# Patient Record
Sex: FEMALE | Race: WHITE | Employment: OTHER | URBAN - METROPOLITAN AREA
[De-identification: names, ages, dates, MRNs, and addresses within clinical notes are randomized per-mention and may not be internally consistent; named-entity substitution may affect disease eponyms.]

---

## 2017-01-03 ENCOUNTER — OFFICE VISIT (OUTPATIENT)
Dept: SURGERY | Age: 62
End: 2017-01-03

## 2017-01-03 DIAGNOSIS — E53.8 VITAMIN B12 DEFICIENCY: Primary | ICD-10-CM

## 2017-01-03 PROCEDURE — 96372 THER/PROPH/DIAG INJ SC/IM: CPT | Performed by: INTERNAL MEDICINE

## 2017-01-03 RX ORDER — CYANOCOBALAMIN 1000 UG/ML
1000 INJECTION INTRAMUSCULAR; SUBCUTANEOUS ONCE
Status: COMPLETED | OUTPATIENT
Start: 2017-01-03 | End: 2017-01-03

## 2017-01-03 RX ADMIN — CYANOCOBALAMIN 1000 MCG: 1000 INJECTION INTRAMUSCULAR; SUBCUTANEOUS at 16:02

## 2017-01-06 RX ORDER — LANCETS 30 GAUGE
EACH MISCELLANEOUS
Qty: 100 EACH | Refills: 3 | Status: SHIPPED | OUTPATIENT
Start: 2017-01-06 | End: 2018-12-06 | Stop reason: SDUPTHER

## 2017-01-06 RX ORDER — GLUCOSAMINE HCL/CHONDROITIN SU 500-400 MG
CAPSULE ORAL
Qty: 50 STRIP | Refills: 6 | Status: SHIPPED | OUTPATIENT
Start: 2017-01-06 | End: 2018-01-04 | Stop reason: SDUPTHER

## 2017-02-01 ENCOUNTER — OFFICE VISIT (OUTPATIENT)
Dept: SURGERY | Age: 62
End: 2017-02-01

## 2017-02-01 VITALS
WEIGHT: 205 LBS | DIASTOLIC BLOOD PRESSURE: 70 MMHG | BODY MASS INDEX: 36.32 KG/M2 | HEIGHT: 63 IN | SYSTOLIC BLOOD PRESSURE: 130 MMHG | HEART RATE: 80 BPM

## 2017-02-01 DIAGNOSIS — E78.00 HYPERCHOLESTEREMIA: ICD-10-CM

## 2017-02-01 DIAGNOSIS — E53.8 VITAMIN B12 DEFICIENCY: ICD-10-CM

## 2017-02-01 DIAGNOSIS — E03.9 ACQUIRED HYPOTHYROIDISM: ICD-10-CM

## 2017-02-01 LAB — GLUCOSE BLD-MCNC: 113 MG/DL

## 2017-02-01 PROCEDURE — 3017F COLORECTAL CA SCREEN DOC REV: CPT | Performed by: INTERNAL MEDICINE

## 2017-02-01 PROCEDURE — G8427 DOCREV CUR MEDS BY ELIG CLIN: HCPCS | Performed by: INTERNAL MEDICINE

## 2017-02-01 PROCEDURE — 3044F HG A1C LEVEL LT 7.0%: CPT | Performed by: INTERNAL MEDICINE

## 2017-02-01 PROCEDURE — G8419 CALC BMI OUT NRM PARAM NOF/U: HCPCS | Performed by: INTERNAL MEDICINE

## 2017-02-01 PROCEDURE — 99213 OFFICE O/P EST LOW 20 MIN: CPT | Performed by: INTERNAL MEDICINE

## 2017-02-01 PROCEDURE — 96372 THER/PROPH/DIAG INJ SC/IM: CPT | Performed by: INTERNAL MEDICINE

## 2017-02-01 PROCEDURE — 82962 GLUCOSE BLOOD TEST: CPT | Performed by: INTERNAL MEDICINE

## 2017-02-01 PROCEDURE — 4004F PT TOBACCO SCREEN RCVD TLK: CPT | Performed by: INTERNAL MEDICINE

## 2017-02-01 PROCEDURE — 3014F SCREEN MAMMO DOC REV: CPT | Performed by: INTERNAL MEDICINE

## 2017-02-01 PROCEDURE — G8484 FLU IMMUNIZE NO ADMIN: HCPCS | Performed by: INTERNAL MEDICINE

## 2017-02-01 RX ORDER — CYANOCOBALAMIN 1000 UG/ML
1000 INJECTION INTRAMUSCULAR; SUBCUTANEOUS ONCE
Status: COMPLETED | OUTPATIENT
Start: 2017-02-01 | End: 2017-02-01

## 2017-02-01 RX ORDER — AMLODIPINE BESYLATE 10 MG/1
TABLET ORAL
Qty: 90 TABLET | Refills: 3 | Status: SHIPPED | OUTPATIENT
Start: 2017-02-01 | End: 2017-11-03 | Stop reason: SDUPTHER

## 2017-02-01 RX ORDER — LEVOTHYROXINE SODIUM 0.1 MG/1
100 TABLET ORAL DAILY
Qty: 90 TABLET | Refills: 3 | Status: SHIPPED | OUTPATIENT
Start: 2017-02-01 | End: 2017-11-03 | Stop reason: SDUPTHER

## 2017-02-01 RX ORDER — CYANOCOBALAMIN 1000 UG/ML
1000 INJECTION INTRAMUSCULAR; SUBCUTANEOUS ONCE
Qty: 10 ML | Refills: 5 | Status: SHIPPED | OUTPATIENT
Start: 2017-02-01 | End: 2017-11-03 | Stop reason: SDUPTHER

## 2017-02-01 RX ORDER — ATORVASTATIN CALCIUM 10 MG/1
10 TABLET, FILM COATED ORAL DAILY
Qty: 90 TABLET | Refills: 3 | Status: SHIPPED | OUTPATIENT
Start: 2017-02-01 | End: 2017-11-03 | Stop reason: SDUPTHER

## 2017-02-01 RX ORDER — SYRINGE W-NEEDLE,DISPOSAB,3 ML 25GX5/8"
SYRINGE, EMPTY DISPOSABLE MISCELLANEOUS
Qty: 20 EACH | Refills: 0 | Status: SHIPPED | OUTPATIENT
Start: 2017-02-01 | End: 2017-03-30 | Stop reason: SDUPTHER

## 2017-02-01 RX ADMIN — CYANOCOBALAMIN 1000 MCG: 1000 INJECTION INTRAMUSCULAR; SUBCUTANEOUS at 12:07

## 2017-02-05 ASSESSMENT — ENCOUNTER SYMPTOMS: EYES NEGATIVE: 1

## 2017-02-07 PROBLEM — M50.223 HERNIATED NUCLEUS PULPOSUS, C6-7 LEFT: Status: ACTIVE | Noted: 2017-02-07

## 2017-02-07 PROBLEM — M75.02 ADHESIVE CAPSULITIS OF BOTH SHOULDERS: Status: ACTIVE | Noted: 2017-02-07

## 2017-02-07 PROBLEM — F17.200 SMOKER: Status: ACTIVE | Noted: 2017-02-07

## 2017-02-07 PROBLEM — M50.222 HERNIATED NUCLEUS PULPOSUS, C5-6: Status: ACTIVE | Noted: 2017-02-07

## 2017-02-07 PROBLEM — M75.01 ADHESIVE CAPSULITIS OF BOTH SHOULDERS: Status: ACTIVE | Noted: 2017-02-07

## 2017-03-30 RX ORDER — SYRINGE W-NEEDLE,DISPOSAB,3 ML 25GX5/8"
SYRINGE, EMPTY DISPOSABLE MISCELLANEOUS
Qty: 3 EACH | Refills: 3 | Status: SHIPPED | OUTPATIENT
Start: 2017-03-30 | End: 2017-03-30 | Stop reason: SDUPTHER

## 2017-03-30 RX ORDER — SYRINGE W-NEEDLE,DISPOSAB,3 ML 25GX5/8"
SYRINGE, EMPTY DISPOSABLE MISCELLANEOUS
Qty: 10 EACH | Refills: 3 | Status: SHIPPED | OUTPATIENT
Start: 2017-03-30

## 2017-04-25 LAB
CHOLESTEROL, TOTAL: 159 MG/DL
CHOLESTEROL/HDL RATIO: ABNORMAL
HDLC SERPL-MCNC: 32 MG/DL (ref 35–70)
LDL CHOLESTEROL CALCULATED: 84 MG/DL (ref 0–160)
TRIGL SERPL-MCNC: 214 MG/DL
VLDLC SERPL CALC-MCNC: 43 MG/DL

## 2017-05-03 ENCOUNTER — OFFICE VISIT (OUTPATIENT)
Dept: SURGERY | Age: 62
End: 2017-05-03

## 2017-05-03 VITALS
HEIGHT: 62 IN | SYSTOLIC BLOOD PRESSURE: 132 MMHG | HEART RATE: 80 BPM | WEIGHT: 205 LBS | BODY MASS INDEX: 37.73 KG/M2 | DIASTOLIC BLOOD PRESSURE: 74 MMHG

## 2017-05-03 DIAGNOSIS — J01.90 ACUTE NON-RECURRENT SINUSITIS, UNSPECIFIED LOCATION: ICD-10-CM

## 2017-05-03 DIAGNOSIS — E03.9 HYPOTHYROIDISM, UNSPECIFIED TYPE: ICD-10-CM

## 2017-05-03 LAB
GLUCOSE BLD-MCNC: 113 MG/DL
HBA1C MFR BLD: 6 %

## 2017-05-03 PROCEDURE — 83036 HEMOGLOBIN GLYCOSYLATED A1C: CPT | Performed by: INTERNAL MEDICINE

## 2017-05-03 PROCEDURE — 3014F SCREEN MAMMO DOC REV: CPT | Performed by: INTERNAL MEDICINE

## 2017-05-03 PROCEDURE — G8417 CALC BMI ABV UP PARAM F/U: HCPCS | Performed by: INTERNAL MEDICINE

## 2017-05-03 PROCEDURE — 99213 OFFICE O/P EST LOW 20 MIN: CPT | Performed by: INTERNAL MEDICINE

## 2017-05-03 PROCEDURE — 3044F HG A1C LEVEL LT 7.0%: CPT | Performed by: INTERNAL MEDICINE

## 2017-05-03 PROCEDURE — 4004F PT TOBACCO SCREEN RCVD TLK: CPT | Performed by: INTERNAL MEDICINE

## 2017-05-03 PROCEDURE — G8427 DOCREV CUR MEDS BY ELIG CLIN: HCPCS | Performed by: INTERNAL MEDICINE

## 2017-05-03 PROCEDURE — 3017F COLORECTAL CA SCREEN DOC REV: CPT | Performed by: INTERNAL MEDICINE

## 2017-05-03 PROCEDURE — 82962 GLUCOSE BLOOD TEST: CPT | Performed by: INTERNAL MEDICINE

## 2017-05-03 RX ORDER — AZITHROMYCIN 250 MG/1
TABLET, FILM COATED ORAL
Qty: 1 PACKET | Refills: 0 | Status: SHIPPED | OUTPATIENT
Start: 2017-05-03 | End: 2017-05-13

## 2017-05-03 ASSESSMENT — ENCOUNTER SYMPTOMS: EYES NEGATIVE: 1

## 2017-05-08 ASSESSMENT — ENCOUNTER SYMPTOMS: SINUS PRESSURE: 1

## 2017-05-26 RX ORDER — ATORVASTATIN CALCIUM 10 MG/1
10 TABLET, FILM COATED ORAL DAILY
Qty: 90 TABLET | Refills: 3 | Status: CANCELLED | OUTPATIENT
Start: 2017-05-26

## 2017-05-26 RX ORDER — ATORVASTATIN CALCIUM 10 MG/1
TABLET, FILM COATED ORAL
Qty: 30 TABLET | Refills: 3 | Status: CANCELLED | OUTPATIENT
Start: 2017-05-26

## 2017-05-26 RX ORDER — ATORVASTATIN CALCIUM 10 MG/1
TABLET, FILM COATED ORAL
Qty: 90 TABLET | Refills: 1 | Status: SHIPPED | OUTPATIENT
Start: 2017-05-26 | End: 2017-10-30 | Stop reason: SDUPTHER

## 2017-06-07 ENCOUNTER — HOSPITAL ENCOUNTER (OUTPATIENT)
Dept: WOMENS IMAGING | Age: 62
Discharge: HOME | End: 2017-06-07
Payer: COMMERCIAL

## 2017-06-07 DIAGNOSIS — Z12.31 ENCOUNTER FOR SCREENING MAMMOGRAM FOR BREAST CANCER: ICD-10-CM

## 2017-06-07 PROCEDURE — G0202 SCR MAMMO BI INCL CAD: HCPCS

## 2017-11-01 RX ORDER — ATORVASTATIN CALCIUM 10 MG/1
TABLET, FILM COATED ORAL
Qty: 90 TABLET | Refills: 1 | Status: SHIPPED | OUTPATIENT
Start: 2017-11-01 | End: 2017-11-03 | Stop reason: SDUPTHER

## 2017-11-03 ENCOUNTER — OFFICE VISIT (OUTPATIENT)
Dept: SURGERY | Age: 62
End: 2017-11-03

## 2017-11-03 VITALS
DIASTOLIC BLOOD PRESSURE: 87 MMHG | WEIGHT: 206 LBS | HEART RATE: 81 BPM | BODY MASS INDEX: 37.91 KG/M2 | HEIGHT: 62 IN | SYSTOLIC BLOOD PRESSURE: 139 MMHG

## 2017-11-03 DIAGNOSIS — E78.00 HYPERCHOLESTEROLEMIA: ICD-10-CM

## 2017-11-03 DIAGNOSIS — E03.9 ACQUIRED HYPOTHYROIDISM: ICD-10-CM

## 2017-11-03 LAB — GLUCOSE BLD-MCNC: 119 MG/DL

## 2017-11-03 PROCEDURE — G8417 CALC BMI ABV UP PARAM F/U: HCPCS | Performed by: INTERNAL MEDICINE

## 2017-11-03 PROCEDURE — 4004F PT TOBACCO SCREEN RCVD TLK: CPT | Performed by: INTERNAL MEDICINE

## 2017-11-03 PROCEDURE — G8484 FLU IMMUNIZE NO ADMIN: HCPCS | Performed by: INTERNAL MEDICINE

## 2017-11-03 PROCEDURE — 3044F HG A1C LEVEL LT 7.0%: CPT | Performed by: INTERNAL MEDICINE

## 2017-11-03 PROCEDURE — 3017F COLORECTAL CA SCREEN DOC REV: CPT | Performed by: INTERNAL MEDICINE

## 2017-11-03 PROCEDURE — 3014F SCREEN MAMMO DOC REV: CPT | Performed by: INTERNAL MEDICINE

## 2017-11-03 PROCEDURE — 99213 OFFICE O/P EST LOW 20 MIN: CPT | Performed by: INTERNAL MEDICINE

## 2017-11-03 PROCEDURE — 82962 GLUCOSE BLOOD TEST: CPT | Performed by: INTERNAL MEDICINE

## 2017-11-03 PROCEDURE — G8427 DOCREV CUR MEDS BY ELIG CLIN: HCPCS | Performed by: INTERNAL MEDICINE

## 2017-11-03 RX ORDER — AMLODIPINE BESYLATE 10 MG/1
TABLET ORAL
Qty: 90 TABLET | Refills: 3 | Status: SHIPPED | OUTPATIENT
Start: 2017-11-03 | End: 2018-11-20 | Stop reason: SDUPTHER

## 2017-11-03 RX ORDER — ATORVASTATIN CALCIUM 10 MG/1
TABLET, FILM COATED ORAL
Qty: 90 TABLET | Refills: 1 | Status: SHIPPED | OUTPATIENT
Start: 2017-11-03 | End: 2018-07-27 | Stop reason: SDUPTHER

## 2017-11-03 RX ORDER — CYANOCOBALAMIN 1000 UG/ML
1000 INJECTION INTRAMUSCULAR; SUBCUTANEOUS ONCE
Qty: 10 ML | Refills: 5 | Status: SHIPPED | OUTPATIENT
Start: 2017-11-03 | End: 2021-02-12

## 2017-11-03 RX ORDER — LEVOTHYROXINE SODIUM 0.1 MG/1
100 TABLET ORAL DAILY
Qty: 90 TABLET | Refills: 3 | Status: SHIPPED | OUTPATIENT
Start: 2017-11-03 | End: 2018-11-20 | Stop reason: SDUPTHER

## 2017-11-03 NOTE — PROGRESS NOTES
Subjective:      Patient ID: Lainey Bahena is a 60 y.o. female. Diabetes   She presents for her follow-up diabetic visit. She has type 2 diabetes mellitus. Her disease course has been stable. Hypoglycemia symptoms include nervousness/anxiousness. Symptoms are stable. Risk factors for coronary artery disease include diabetes mellitus, obesity, sedentary lifestyle and post-menopausal. Current diabetic treatment includes oral agent (monotherapy) (metformin). She is compliant with treatment most of the time. She is following a generally healthy diet. She participates in exercise intermittently. Her overall blood glucose range is 110-130 mg/dl. (Lab Results       Component                Value               Date                       LABA1C                   6.0                  10/27/17            ) Eye exam current: 11/29/16. Other   This is a chronic (hypothyroidism) problem. The current episode started more than 1 year ago. The problem occurs intermittently. The problem has been waxing and waning. Treatments tried: synthyroid. The treatment provided moderate relief. Hyperlipidemia   This is a chronic problem. Exacerbating diseases include diabetes, hypothyroidism and obesity. Current antihyperlipidemic treatment includes statins. Risk factors for coronary artery disease include diabetes mellitus, dyslipidemia, obesity, post-menopausal and a sedentary lifestyle.        Pt requesting hepatitis C SCREENING TEST  test       Patient Active Problem List   Diagnosis    Acquired hypothyroidism    Obesity    Vitamin B12 deficiency    Uncontrolled diabetes mellitus type 2 without complications ([facility] [address]    Uncontrolled type 2 diabetes mellitus without complication, without long-term current use of insulin (HCC)    Adhesive capsulitis of both shoulders    Herniated nucleus pulposus, C5-6    Herniated nucleus pulposus, C6-7 left    Smoker       No Known Allergies               Current Outpatient Prescriptions:    heart sounds. Pulmonary/Chest: Effort normal and breath sounds normal.   Abdominal:   Obese    Musculoskeletal: Normal range of motion. She exhibits edema. Neurological: She is alert and oriented to person, place, and time. Skin: Skin is warm and dry. Psychiatric: She has a normal mood and affect. Labs reviewed from 10/27/17  Chem was normal   hbaic was 6.2  Free t4 was 1.36  tsh was 2.520   Assessment:      1. Uncontrolled type 2 diabetes mellitus without complication, without long-term current use of insulin (HCC)  POCT Glucose    Lipid Panel    Hepatic Function Panel    Basic Metabolic Panel    Hemoglobin A1C    Hepatitis B Core Antibody, Total    CANCELED: POCT glycosylated hemoglobin (Hb A1C)   2. Acquired hypothyroidism  T4, Free    TSH without Reflex   3.  Hypercholesterolemia             Plan:      Orders Placed This Encounter   Procedures    Lipid Panel     Standing Status:   Future     Standing Expiration Date:   11/3/2018     Order Specific Question:   Is Patient Fasting?/# of Hours     Answer:   y    Hepatic Function Panel     Standing Status:   Future     Standing Expiration Date:   11/3/2018    Basic Metabolic Panel     Standing Status:   Future     Standing Expiration Date:   11/3/2018    Hemoglobin A1C     Standing Status:   Future     Standing Expiration Date:   11/3/2018    T4, Free     Standing Status:   Future     Standing Expiration Date:   11/3/2018    TSH without Reflex     Standing Status:   Future     Standing Expiration Date:   11/3/2018    Hepatitis B Core Antibody, Total     Standing Status:   Future     Standing Expiration Date:   11/3/2018    POCT Glucose     Orders Placed This Encounter   Medications    metFORMIN (GLUCOPHAGE) 500 MG tablet     Sig: TAKE 1 TABLET TWICE A DAY WITH MEALS     Dispense:  180 tablet     Refill:  3    cyanocobalamin 1000 MCG/ML injection     Sig: Inject 1 mL into the muscle once for 1 dose     Dispense:  10 mL     Refill:  5    levothyroxine (SYNTHROID) 100 MCG tablet     Sig: Take 1 tablet by mouth Daily     Dispense:  90 tablet     Refill:  03    atorvastatin (LIPITOR) 10 MG tablet     Sig: TAKE 1 TABLET DAILY     Dispense:  90 tablet     Refill:  1    amLODIPine (NORVASC) 10 MG tablet     Sig: TAKE 1 TABLET BY MOUTH DAILY     Dispense:  90 tablet     Refill:  3     F/U IN 6 MONTHS

## 2018-01-04 RX ORDER — LANCETS 28 GAUGE
1 EACH MISCELLANEOUS SEE ADMIN INSTRUCTIONS
Qty: 50 EACH | Refills: 6 | Status: SHIPPED | OUTPATIENT
Start: 2018-01-04 | End: 2018-05-03 | Stop reason: ALTCHOICE

## 2018-01-04 RX ORDER — GLUCOSAMINE HCL/CHONDROITIN SU 500-400 MG
CAPSULE ORAL
Qty: 50 STRIP | Refills: 6 | Status: SHIPPED | OUTPATIENT
Start: 2018-01-04 | End: 2018-12-06 | Stop reason: SDUPTHER

## 2018-01-04 RX ORDER — GLUCOSAMINE HCL/CHONDROITIN SU 500-400 MG
CAPSULE ORAL
Qty: 50 STRIP | Refills: 6 | Status: SHIPPED | OUTPATIENT
Start: 2018-01-04 | End: 2018-01-04 | Stop reason: SDUPTHER

## 2018-01-04 RX ORDER — LANCETS 28 GAUGE
1 EACH MISCELLANEOUS SEE ADMIN INSTRUCTIONS
Qty: 50 EACH | Refills: 6 | Status: SHIPPED | OUTPATIENT
Start: 2018-01-04 | End: 2018-01-04 | Stop reason: SDUPTHER

## 2018-04-26 LAB
A/G RATIO: NORMAL
ALBUMIN SERPL-MCNC: 4.6 G/DL
ALP BLD-CCNC: 52 U/L
ALT SERPL-CCNC: 15 U/L
AST SERPL-CCNC: 12 U/L
AVERAGE GLUCOSE: NORMAL
BILIRUB SERPL-MCNC: 0.4 MG/DL (ref 0.1–1.4)
BILIRUBIN DIRECT: 0.12 MG/DL
BILIRUBIN, INDIRECT: NORMAL
BUN BLDV-MCNC: 15 MG/DL
CALCIUM SERPL-MCNC: 9.5 MG/DL
CHLORIDE BLD-SCNC: 103 MMOL/L
CHOLESTEROL, TOTAL: 141 MG/DL
CHOLESTEROL/HDL RATIO: ABNORMAL
CO2: NORMAL MMOL/L
CREAT SERPL-MCNC: 0.84 MG/DL
GFR CALCULATED: NORMAL
GLOBULIN: NORMAL
GLUCOSE BLD-MCNC: 119 MG/DL
HBA1C MFR BLD: 6.2 %
HDLC SERPL-MCNC: 34 MG/DL (ref 35–70)
LDL CHOLESTEROL CALCULATED: 68 MG/DL (ref 0–160)
POTASSIUM SERPL-SCNC: 3.7 MMOL/L
PROTEIN TOTAL: 7.1 G/DL
SODIUM BLD-SCNC: 145 MMOL/L
TRIGL SERPL-MCNC: 197 MG/DL
VLDLC SERPL CALC-MCNC: 39 MG/DL

## 2018-05-03 ENCOUNTER — OFFICE VISIT (OUTPATIENT)
Dept: ENDOCRINOLOGY | Age: 63
End: 2018-05-03
Payer: COMMERCIAL

## 2018-05-03 VITALS
HEART RATE: 74 BPM | DIASTOLIC BLOOD PRESSURE: 74 MMHG | SYSTOLIC BLOOD PRESSURE: 128 MMHG | BODY MASS INDEX: 37.91 KG/M2 | HEIGHT: 62 IN | WEIGHT: 206 LBS

## 2018-05-03 DIAGNOSIS — E03.9 ACQUIRED HYPOTHYROIDISM: ICD-10-CM

## 2018-05-03 DIAGNOSIS — E78.00 HYPERCHOLESTEREMIA: ICD-10-CM

## 2018-05-03 DIAGNOSIS — E66.9 OBESITY (BMI 30-39.9): ICD-10-CM

## 2018-05-03 LAB — GLUCOSE BLD-MCNC: 155 MG/DL

## 2018-05-03 PROCEDURE — 99214 OFFICE O/P EST MOD 30 MIN: CPT | Performed by: INTERNAL MEDICINE

## 2018-05-03 PROCEDURE — 3046F HEMOGLOBIN A1C LEVEL >9.0%: CPT | Performed by: INTERNAL MEDICINE

## 2018-05-03 PROCEDURE — 2022F DILAT RTA XM EVC RTNOPTHY: CPT | Performed by: INTERNAL MEDICINE

## 2018-05-03 PROCEDURE — 3017F COLORECTAL CA SCREEN DOC REV: CPT | Performed by: INTERNAL MEDICINE

## 2018-05-03 PROCEDURE — G8417 CALC BMI ABV UP PARAM F/U: HCPCS | Performed by: INTERNAL MEDICINE

## 2018-05-03 PROCEDURE — 4004F PT TOBACCO SCREEN RCVD TLK: CPT | Performed by: INTERNAL MEDICINE

## 2018-05-03 PROCEDURE — 82962 GLUCOSE BLOOD TEST: CPT | Performed by: INTERNAL MEDICINE

## 2018-05-03 PROCEDURE — G8427 DOCREV CUR MEDS BY ELIG CLIN: HCPCS | Performed by: INTERNAL MEDICINE

## 2018-06-13 ENCOUNTER — HOSPITAL ENCOUNTER (OUTPATIENT)
Dept: WOMENS IMAGING | Age: 63
Discharge: HOME | End: 2018-06-15
Payer: COMMERCIAL

## 2018-06-13 DIAGNOSIS — Z12.31 ENCOUNTER FOR SCREENING MAMMOGRAM FOR BREAST CANCER: ICD-10-CM

## 2018-06-13 PROCEDURE — 77067 SCR MAMMO BI INCL CAD: CPT

## 2018-07-27 RX ORDER — ATORVASTATIN CALCIUM 10 MG/1
TABLET, FILM COATED ORAL
Qty: 90 TABLET | Refills: 1 | Status: SHIPPED | OUTPATIENT
Start: 2018-07-27 | End: 2018-11-20 | Stop reason: SDUPTHER

## 2018-11-15 LAB
ANION GAP SERPL CALCULATED.3IONS-SCNC: 11 MEQ/L (ref 7–13)
BUN BLDV-MCNC: 19 MG/DL (ref 8–23)
CALCIUM SERPL-MCNC: 9.1 MG/DL (ref 8.6–10.2)
CHLORIDE BLD-SCNC: 100 MEQ/L (ref 98–107)
CHOLESTEROL, TOTAL: 129 MG/DL (ref 0–199)
CO2: 25 MEQ/L (ref 22–29)
CREAT SERPL-MCNC: 0.83 MG/DL (ref 0.5–0.9)
CREATININE URINE: 240 MG/DL
GFR AFRICAN AMERICAN: >60
GFR NON-AFRICAN AMERICAN: >60
GLUCOSE BLD-MCNC: 116 MG/DL (ref 74–109)
HBA1C MFR BLD: 6 % (ref 4.8–5.9)
HDLC SERPL-MCNC: 33 MG/DL (ref 40–59)
LDL CHOLESTEROL CALCULATED: 58 MG/DL (ref 0–129)
MICROALBUMIN UR-MCNC: 3.4 MG/DL
MICROALBUMIN/CREAT UR-RTO: 14.2 MG/G (ref 0–30)
POTASSIUM SERPL-SCNC: 4 MEQ/L (ref 3.5–5.1)
SODIUM BLD-SCNC: 136 MEQ/L (ref 132–144)
TRIGL SERPL-MCNC: 188 MG/DL (ref 0–200)

## 2018-11-20 ENCOUNTER — OFFICE VISIT (OUTPATIENT)
Dept: ENDOCRINOLOGY | Age: 63
End: 2018-11-20
Payer: COMMERCIAL

## 2018-11-20 VITALS
DIASTOLIC BLOOD PRESSURE: 75 MMHG | HEART RATE: 78 BPM | OXYGEN SATURATION: 94 % | SYSTOLIC BLOOD PRESSURE: 130 MMHG | WEIGHT: 204 LBS | HEIGHT: 63 IN | BODY MASS INDEX: 36.14 KG/M2

## 2018-11-20 DIAGNOSIS — E03.9 ACQUIRED HYPOTHYROIDISM: ICD-10-CM

## 2018-11-20 LAB — GLUCOSE BLD-MCNC: 185 MG/DL

## 2018-11-20 PROCEDURE — 4004F PT TOBACCO SCREEN RCVD TLK: CPT | Performed by: INTERNAL MEDICINE

## 2018-11-20 PROCEDURE — G8427 DOCREV CUR MEDS BY ELIG CLIN: HCPCS | Performed by: INTERNAL MEDICINE

## 2018-11-20 PROCEDURE — 2022F DILAT RTA XM EVC RTNOPTHY: CPT | Performed by: INTERNAL MEDICINE

## 2018-11-20 PROCEDURE — 3044F HG A1C LEVEL LT 7.0%: CPT | Performed by: INTERNAL MEDICINE

## 2018-11-20 PROCEDURE — G8417 CALC BMI ABV UP PARAM F/U: HCPCS | Performed by: INTERNAL MEDICINE

## 2018-11-20 PROCEDURE — 82962 GLUCOSE BLOOD TEST: CPT | Performed by: INTERNAL MEDICINE

## 2018-11-20 PROCEDURE — 3017F COLORECTAL CA SCREEN DOC REV: CPT | Performed by: INTERNAL MEDICINE

## 2018-11-20 PROCEDURE — G8484 FLU IMMUNIZE NO ADMIN: HCPCS | Performed by: INTERNAL MEDICINE

## 2018-11-20 PROCEDURE — 99213 OFFICE O/P EST LOW 20 MIN: CPT | Performed by: INTERNAL MEDICINE

## 2018-11-20 RX ORDER — ATORVASTATIN CALCIUM 10 MG/1
TABLET, FILM COATED ORAL
Qty: 90 TABLET | Refills: 3 | Status: SHIPPED | OUTPATIENT
Start: 2018-11-20 | End: 2019-11-20 | Stop reason: SDUPTHER

## 2018-11-20 RX ORDER — LEVOTHYROXINE SODIUM 0.1 MG/1
100 TABLET ORAL DAILY
Qty: 90 TABLET | Refills: 3 | Status: SHIPPED | OUTPATIENT
Start: 2018-11-20 | End: 2019-05-20 | Stop reason: SDUPTHER

## 2018-11-20 RX ORDER — MELOXICAM 15 MG/1
15 TABLET ORAL DAILY
COMMUNITY
End: 2021-02-12

## 2018-11-20 RX ORDER — AMLODIPINE BESYLATE 10 MG/1
TABLET ORAL
Qty: 90 TABLET | Refills: 3 | Status: SHIPPED | OUTPATIENT
Start: 2018-11-20 | End: 2019-11-20 | Stop reason: SDUPTHER

## 2018-12-06 RX ORDER — GLUCOSAMINE HCL/CHONDROITIN SU 500-400 MG
CAPSULE ORAL
Qty: 50 STRIP | Refills: 6 | Status: SHIPPED | OUTPATIENT
Start: 2018-12-06

## 2018-12-06 RX ORDER — LANCETS 30 GAUGE
EACH MISCELLANEOUS
Qty: 100 EACH | Refills: 3 | Status: SHIPPED | OUTPATIENT
Start: 2018-12-06

## 2019-05-15 DIAGNOSIS — E03.9 ACQUIRED HYPOTHYROIDISM: ICD-10-CM

## 2019-05-15 LAB
ALBUMIN SERPL-MCNC: 4.2 G/DL (ref 3.5–4.6)
ALP BLD-CCNC: 54 U/L (ref 40–130)
ALT SERPL-CCNC: 13 U/L (ref 0–33)
ANION GAP SERPL CALCULATED.3IONS-SCNC: 14 MEQ/L (ref 9–15)
AST SERPL-CCNC: 10 U/L (ref 0–35)
BILIRUB SERPL-MCNC: 0.4 MG/DL (ref 0.2–0.7)
BILIRUBIN DIRECT: <0.2 MG/DL (ref 0–0.4)
BILIRUBIN, INDIRECT: NORMAL MG/DL (ref 0–0.6)
BUN BLDV-MCNC: 14 MG/DL (ref 8–23)
CALCIUM SERPL-MCNC: 8.9 MG/DL (ref 8.5–9.9)
CHLORIDE BLD-SCNC: 101 MEQ/L (ref 95–107)
CHOLESTEROL, TOTAL: 136 MG/DL (ref 0–199)
CO2: 25 MEQ/L (ref 20–31)
CREAT SERPL-MCNC: 0.68 MG/DL (ref 0.5–0.9)
GFR AFRICAN AMERICAN: >60
GFR NON-AFRICAN AMERICAN: >60
GLUCOSE BLD-MCNC: 118 MG/DL (ref 70–99)
HBA1C MFR BLD: 6.3 % (ref 4.8–5.9)
HDLC SERPL-MCNC: 32 MG/DL (ref 40–59)
LDL CHOLESTEROL CALCULATED: 53 MG/DL (ref 0–129)
POTASSIUM SERPL-SCNC: 3.9 MEQ/L (ref 3.4–4.9)
SODIUM BLD-SCNC: 140 MEQ/L (ref 135–144)
T4 FREE: 1.53 NG/DL (ref 0.84–1.68)
TOTAL PROTEIN: 7.1 G/DL (ref 6.3–8)
TRIGL SERPL-MCNC: 254 MG/DL (ref 0–150)
TSH SERPL DL<=0.05 MIU/L-ACNC: 1.65 UIU/ML (ref 0.44–3.86)

## 2019-05-20 ENCOUNTER — OFFICE VISIT (OUTPATIENT)
Dept: ENDOCRINOLOGY | Age: 64
End: 2019-05-20
Payer: MEDICARE

## 2019-05-20 VITALS
SYSTOLIC BLOOD PRESSURE: 142 MMHG | HEIGHT: 63 IN | WEIGHT: 208 LBS | DIASTOLIC BLOOD PRESSURE: 84 MMHG | HEART RATE: 79 BPM | BODY MASS INDEX: 36.86 KG/M2

## 2019-05-20 DIAGNOSIS — E03.9 ACQUIRED HYPOTHYROIDISM: ICD-10-CM

## 2019-05-20 LAB
CHP ED QC CHECK: NORMAL
GLUCOSE BLD-MCNC: 112 MG/DL

## 2019-05-20 PROCEDURE — 4040F PNEUMOC VAC/ADMIN/RCVD: CPT | Performed by: INTERNAL MEDICINE

## 2019-05-20 PROCEDURE — 82962 GLUCOSE BLOOD TEST: CPT | Performed by: INTERNAL MEDICINE

## 2019-05-20 PROCEDURE — 4004F PT TOBACCO SCREEN RCVD TLK: CPT | Performed by: INTERNAL MEDICINE

## 2019-05-20 PROCEDURE — G8427 DOCREV CUR MEDS BY ELIG CLIN: HCPCS | Performed by: INTERNAL MEDICINE

## 2019-05-20 PROCEDURE — 3017F COLORECTAL CA SCREEN DOC REV: CPT | Performed by: INTERNAL MEDICINE

## 2019-05-20 PROCEDURE — 1090F PRES/ABSN URINE INCON ASSESS: CPT | Performed by: INTERNAL MEDICINE

## 2019-05-20 PROCEDURE — 1123F ACP DISCUSS/DSCN MKR DOCD: CPT | Performed by: INTERNAL MEDICINE

## 2019-05-20 PROCEDURE — 3044F HG A1C LEVEL LT 7.0%: CPT | Performed by: INTERNAL MEDICINE

## 2019-05-20 PROCEDURE — G8417 CALC BMI ABV UP PARAM F/U: HCPCS | Performed by: INTERNAL MEDICINE

## 2019-05-20 PROCEDURE — 2022F DILAT RTA XM EVC RTNOPTHY: CPT | Performed by: INTERNAL MEDICINE

## 2019-05-20 PROCEDURE — G8400 PT W/DXA NO RESULTS DOC: HCPCS | Performed by: INTERNAL MEDICINE

## 2019-05-20 PROCEDURE — 99213 OFFICE O/P EST LOW 20 MIN: CPT | Performed by: INTERNAL MEDICINE

## 2019-05-20 RX ORDER — LEVOTHYROXINE SODIUM 0.1 MG/1
100 TABLET ORAL DAILY
Qty: 90 TABLET | Refills: 3 | Status: SHIPPED | OUTPATIENT
Start: 2019-05-20 | End: 2019-11-20 | Stop reason: SDUPTHER

## 2019-05-20 NOTE — PROGRESS NOTES
35 U/L 10   Bilirubin Latest Ref Range: 0.2 - 0.7 mg/dL 0.4   Bilirubin, Direct Latest Ref Range: 0.0 - 0.4 mg/dL <0.2   Bilirubin, Indirect Latest Ref Range: 0.0 - 0.6 mg/dL see below   Hemoglobin A1C Latest Ref Range: 4.8 - 5.9 % 6.3 (H)   TSH Latest Ref Range: 0.440 - 3.860 uIU/mL 1.650   T4 Free Latest Ref Range: 0.84 - 1.68 ng/dL 1.53       Patient Active Problem List   Diagnosis    Acquired hypothyroidism    Obesity    Vitamin B12 deficiency    Uncontrolled diabetes mellitus type 2 without complications (HCC)    Uncontrolled type 2 diabetes mellitus without complication, without long-term current use of insulin (HCC)    Adhesive capsulitis of both shoulders    Herniated nucleus pulposus, C5-6    Herniated nucleus pulposus, C6-7 left    Smoker             Current Outpatient Medications:     blood glucose monitor strips, Test BG once daily, DX: E11.9, NIDDM, Disp: 50 strip, Rfl: 6    Lancets MISC, Test BG once daily, DX: E11.65, NIDDM, Disp: 100 each, Rfl: 3    meloxicam (MOBIC) 15 MG tablet, Take 15 mg by mouth daily 1 tablet daily for one week then alternate every other week, Disp: , Rfl:     metFORMIN (GLUCOPHAGE) 500 MG tablet, TAKE 1 TABLET TWICE A DAY WITH MEALS, Disp: 180 tablet, Rfl: 3    levothyroxine (SYNTHROID) 100 MCG tablet, Take 1 tablet by mouth Daily, Disp: 90 tablet, Rfl: 03    atorvastatin (LIPITOR) 10 MG tablet, TAKE 1 TABLET DAILY, Disp: 90 tablet, Rfl: 3    amLODIPine (NORVASC) 10 MG tablet, TAKE 1 TABLET BY MOUTH DAILY, Disp: 90 tablet, Rfl: 3    Syringe/Needle, Disp, (SYRINGE 3CC/25GX1\") 25G X 1\" 3 ML MISC, Use for monthly B-12 injection, Disp: 10 each, Rfl: 3    naproxen (NAPROSYN) 500 MG tablet, Take 1 tablet by mouth 2 times daily (with meals), Disp: 60 tablet, Rfl: 3    Blood Glucose Monitoring Suppl MIGUEL ANGEL, Test BG once daily, DX: E11.65, NIDDM, Disp: 1 Device, Rfl: 0    Alcohol Swabs PADS, Bid, Disp: 100 each, Rfl: 06    sertraline (ZOLOFT) 100 MG tablet, Take 100 mg by mouth daily, Disp: , Rfl:     cyanocobalamin 1000 MCG/ML injection, Inject 1 mL into the muscle once for 1 dose, Disp: 10 mL, Rfl: 5      Review of Systems   Constitutional: Positive for unexpected weight change. All other systems reviewed and are negative. Vitals:    05/20/19 1337   BP: (!) 142/84   Pulse: 79   Weight: 208 lb (94.3 kg)   Height: 5' 3\" (1.6 m)       Objective:   Physical Exam   Constitutional: She appears well-developed and well-nourished. HENT:   Head: Normocephalic and atraumatic. Musculoskeletal: Normal range of motion. Neurological: She is alert. Psychiatric: She has a normal mood and affect. Assessment:       Diagnosis Orders   1. Uncontrolled type 2 diabetes mellitus without complication, without long-term current use of insulin (HCC)  Basic Metabolic Panel    Hemoglobin A1C    Microalbumin / Creatinine Urine Ratio    POCT Glucose   2.  Acquired hypothyroidism             Plan:      Orders Placed This Encounter   Procedures    Basic Metabolic Panel     Standing Status:   Future     Standing Expiration Date:   5/20/2020    Hemoglobin A1C     Standing Status:   Future     Standing Expiration Date:   5/20/2020    Microalbumin / Creatinine Urine Ratio     Standing Status:   Future     Standing Expiration Date:   5/20/2020    POCT Glucose     Continue metformin 500 mg bid plus synthroid 100 mcg daily   F/u in 6 months

## 2019-06-14 ENCOUNTER — HOSPITAL ENCOUNTER (OUTPATIENT)
Dept: WOMENS IMAGING | Age: 64
Discharge: HOME | End: 2019-06-16
Payer: MEDICARE

## 2019-06-14 DIAGNOSIS — Z12.31 ENCOUNTER FOR SCREENING MAMMOGRAM FOR BREAST CANCER: ICD-10-CM

## 2019-06-14 PROCEDURE — 77067 SCR MAMMO BI INCL CAD: CPT

## 2019-11-20 ENCOUNTER — OFFICE VISIT (OUTPATIENT)
Dept: ENDOCRINOLOGY | Age: 64
End: 2019-11-20
Payer: MEDICARE

## 2019-11-20 VITALS
HEIGHT: 63 IN | HEART RATE: 78 BPM | WEIGHT: 208 LBS | BODY MASS INDEX: 36.86 KG/M2 | SYSTOLIC BLOOD PRESSURE: 150 MMHG | DIASTOLIC BLOOD PRESSURE: 83 MMHG

## 2019-11-20 DIAGNOSIS — E03.9 ACQUIRED HYPOTHYROIDISM: ICD-10-CM

## 2019-11-20 LAB
CHP ED QC CHECK: NORMAL
GLUCOSE BLD-MCNC: 100 MG/DL
HBA1C MFR BLD: 6.1 %

## 2019-11-20 PROCEDURE — 3017F COLORECTAL CA SCREEN DOC REV: CPT | Performed by: INTERNAL MEDICINE

## 2019-11-20 PROCEDURE — 4040F PNEUMOC VAC/ADMIN/RCVD: CPT | Performed by: INTERNAL MEDICINE

## 2019-11-20 PROCEDURE — 2022F DILAT RTA XM EVC RTNOPTHY: CPT | Performed by: INTERNAL MEDICINE

## 2019-11-20 PROCEDURE — 3044F HG A1C LEVEL LT 7.0%: CPT | Performed by: INTERNAL MEDICINE

## 2019-11-20 PROCEDURE — 1090F PRES/ABSN URINE INCON ASSESS: CPT | Performed by: INTERNAL MEDICINE

## 2019-11-20 PROCEDURE — G8484 FLU IMMUNIZE NO ADMIN: HCPCS | Performed by: INTERNAL MEDICINE

## 2019-11-20 PROCEDURE — 83036 HEMOGLOBIN GLYCOSYLATED A1C: CPT | Performed by: INTERNAL MEDICINE

## 2019-11-20 PROCEDURE — 1123F ACP DISCUSS/DSCN MKR DOCD: CPT | Performed by: INTERNAL MEDICINE

## 2019-11-20 PROCEDURE — G8427 DOCREV CUR MEDS BY ELIG CLIN: HCPCS | Performed by: INTERNAL MEDICINE

## 2019-11-20 PROCEDURE — 4004F PT TOBACCO SCREEN RCVD TLK: CPT | Performed by: INTERNAL MEDICINE

## 2019-11-20 PROCEDURE — G8400 PT W/DXA NO RESULTS DOC: HCPCS | Performed by: INTERNAL MEDICINE

## 2019-11-20 PROCEDURE — 99213 OFFICE O/P EST LOW 20 MIN: CPT | Performed by: INTERNAL MEDICINE

## 2019-11-20 PROCEDURE — 82962 GLUCOSE BLOOD TEST: CPT | Performed by: INTERNAL MEDICINE

## 2019-11-20 PROCEDURE — G8417 CALC BMI ABV UP PARAM F/U: HCPCS | Performed by: INTERNAL MEDICINE

## 2019-11-20 RX ORDER — LEVOTHYROXINE SODIUM 0.1 MG/1
100 TABLET ORAL DAILY
Qty: 90 TABLET | Refills: 3 | Status: SHIPPED | OUTPATIENT
Start: 2019-11-20 | End: 2020-05-20 | Stop reason: SDUPTHER

## 2019-11-20 RX ORDER — ATORVASTATIN CALCIUM 10 MG/1
TABLET, FILM COATED ORAL
Qty: 90 TABLET | Refills: 3 | Status: SHIPPED | OUTPATIENT
Start: 2019-11-20 | End: 2020-05-20 | Stop reason: SDUPTHER

## 2019-11-20 RX ORDER — AMLODIPINE BESYLATE 10 MG/1
TABLET ORAL
Qty: 90 TABLET | Refills: 3 | Status: SHIPPED | OUTPATIENT
Start: 2019-11-20 | End: 2019-12-07 | Stop reason: SDUPTHER

## 2019-11-21 DIAGNOSIS — E03.9 ACQUIRED HYPOTHYROIDISM: ICD-10-CM

## 2019-11-21 LAB
ANION GAP SERPL CALCULATED.3IONS-SCNC: 12 MEQ/L (ref 9–15)
BUN BLDV-MCNC: 13 MG/DL (ref 8–23)
CALCIUM SERPL-MCNC: 9.2 MG/DL (ref 8.5–9.9)
CHLORIDE BLD-SCNC: 106 MEQ/L (ref 95–107)
CHOLESTEROL, TOTAL: 121 MG/DL (ref 0–199)
CO2: 25 MEQ/L (ref 20–31)
CREAT SERPL-MCNC: 0.67 MG/DL (ref 0.5–0.9)
GFR AFRICAN AMERICAN: >60
GFR NON-AFRICAN AMERICAN: >60
GLUCOSE BLD-MCNC: 103 MG/DL (ref 70–99)
HBA1C MFR BLD: 6.3 % (ref 4.8–5.9)
HDLC SERPL-MCNC: 34 MG/DL (ref 40–59)
LDL CHOLESTEROL CALCULATED: 45 MG/DL (ref 0–129)
POTASSIUM SERPL-SCNC: 3.7 MEQ/L (ref 3.4–4.9)
SODIUM BLD-SCNC: 143 MEQ/L (ref 135–144)
T4 FREE: 1.43 NG/DL (ref 0.84–1.68)
TRIGL SERPL-MCNC: 208 MG/DL (ref 0–150)
TSH SERPL DL<=0.05 MIU/L-ACNC: 2.16 UIU/ML (ref 0.44–3.86)

## 2019-11-21 RX ORDER — SERTRALINE HYDROCHLORIDE 100 MG/1
100 TABLET, FILM COATED ORAL DAILY
Qty: 90 TABLET | Refills: 0 | Status: SHIPPED | OUTPATIENT
Start: 2019-11-21 | End: 2020-03-02

## 2019-11-25 ENCOUNTER — TELEPHONE (OUTPATIENT)
Dept: ENDOCRINOLOGY | Age: 64
End: 2019-11-25

## 2019-12-09 RX ORDER — AMLODIPINE BESYLATE 10 MG/1
TABLET ORAL
Qty: 90 TABLET | Refills: 1 | Status: SHIPPED | OUTPATIENT
Start: 2019-12-09 | End: 2020-05-20 | Stop reason: SDUPTHER

## 2020-03-02 RX ORDER — SERTRALINE HYDROCHLORIDE 100 MG/1
TABLET, FILM COATED ORAL
Qty: 90 TABLET | Refills: 4 | Status: SHIPPED | OUTPATIENT
Start: 2020-03-02 | End: 2020-03-09 | Stop reason: SDUPTHER

## 2020-03-09 ENCOUNTER — OFFICE VISIT (OUTPATIENT)
Dept: NEUROLOGY | Age: 65
End: 2020-03-09
Payer: MEDICARE

## 2020-03-09 VITALS
DIASTOLIC BLOOD PRESSURE: 67 MMHG | SYSTOLIC BLOOD PRESSURE: 143 MMHG | WEIGHT: 207 LBS | BODY MASS INDEX: 36.67 KG/M2 | HEART RATE: 84 BPM

## 2020-03-09 PROBLEM — M54.12 CERVICAL RADICULOPATHY: Status: ACTIVE | Noted: 2020-03-09

## 2020-03-09 PROBLEM — M79.2 RADICULAR PAIN IN RIGHT ARM: Status: ACTIVE | Noted: 2020-03-09

## 2020-03-09 PROCEDURE — 99214 OFFICE O/P EST MOD 30 MIN: CPT | Performed by: PSYCHIATRY & NEUROLOGY

## 2020-03-09 RX ORDER — SERTRALINE HYDROCHLORIDE 100 MG/1
TABLET, FILM COATED ORAL
Qty: 90 TABLET | Refills: 3 | Status: SHIPPED | OUTPATIENT
Start: 2020-03-09 | End: 2020-09-16 | Stop reason: SDUPTHER

## 2020-03-09 ASSESSMENT — ENCOUNTER SYMPTOMS
BACK PAIN: 0
SHORTNESS OF BREATH: 0
CHOKING: 0
PHOTOPHOBIA: 0
NAUSEA: 0
TROUBLE SWALLOWING: 0
VOMITING: 0

## 2020-03-09 NOTE — PROGRESS NOTES
file     Non-medical: Not on file   Tobacco Use    Smoking status: Current Every Day Smoker     Types: Cigarettes    Smokeless tobacco: Never Used    Tobacco comment: trying to cut back   Substance and Sexual Activity    Alcohol use: Not on file    Drug use: Not on file    Sexual activity: Not on file   Lifestyle    Physical activity     Days per week: Not on file     Minutes per session: Not on file    Stress: Not on file   Relationships    Social connections     Talks on phone: Not on file     Gets together: Not on file     Attends Congregation service: Not on file     Active member of club or organization: Not on file     Attends meetings of clubs or organizations: Not on file     Relationship status: Not on file    Intimate partner violence     Fear of current or ex partner: Not on file     Emotionally abused: Not on file     Physically abused: Not on file     Forced sexual activity: Not on file   Other Topics Concern    Not on file   Social History Narrative    Not on file     Family History   Problem Relation Age of Onset    Heart Disease Mother     Cancer Father      No Known Allergies      Review of Systems   Constitutional: Negative for fever. HENT: Negative for ear pain, tinnitus and trouble swallowing. Eyes: Negative for photophobia and visual disturbance. Respiratory: Negative for choking and shortness of breath. Cardiovascular: Negative for chest pain and palpitations. Gastrointestinal: Negative for nausea and vomiting. Musculoskeletal: Negative for back pain, gait problem, joint swelling, myalgias, neck pain and neck stiffness. Neurological: Negative for dizziness, tremors, seizures, syncope, facial asymmetry, speech difficulty, weakness, light-headedness, numbness and headaches. Psychiatric/Behavioral: Negative for behavioral problems, confusion, hallucinations and sleep disturbance.        Objective:   BP (!) 143/67 (Site: Left Upper Arm, Position: Sitting, Cuff Size: FACILITY UTILIZES A REMINDER SYSTEM TO ENSURE ALL PATIENTS RECEIVE REMINDER NOTIFICATIONS AT THE APPROPRIATE TIME BASED ON THE RECOMMENDATIONS OF THIS EXAM. THIS INCLUDES REMINDERS FOR ROUTINE  SCREENING MAMMOGRAMS, DIAGNOSTIC MAMMOGRAMS IN WHICH THE PATIENT IS ASKED TO RETURN FOR ADDITIONAL VIEWS, OR OTHER BREAST IMAGING INTERVENTIONS WHEN APPROPRIATE. THE PATIENT WILL BE PLACED IN THE APPROPRIATE REMINDER SYSTEM INCLUDING A REMINDER AT THE APPROPRIATE TIME FOR ANY PENDING ADDITIONAL VIEWS. No results found for: WBC, RBC, HGB, HCT, MCV, MCH, MCHC, RDW, PLT, MPV  Lab Results   Component Value Date     11/21/2019    K 3.7 11/21/2019     11/21/2019    CO2 25 11/21/2019    BUN 13 11/21/2019    CREATININE 0.67 11/21/2019    GFRAA >60.0 11/21/2019    LABGLOM >60.0 11/21/2019    GLUCOSE 103 11/21/2019    PROT 7.1 05/15/2019    PROT 7.1 04/26/2018    LABALBU 4.2 05/15/2019    CALCIUM 9.2 11/21/2019    BILITOT 0.4 05/15/2019    ALKPHOS 54 05/15/2019    AST 10 05/15/2019    ALT 13 05/15/2019     No results found for: PROTIME, INR  Lab Results   Component Value Date    TSH 2.160 11/21/2019    [id number] 518 07/21/2016    FOLATE 15.8 07/21/2016     Lab Results   Component Value Date    TRIG 208 11/21/2019    HDL 34 11/21/2019    LDLCALC 45 11/21/2019     No results found for: [facility], LABBENZ, CANNAB, COCAINESCRN, LABMETH, OPIATESCREENURINE, PHENCYCLIDINESCREENURINE, PPXUR, ETOH  No results found for: LITHIUM, DILFRTOT, VALPROATE    Assessment:       Diagnosis Orders   1. Cervical radiculopathy     2. Adhesive capsulitis of both shoulders  Amb External Referral To Orthopedic Surgery    Amb External Referral To Pain Clinic   Cervical  radiculopathy with suggestion of cervicogenic radicular findings with mild compression of the spine she does not have myelopathic signs and she is not a surgical candidate that she has been seen by neurosurgery. She is on sertraline which is helping.   She is here as she has severe shoulder pain and I recommended that we obtain an opinion with Dr. Vladimir Nino for further evaluation patient may require intra-articular injections. Patient also will be referred to Dr. Madelyn Lopez to see if there is anything he can help with for her shoulders I though did discuss that this is a cycle of pain and rest of the shoulder and therefore she requires aggressive physical therapy to continue moving. We keep an eye on her cervical spine. Plan:      Orders Placed This Encounter   Procedures    Amb External Referral To Orthopedic Surgery     Referral Priority:   Routine     Referral Type:   Eval and Treat     Referral Reason:   Specialty Services Required     Referred to Provider:   Sarah Lopez MD     Requested Specialty:   Orthopedic Surgery     Number of Visits Requested:   1    Amb External Referral To Pain Clinic     Referral Priority:   Routine     Referral Type:   Eval and Treat     Referral Reason:   Specialty Services Required     Referred to Provider:   Loren Wilder MD     Requested Specialty:   Pain Medicine     Number of Visits Requested:   1     Orders Placed This Encounter   Medications    sertraline (ZOLOFT) 100 MG tablet     Sig: TAKE 1 TABLET DAILY     Dispense:  90 tablet     Refill:  3       Return in about 6 months (around 9/9/2020).       Cierra Novoa MD

## 2020-03-23 ENCOUNTER — TELEPHONE (OUTPATIENT)
Dept: NEUROLOGY | Age: 65
End: 2020-03-23

## 2020-05-18 DIAGNOSIS — E03.9 ACQUIRED HYPOTHYROIDISM: ICD-10-CM

## 2020-05-18 LAB
ANION GAP SERPL CALCULATED.3IONS-SCNC: 14 MEQ/L (ref 9–15)
BUN BLDV-MCNC: 12 MG/DL (ref 8–23)
CALCIUM SERPL-MCNC: 9.8 MG/DL (ref 8.5–9.9)
CHLORIDE BLD-SCNC: 104 MEQ/L (ref 95–107)
CHOLESTEROL, TOTAL: 171 MG/DL (ref 0–199)
CO2: 23 MEQ/L (ref 20–31)
CREAT SERPL-MCNC: 0.62 MG/DL (ref 0.5–0.9)
CREATININE URINE: 200.9 MG/DL
GFR AFRICAN AMERICAN: >60
GFR NON-AFRICAN AMERICAN: >60
GLUCOSE BLD-MCNC: 102 MG/DL (ref 70–99)
HBA1C MFR BLD: 6.5 % (ref 4.8–5.9)
HDLC SERPL-MCNC: 33 MG/DL (ref 40–59)
LDL CHOLESTEROL CALCULATED: 86 MG/DL (ref 0–129)
MICROALBUMIN UR-MCNC: 1.7 MG/DL
MICROALBUMIN/CREAT UR-RTO: 8.5 MG/G (ref 0–30)
POTASSIUM SERPL-SCNC: 4.5 MEQ/L (ref 3.4–4.9)
SODIUM BLD-SCNC: 141 MEQ/L (ref 135–144)
T4 FREE: 1.52 NG/DL (ref 0.84–1.68)
TRIGL SERPL-MCNC: 261 MG/DL (ref 0–150)
TSH SERPL DL<=0.05 MIU/L-ACNC: 2.03 UIU/ML (ref 0.44–3.86)

## 2020-05-20 ENCOUNTER — VIRTUAL VISIT (OUTPATIENT)
Dept: ENDOCRINOLOGY | Age: 65
End: 2020-05-20
Payer: MEDICARE

## 2020-05-20 PROCEDURE — 99213 OFFICE O/P EST LOW 20 MIN: CPT | Performed by: INTERNAL MEDICINE

## 2020-05-20 RX ORDER — AMLODIPINE BESYLATE 10 MG/1
TABLET ORAL
Qty: 90 TABLET | Refills: 1 | Status: SHIPPED | OUTPATIENT
Start: 2020-05-20 | End: 2020-11-16

## 2020-05-20 RX ORDER — LEVOTHYROXINE SODIUM 0.1 MG/1
100 TABLET ORAL DAILY
Qty: 90 TABLET | Refills: 3 | Status: SHIPPED | OUTPATIENT
Start: 2020-05-20 | End: 2020-12-22 | Stop reason: SDUPTHER

## 2020-05-20 RX ORDER — ATORVASTATIN CALCIUM 10 MG/1
TABLET, FILM COATED ORAL
Qty: 90 TABLET | Refills: 3 | Status: SHIPPED | OUTPATIENT
Start: 2020-05-20 | End: 2020-12-22 | Stop reason: SDUPTHER

## 2020-05-20 NOTE — PROGRESS NOTES
INSTRUCTIONS:  \"[x]\" Indicates a positive item  \"[]\" Indicates a negative item  -- DELETE ALL ITEMS NOT EXAMINED]  [] Alert  [] Oriented to person/place/time    [] No apparent distress  [] Toxic appearing    [] Face flushed appearing [] Sclera clear  [] Lips are cyanotic      [] Breathing appears normal  [] Appears tachypneic      [] Rash on visible skin    [] Cranial Nerves II-XII grossly intact    [] Motor grossly intact in visible upper extremities    [] Motor grossly intact in visible lower extremities    [] Normal Mood  [] Anxious appearing    [] Depressed appearing  [] Confused appearing      [] Poor short term memory  [] Poor long term memory    [] OTHER:      Due to this being a TeleHealth encounter, evaluation of the following organ systems is limited: Vitals/Constitutional/EENT/Resp/CV/GI//MS/Neuro/Skin/Heme-Lymph-Imm. ASSESSMENT/PLAN:   Diagnosis Orders   1. Acquired hypothyroidism  T4, Free    TSH without Reflex    levothyroxine (SYNTHROID) 100 MCG tablet   2.  Uncontrolled type 2 diabetes mellitus without complication, without long-term current use of insulin (HCC)  Basic Metabolic Panel    Hemoglobin A1C    Microalbumin / Creatinine Urine Ratio    metFORMIN (GLUCOPHAGE) 500 MG tablet     Orders Placed This Encounter   Procedures    T4, Free     Standing Status:   Future     Standing Expiration Date:   5/20/2021    TSH without Reflex     Standing Status:   Future     Standing Expiration Date:   5/20/2021    Basic Metabolic Panel     Standing Status:   Future     Standing Expiration Date:   5/20/2021    Hemoglobin A1C     Standing Status:   Future     Standing Expiration Date:   5/20/2021    Microalbumin / Creatinine Urine Ratio     Standing Status:   Future     Standing Expiration Date:   5/20/2021     Orders Placed This Encounter   Medications    levothyroxine (SYNTHROID) 100 MCG tablet     Sig: Take 1 tablet by mouth Daily     Dispense:  90 tablet     Refill:  03    metFORMIN (GLUCOPHAGE) 500 MG tablet     Sig: TAKE 1 TABLET TWICE A DAY WITH MEALS     Dispense:  180 tablet     Refill:  3    atorvastatin (LIPITOR) 10 MG tablet     Sig: TAKE 1 TABLET DAILY     Dispense:  90 tablet     Refill:  3    amLODIPine (NORVASC) 10 MG tablet     Sig: TAKE 1 TABLET DAILY     Dispense:  90 tablet     Refill:  1     Total time spent was 16 minutes    An  electronic signature was used to authenticate this note. --Jeanie Epps MD on 5/20/2020 at 1:31 PM        Pursuant to the emergency declaration under the [address] [place] [address], [facility] waiver authority and the [employer] Act, this Virtual  Visit was conducted, with patient's consent, to reduce the patient's risk of exposure to COVID-19 and provide continuity of care for an established patient. Services were provided through a video synchronous discussion virtually to substitute for in-person clinic visit.

## 2020-06-12 ENCOUNTER — TELEPHONE (OUTPATIENT)
Dept: ENDOCRINOLOGY | Age: 65
End: 2020-06-12

## 2020-06-18 ENCOUNTER — HOSPITAL ENCOUNTER (OUTPATIENT)
Dept: WOMENS IMAGING | Age: 65
Discharge: HOME | End: 2020-06-20
Payer: MEDICARE

## 2020-06-18 PROCEDURE — 77067 SCR MAMMO BI INCL CAD: CPT

## 2020-09-15 PROBLEM — M25.519 SHOULDER JOINT PAIN: Status: ACTIVE | Noted: 2020-09-15

## 2020-09-15 PROBLEM — M17.0 PRIMARY OSTEOARTHRITIS OF BOTH KNEES: Status: ACTIVE | Noted: 2020-09-15

## 2020-09-15 PROBLEM — Z86.39 HISTORY OF DIABETES MELLITUS: Status: ACTIVE | Noted: 2020-09-15

## 2020-09-15 PROBLEM — M25.569 KNEE PAIN: Status: ACTIVE | Noted: 2020-09-15

## 2020-09-15 PROBLEM — Z85.9 PERSONAL HISTORY OF MALIGNANT NEOPLASM: Status: ACTIVE | Noted: 2020-09-15

## 2020-09-16 ENCOUNTER — OFFICE VISIT (OUTPATIENT)
Dept: NEUROLOGY | Age: 65
End: 2020-09-16
Payer: MEDICARE

## 2020-09-16 VITALS
HEART RATE: 84 BPM | BODY MASS INDEX: 37.02 KG/M2 | WEIGHT: 209 LBS | DIASTOLIC BLOOD PRESSURE: 79 MMHG | SYSTOLIC BLOOD PRESSURE: 144 MMHG

## 2020-09-16 DIAGNOSIS — G89.29 CHRONIC PAIN OF BOTH KNEES: ICD-10-CM

## 2020-09-16 DIAGNOSIS — M25.561 CHRONIC PAIN OF BOTH KNEES: ICD-10-CM

## 2020-09-16 DIAGNOSIS — M25.562 CHRONIC PAIN OF BOTH KNEES: ICD-10-CM

## 2020-09-16 PROBLEM — R26.0 ATAXIC GAIT: Status: ACTIVE | Noted: 2020-09-16

## 2020-09-16 LAB
C-REACTIVE PROTEIN, HIGH SENSITIVITY: 1.7 MG/L (ref 0–5)
RHEUMATOID FACTOR: <10 IU/ML (ref 0–14)
TOTAL CK: 54 U/L (ref 0–170)

## 2020-09-16 PROCEDURE — 99214 OFFICE O/P EST MOD 30 MIN: CPT | Performed by: PSYCHIATRY & NEUROLOGY

## 2020-09-16 RX ORDER — KETOROLAC TROMETHAMINE 5 MG/ML
SOLUTION OPHTHALMIC
COMMUNITY
Start: 2020-08-24 | End: 2021-02-12 | Stop reason: ALTCHOICE

## 2020-09-16 RX ORDER — SERTRALINE HYDROCHLORIDE 100 MG/1
TABLET, FILM COATED ORAL
Qty: 90 TABLET | Refills: 3 | Status: SHIPPED | OUTPATIENT
Start: 2020-09-16 | End: 2020-09-16 | Stop reason: SDUPTHER

## 2020-09-16 RX ORDER — IBUPROFEN 800 MG/1
800 TABLET ORAL EVERY 6 HOURS PRN
Qty: 120 TABLET | Refills: 1 | Status: SHIPPED | OUTPATIENT
Start: 2020-09-16 | End: 2020-09-16 | Stop reason: SDUPTHER

## 2020-09-16 RX ORDER — PREDNISOLONE ACETATE 10 MG/ML
SUSPENSION/ DROPS OPHTHALMIC
COMMUNITY
Start: 2020-08-24 | End: 2021-02-12 | Stop reason: ALTCHOICE

## 2020-09-16 RX ORDER — IBUPROFEN 800 MG/1
800 TABLET ORAL
COMMUNITY
Start: 2019-05-22 | End: 2020-09-16 | Stop reason: SDUPTHER

## 2020-09-16 ASSESSMENT — ENCOUNTER SYMPTOMS
VOMITING: 0
SHORTNESS OF BREATH: 0
CHOKING: 0
TROUBLE SWALLOWING: 0
NAUSEA: 0
PHOTOPHOBIA: 0
COLOR CHANGE: 0
BACK PAIN: 0

## 2020-09-16 NOTE — PROGRESS NOTES
Subjective:      Patient ID: Charu Goldsmith is a 76 y.o. female who presents today for:  Chief Complaint   Patient presents with    6 Month Follow-Up     Pt. states that she is doing okay. Patient is still having some neck pain but she states her pain is worse in the arms and the knee area.  Other     patient is wondering if she would be able to get a prescription for a handicap plac       HPI 76 female with History of neck pain. Patient was seen here in 2028 prior to this. Seen in 2018. She has minor cord compression was seen by Dr. Ayaka Bustamante and is noticed surgical candidate she has other symptoms of frozen shoulders arm pains and multiple other symptoms of joint pains. We only see her for neck pain. We further recommended evaluation with Dr. Asia Lowe for shoulder pain as well. Patient was referred to Dr. Amy Turcios for pain management. He was seen by Dr. Asia Lowe and she is recommended to have bilateral shoulder replacements. She is somewhat confused about what is told by each physician. In any case she has knee issues as well most of this all appears to be arthritis this weakness and we will rule out polymyalgia rheumatica. She is not any falls injuries trauma. She does not have a primary care doctor and therefore we refill her prescriptions. At any falls injuries or trauma.     Past Medical History:   Diagnosis Date    Cancer ([address]     Depression     Type II diabetes mellitus, uncontrolled ([facility] [address]      Past Surgical History:   Procedure Laterality Date    BREAST LUMPECTOMY Left 1996     SECTION  0     SECTION  1985     Social History     Socioeconomic History    Marital status:      Spouse name: Not on file    Number of children: Not on file    Years of education: Not on file    Highest education level: Not on file   Occupational History    Not on file   Social Needs    Financial resource strain: Not on file    Food insecurity     Worry: Not on file Inability: Not on file    Transportation needs     Medical: Not on file     Non-medical: Not on file   Tobacco Use    Smoking status: Current Every Day Smoker     Types: Cigarettes    Smokeless tobacco: Never Used    Tobacco comment: trying to cut back   Substance and Sexual Activity    Alcohol use: Not on file    Drug use: Not on file    Sexual activity: Not on file   Lifestyle    Physical activity     Days per week: Not on file     Minutes per session: Not on file    Stress: Not on file   Relationships    Social connections     Talks on phone: Not on file     Gets together: Not on file     Attends [employer] service: Not on file     Active member of club or organization: Not on file     Attends meetings of clubs or organizations: Not on file     Relationship status: Not on file    Intimate partner violence     Fear of current or ex partner: Not on file     Emotionally abused: Not on file     Physically abused: Not on file     Forced sexual activity: Not on file   Other Topics Concern    Not on file   Social History Narrative    Not on file     Family History   Problem Relation Age of Onset    Heart Disease Mother     Cancer Father      No Known Allergies    Current Outpatient Medications   Medication Sig Dispense Refill    ketorolac (ACULAR) 0.5 % ophthalmic solution USE AS DIRECTED BY PHYSICIAN, IN OPERATIVE EYE, BEGINNING ONE DAY AFTER SURGERY      prednisoLONE acetate (PRED FORTE) 1 % ophthalmic suspension USE AS DIRECTED BY PHYSICIAN, IN OPERATIVE EYE, BEGINNING ONE DAY AFTER SURGERY      sertraline (ZOLOFT) 100 MG tablet TAKE 1 TABLET DAILY 90 tablet 3    ibuprofen (ADVIL;MOTRIN) 800 MG tablet Take 1 tablet by mouth every 6 hours as needed for Pain 120 tablet 1    levothyroxine (SYNTHROID) 100 MCG tablet Take 1 tablet by mouth Daily 90 tablet 03    metFORMIN (GLUCOPHAGE) 500 MG tablet TAKE 1 TABLET TWICE A DAY WITH MEALS 180 tablet 3    atorvastatin (LIPITOR) 10 MG tablet TAKE 1 TABLET DAILY 90 tablet 3    amLODIPine (NORVASC) 10 MG tablet TAKE 1 TABLET DAILY 90 tablet 1    blood glucose monitor strips Test BG once daily, DX: E11.9, NIDDM 50 strip 6    Lancets MISC Test BG once daily, DX: E11.65, NIDDM 100 each 3    Blood Glucose Monitoring Suppl MIGUEL ANGEL Test BG once daily, DX: E11.65, NIDDM 1 Device 0    Alcohol Swabs PADS Bid 100 each 06    meloxicam (MOBIC) 15 MG tablet Take 15 mg by mouth daily 1 tablet daily for one week then alternate every other week      cyanocobalamin 1000 MCG/ML injection Inject 1 mL into the muscle once for 1 dose 10 mL 5    Syringe/Needle, Disp, (SYRINGE 3CC/25GX1\") 25G X 1\" 3 ML MISC Use for monthly B-12 injection (Patient not taking: Reported on 9/16/2020) 10 each 3    naproxen (NAPROSYN) 500 MG tablet Take 1 tablet by mouth 2 times daily (with meals) (Patient not taking: Reported on 3/9/2020) 60 tablet 3     No current facility-administered medications for this visit. Review of Systems   Constitutional: Negative for fever. HENT: Negative for ear pain, tinnitus and trouble swallowing. Eyes: Negative for photophobia and visual disturbance. Respiratory: Negative for choking and shortness of breath. Cardiovascular: Negative for chest pain and palpitations. Gastrointestinal: Negative for nausea and vomiting. Musculoskeletal: Positive for gait problem, joint swelling, myalgias and neck pain. Negative for back pain and neck stiffness. Skin: Negative for color change. Allergic/Immunologic: Negative for food allergies. Neurological: Positive for weakness. Negative for dizziness, tremors, seizures, syncope, facial asymmetry, speech difficulty, light-headedness, numbness and headaches. Psychiatric/Behavioral: Negative for behavioral problems, confusion, hallucinations and sleep disturbance.        Objective:   BP (!) 144/79 (Site: Right Upper Arm, Position: Sitting, Cuff Size: Large Adult)   Pulse 84   Wt 209 lb (94.8 kg)   BMI 37.02 kg/m²     Physical Exam  Vitals signs reviewed. Eyes:      Pupils: Pupils are equal, round, and reactive to light. Neck:      Musculoskeletal: Normal range of motion. Cardiovascular:      Rate and Rhythm: Normal rate and regular rhythm. Heart sounds: No murmur. Pulmonary:      Effort: Pulmonary effort is normal.      Breath sounds: Normal breath sounds. Abdominal:      General: Bowel sounds are normal.   Musculoskeletal: Normal range of motion. Skin:     General: Skin is warm. Neurological:      Mental Status: She is alert and oriented to person, place, and time. Cranial Nerves: No cranial nerve deficit. Sensory: No sensory deficit. Motor: No abnormal muscle tone. Coordination: Coordination normal.      Deep Tendon Reflexes: Reflexes are normal and symmetric. Babinski sign absent on the right side. Babinski sign absent on the left side. Psychiatric:         Mood and Affect: Mood normal.     Lateral shoulder weakness with cervical spine mild compression without session of surgical spine. Patient is hyperreflexic patient was seen by neurosurgery large knees are notable    [facility] Digital Screen Self Referral W Or Wo Cad Bilateral    Result Date: 6/18/2020  EXAMINATION: [facility] DIGITAL SCREEN SELF REFERRAL W OR WO CAD BILATERAL CLINICAL HISTORY:Z12.31 Breast cancer screening by mammogram ICD10 COMPARISON: 6/14/2019 FINDINGS:3-D tomosynthesis imaging of the bilateral breasts was performed. There are scattered fibroglandular densities within each breast.   There are no suspicious masses, areas of architectural distortion or suspicious areas of microcalcifications. CAD analysis was performed and used in the interpretation. BI-RADS 2: BENIGN FINDINGS. ROUTINE FOLLOW-UP MAMMOGRAPHY IS SUGGESTED IN ONE YEAR. There are scattered fibroglandular densities within each breast.   Board Certified Radiologists. Accredited by the ACR and FDA. MAMMOGRAPHY IS VERY IMPORTANT TO YOUR HEALTH.   THE AMERICAN CANCER SOCIETY GUIDELINES RECOMMEND THAT WOMEN 36YEARS OF AGE AND OLDER SHOULD HAVE A MAMMOGRAM EVERY YEAR. A REMINDER LETTER WILL BE SENT AT THE APPROPRIATE TIME.  THIS FACILITY UTILIZES A REMINDER SYSTEM TO ENSURE ALL PATIENTS RECEIVE REMINDER NOTIFICATIONS AT THE APPROPRIATE TIME BASED ON THE RECOMMENDATIONS OF THIS EXAM. THIS INCLUDES REMINDERS FOR ROUTINE  SCREENING MAMMOGRAMS, DIAGNOSTIC MAMMOGRAMS IN WHICH THE PATIENT IS ASKED TO RETURN FOR ADDITIONAL VIEWS, OR OTHER BREAST IMAGING INTERVENTIONS WHEN APPROPRIATE. THE PATIENT WILL BE PLACED IN THE APPROPRIATE REMINDER SYSTEM INCLUDING A REMINDER AT THE APPROPRIATE TIME FOR ANY PENDING ADDITIONAL VIEWS. No results found for: WBC, RBC, HGB, HCT, MCV, MCH, MCHC, RDW, PLT, MPV  Lab Results   Component Value Date     05/18/2020    K 4.5 05/18/2020     05/18/2020    CO2 23 05/18/2020    BUN 12 05/18/2020    CREATININE 0.62 05/18/2020    GFRAA >60.0 05/18/2020    LABGLOM >60.0 05/18/2020    GLUCOSE 102 05/18/2020    PROT 7.1 05/15/2019    PROT 7.1 04/26/2018    LABALBU 4.2 05/15/2019    CALCIUM 9.8 05/18/2020    BILITOT 0.4 05/15/2019    ALKPHOS 54 05/15/2019    AST 10 05/15/2019    ALT 13 05/15/2019     No results found for: PROTIME, INR  Lab Results   Component Value Date    TSH 2.030 05/18/2020    EXCYUIUH97 518 07/21/2016    FOLATE 15.8 07/21/2016     Lab Results   Component Value Date    TRIG 261 05/18/2020    HDL 33 05/18/2020    LDLCALC 86 05/18/2020     No results found for: Jaden Román, LABBENZ, CANNAB, COCAINESCRN, LABMETH, OPIATESCREENURINE, PHENCYCLIDINESCREENURINE, PPXUR, ETOH  No results found for: LITHIUM, DILFRTOT, VALPROATE    Assessment:       Diagnosis Orders   1. Cervical radiculopathy     2. Chronic pain of both knees  High Sensitivity Crp    Sedimentation Rate    CK   3. Adhesive capsulitis of both shoulders     4. Ataxic gait     Cervical spine mild stenosis radiculopathy.   Patient was referred to orthopedics for what appeared to be frozen shoulders though this turned out to be adhesive capsulitis. She requires surgical intervention. She is here to discuss this with me. She should follow the orthopedics direction. This patient also has large knees and may need replacement as well. She has gait issues and will arrange for level test to make sure she does not have polymyalgia rheumatica. We will arrange for his rheumatoid evaluation as well. For now we will keep her on the same medications unless something changes. She does not have a primary care doctor and therefore we have refilled her prescription`      Plan:      Orders Placed This Encounter   Procedures    High Sensitivity Crp     Standing Status:   Future     Standing Expiration Date:   9/16/2021    Sedimentation Rate     Standing Status:   Future     Standing Expiration Date:   9/16/2021    CK     Standing Status:   Future     Standing Expiration Date:   9/16/2021     Orders Placed This Encounter   Medications    sertraline (ZOLOFT) 100 MG tablet     Sig: TAKE 1 TABLET DAILY     Dispense:  90 tablet     Refill:  3    ibuprofen (ADVIL;MOTRIN) 800 MG tablet     Sig: Take 1 tablet by mouth every 6 hours as needed for Pain     Dispense:  120 tablet     Refill:  1       Return in about 6 months (around 3/16/2021).       Mo Roach MD

## 2020-09-18 RX ORDER — IBUPROFEN 800 MG/1
800 TABLET ORAL EVERY 6 HOURS PRN
Qty: 360 TABLET | Refills: 0 | Status: SHIPPED | OUTPATIENT
Start: 2020-09-18 | End: 2020-12-17

## 2020-09-18 RX ORDER — SERTRALINE HYDROCHLORIDE 100 MG/1
TABLET, FILM COATED ORAL
Qty: 90 TABLET | Refills: 3 | Status: SHIPPED | OUTPATIENT
Start: 2020-09-18 | End: 2021-01-13 | Stop reason: SDUPTHER

## 2020-11-16 RX ORDER — AMLODIPINE BESYLATE 10 MG/1
TABLET ORAL
Qty: 90 TABLET | Refills: 3 | Status: SHIPPED | OUTPATIENT
Start: 2020-11-16 | End: 2020-12-22 | Stop reason: SDUPTHER

## 2020-11-20 ENCOUNTER — VIRTUAL VISIT (OUTPATIENT)
Dept: ENDOCRINOLOGY | Age: 65
End: 2020-11-20
Payer: MEDICARE

## 2020-11-20 PROCEDURE — 99442 PR PHYS/QHP TELEPHONE EVALUATION 11-20 MIN: CPT | Performed by: INTERNAL MEDICINE

## 2020-11-20 NOTE — PROGRESS NOTES
2020    TELEHEALTH EVALUATION -- Audio/Visual (During SIA-51 public health emergency)    Due to COVID 19 outbreak, patient's office visit was converted to a virtual visit. Patient was contacted and agreed to proceed with a virtual visit via Telephone Visit  The risks and benefits of converting to a virtual visit were discussed in light of the current infectious disease epidemic. Patient also understood that insurance coverage and co-pays are up to their individual insurance plans. HPI: Telephone visit patient was at home I was at my office    Audrey Ferguson (:  1955) has requested an audio/video evaluation for the following concern(s):    Follow-up of type 2 diabetes hypothyroidism patient also has hypercholesterolemia no recent lipid panel has been ordered currently on diabetes patient on metformin 500 mg twice a day hypothyroidism on levothyroxine 100 mcg daily    Labs were reviewed from May 5, 2020 no recent labs to review    Results for Bonilla Coleman (MRN [id number]) as of 2020 18:16   Ref.  Range 2020 15:05   Sodium Latest Ref Range: 135 - 144 mEq/L 141   Potassium Latest Ref Range: 3.4 - 4.9 mEq/L 4.5   Chloride Latest Ref Range: 95 - 107 mEq/L 104   CO2 Latest Ref Range: 20 - 31 mEq/L 23   BUN Latest Ref Range: 8 - 23 mg/dL 12   Creatinine Latest Ref Range: 0.50 - 0.90 mg/dL 0.62   Anion Gap Latest Ref Range: 9 - 15 mEq/L 14   GFR Non- Latest Ref Range: >60  >60.0   GFR African American Latest Ref Range: >60  >60.0   Glucose Latest Ref Range: 70 - 99 mg/dL 102 (H)   Calcium Latest Ref Range: 8.5 - 9.9 mg/dL 9.8   Cholesterol, Total Latest Ref Range: 0 - 199 mg/dL 171   HDL Cholesterol Latest Ref Range: 40 - 59 mg/dL 33 (L)   LDL Calculated Latest Ref Range: 0 - 129 mg/dL 86   Triglycerides Latest Ref Range: 0 - 150 mg/dL 261 (H)   Hemoglobin A1C Latest Ref Range: 4.8 - 5.9 % 6.5 (H)   TSH Latest Ref Range: 0.440 - 3.860 uIU/mL 2.030   T4 Free Latest Ref Range: 0.84 - 1.68 ng/dL 1.52     Patient Active Problem List   Diagnosis    Hypothyroid    Obesity    Vitamin B12 deficiency    Uncontrolled diabetes mellitus type 2 without complications    DM2 (diabetes mellitus, type 2) (HCC)    Adhesive capsulitis of both shoulders    Herniated nucleus pulposus, C5-6    Herniated nucleus pulposus, C6-7 left    Smoker    HTN (hypertension)    Hyperlipemia, mixed    Ingrowing nail    Metabolic syndrome    Radicular pain in right arm    Cervical radiculopathy    History of diabetes mellitus    Knee pain    Personal history of malignant neoplasm    Primary osteoarthritis of both knees    Shoulder joint pain    Ataxic gait       Review of Systems    Prior to Visit Medications    Medication Sig Taking?  Authorizing Provider   amLODIPine (NORVASC) 10 MG tablet TAKE 1 TABLET DAILY  Yasmany Vasquez MD   ibuprofen (ADVIL;MOTRIN) 800 MG tablet Take 1 tablet by mouth every 6 hours as needed for Pain  Jose Gutiérrez MD   sertraline (ZOLOFT) 100 MG tablet TAKE 1 TABLET DAILY  Alvaro Quiroga MD   ketorolac (ACULAR) 0.5 % ophthalmic solution USE AS DIRECTED BY PHYSICIAN, IN OPERATIVE EYE, BEGINNING ONE DAY AFTER SURGERY  Historical Provider, MD   prednisoLONE acetate (PRED FORTE) 1 % ophthalmic suspension USE AS DIRECTED BY PHYSICIAN, IN OPERATIVE EYE, BEGINNING ONE DAY AFTER SURGERY  Historical Provider, MD   levothyroxine (SYNTHROID) 100 MCG tablet Take 1 tablet by mouth Daily  Ray Reyes MD   metFORMIN (GLUCOPHAGE) 500 MG tablet TAKE 1 TABLET TWICE A DAY WITH MEALS  Ray Reyes MD   atorvastatin (LIPITOR) 10 MG tablet TAKE 1 TABLET DAILY  Ray Reyes MD   blood glucose monitor strips Test BG once daily, DX: E11.9, NIDDM  Ray Reyes MD   Lancets MISC Test BG once daily, DX: E11.65, NIDDM  Yasmany Vasquez MD   meloxicam (MOBIC) 15 MG tablet Take 15 mg by mouth daily 1 tablet daily for one week then alternate every other week  Historical Provider, MD   cyanocobalamin 1000 MCG/ML injection Inject 1 mL into the muscle once for 1 dose  Laura Cyr MD   Syringe/Needle, Disp, (SYRINGE 3CC/25GX1\") 25G X 1\" 3 ML MISC Use for monthly B-12 injection  Patient not taking: Reported on 9/16/2020  Laura Cyr MD   naproxen (NAPROSYN) 500 MG tablet Take 1 tablet by mouth 2 times daily (with meals)  Patient not taking: Reported on 3/9/2020  Jacquie Roman MD   Blood Glucose Monitoring Suppl MIGUEL ANGEL Test BG once daily, DX: E11.65, NIDDM  Laura Cyr MD   Alcohol Swabs PADS Bid  Laura Cyr MD       Social History     Tobacco Use    Smoking status: Current Every Day Smoker     Types: Cigarettes    Smokeless tobacco: Never Used    Tobacco comment: trying to cut back   Substance Use Topics    Alcohol use: Not on file    Drug use: Not on file            PHYSICAL EXAMINATION:  [ INSTRUCTIONS:  \"[x]\" Indicates a positive item  \"[]\" Indicates a negative item  -- DELETE ALL ITEMS NOT EXAMINED]  [] Alert  [] Oriented to person/place/time    [] No apparent distress  [] Toxic appearing    [] Face flushed appearing [] Sclera clear  [] Lips are cyanotic      [] Breathing appears normal  [] Appears tachypneic      [] Rash on visible skin    [] Cranial Nerves II-XII grossly intact    [] Motor grossly intact in visible upper extremities    [] Motor grossly intact in visible lower extremities    [] Normal Mood  [] Anxious appearing    [] Depressed appearing  [] Confused appearing      [] Poor short term memory  [] Poor long term memory    [] OTHER:      Due to this being a TeleHealth encounter, evaluation of the following organ systems is limited: Vitals/Constitutional/EENT/Resp/CV/GI//MS/Neuro/Skin/Heme-Lymph-Imm. ASSESSMENT/PLAN:     Diagnosis Orders   1. Type 2 diabetes mellitus with other specified complication, without long-term current use of insulin (HCC)  Lipid Panel   2.  Acquired hypothyroidism       Orders Placed This Encounter   Procedures    Lipid Panel     Standing Status:   Future     Standing Expiration

## 2020-11-30 DIAGNOSIS — E03.9 ACQUIRED HYPOTHYROIDISM: ICD-10-CM

## 2020-11-30 DIAGNOSIS — E11.69 TYPE 2 DIABETES MELLITUS WITH OTHER SPECIFIED COMPLICATION, WITHOUT LONG-TERM CURRENT USE OF INSULIN (HCC): ICD-10-CM

## 2020-11-30 LAB
ANION GAP SERPL CALCULATED.3IONS-SCNC: 10 MEQ/L (ref 9–15)
BUN BLDV-MCNC: 17 MG/DL (ref 8–23)
CALCIUM SERPL-MCNC: 8.9 MG/DL (ref 8.5–9.9)
CHLORIDE BLD-SCNC: 107 MEQ/L (ref 95–107)
CHOLESTEROL, TOTAL: 126 MG/DL (ref 0–199)
CO2: 25 MEQ/L (ref 20–31)
CREAT SERPL-MCNC: 0.89 MG/DL (ref 0.5–0.9)
CREATININE URINE: 191 MG/DL
GFR AFRICAN AMERICAN: >60
GFR NON-AFRICAN AMERICAN: >60
GLUCOSE BLD-MCNC: 110 MG/DL (ref 70–99)
HBA1C MFR BLD: 6.2 % (ref 4.8–5.9)
HDLC SERPL-MCNC: 37 MG/DL (ref 40–59)
LDL CHOLESTEROL CALCULATED: 54 MG/DL (ref 0–129)
MICROALBUMIN UR-MCNC: <1.2 MG/DL
MICROALBUMIN/CREAT UR-RTO: NORMAL MG/G (ref 0–30)
POTASSIUM SERPL-SCNC: 3.8 MEQ/L (ref 3.4–4.9)
SODIUM BLD-SCNC: 142 MEQ/L (ref 135–144)
T4 FREE: 1.3 NG/DL (ref 0.84–1.68)
TRIGL SERPL-MCNC: 176 MG/DL (ref 0–150)
TSH SERPL DL<=0.05 MIU/L-ACNC: 1.73 UIU/ML (ref 0.44–3.86)

## 2020-12-08 ENCOUNTER — TELEPHONE (OUTPATIENT)
Dept: ENDOCRINOLOGY | Age: 65
End: 2020-12-08

## 2020-12-08 NOTE — TELEPHONE ENCOUNTER
Hemoglobin A1c was 6.2 thyroid results were normal cholesterol was 126 urine and kidney tests were normal

## 2020-12-22 RX ORDER — ATORVASTATIN CALCIUM 10 MG/1
TABLET, FILM COATED ORAL
Qty: 90 TABLET | Refills: 3 | Status: SHIPPED | OUTPATIENT
Start: 2020-12-22 | End: 2022-01-18

## 2020-12-22 RX ORDER — LEVOTHYROXINE SODIUM 0.1 MG/1
100 TABLET ORAL DAILY
Qty: 90 TABLET | Refills: 3 | Status: SHIPPED | OUTPATIENT
Start: 2020-12-22 | End: 2021-12-27

## 2020-12-22 RX ORDER — AMLODIPINE BESYLATE 10 MG/1
TABLET ORAL
Qty: 90 TABLET | Refills: 3 | Status: SHIPPED | OUTPATIENT
Start: 2020-12-22 | End: 2022-03-01

## 2021-01-13 ENCOUNTER — OFFICE VISIT (OUTPATIENT)
Dept: NEUROLOGY | Age: 66
End: 2021-01-13
Payer: MEDICARE

## 2021-01-13 VITALS
SYSTOLIC BLOOD PRESSURE: 139 MMHG | TEMPERATURE: 97.3 F | BODY MASS INDEX: 36.58 KG/M2 | HEART RATE: 82 BPM | DIASTOLIC BLOOD PRESSURE: 68 MMHG | WEIGHT: 206.5 LBS

## 2021-01-13 DIAGNOSIS — R26.0 ATAXIC GAIT: ICD-10-CM

## 2021-01-13 DIAGNOSIS — M54.12 CERVICAL RADICULOPATHY: Primary | ICD-10-CM

## 2021-01-13 PROCEDURE — 99213 OFFICE O/P EST LOW 20 MIN: CPT | Performed by: PSYCHIATRY & NEUROLOGY

## 2021-01-13 RX ORDER — SERTRALINE HYDROCHLORIDE 100 MG/1
TABLET, FILM COATED ORAL
Qty: 90 TABLET | Refills: 3 | Status: SHIPPED | OUTPATIENT
Start: 2021-01-13 | End: 2021-07-12 | Stop reason: SDUPTHER

## 2021-01-13 ASSESSMENT — ENCOUNTER SYMPTOMS
SHORTNESS OF BREATH: 0
CHOKING: 0
TROUBLE SWALLOWING: 0
VOMITING: 0
BACK PAIN: 0
COLOR CHANGE: 0
PHOTOPHOBIA: 0
NAUSEA: 0

## 2021-01-13 NOTE — PROGRESS NOTES
 Sexual activity: Not on file   Lifestyle    Physical activity     Days per week: Not on file     Minutes per session: Not on file    Stress: Not on file   Relationships    Social connections     Talks on phone: Not on file     Gets together: Not on file     Attends Gnosticism service: Not on file     Active member of club or organization: Not on file     Attends meetings of clubs or organizations: Not on file     Relationship status: Not on file    Intimate partner violence     Fear of current or ex partner: Not on file     Emotionally abused: Not on file     Physically abused: Not on file     Forced sexual activity: Not on file   Other Topics Concern    Not on file   Social History Narrative    Not on file     Family History   Problem Relation Age of Onset    Heart Disease Mother     Cancer Father      No Known Allergies    Current Outpatient Medications   Medication Sig Dispense Refill    amLODIPine (NORVASC) 10 MG tablet Once a day 90 tablet 3    levothyroxine (SYNTHROID) 100 MCG tablet Take 1 tablet by mouth Daily 90 tablet 03    metFORMIN (GLUCOPHAGE) 500 MG tablet TAKE 1 TABLET TWICE A DAY WITH MEALS 180 tablet 3    atorvastatin (LIPITOR) 10 MG tablet TAKE 1 TABLET DAILY 90 tablet 3    sertraline (ZOLOFT) 100 MG tablet TAKE 1 TABLET DAILY 90 tablet 3    blood glucose monitor strips Test BG once daily, DX: E11.9, NIDDM 50 strip 6    Lancets MISC Test BG once daily, DX: E11.65, NIDDM 100 each 3    Blood Glucose Monitoring Suppl MIGUEL ANGEL Test BG once daily, DX: E11.65, NIDDM 1 Device 0    Alcohol Swabs PADS Bid 100 each 06    ibuprofen (ADVIL;MOTRIN) 800 MG tablet Take 1 tablet by mouth every 6 hours as needed for Pain 360 tablet 0    ketorolac (ACULAR) 0.5 % ophthalmic solution USE AS DIRECTED BY PHYSICIAN, IN OPERATIVE EYE, BEGINNING ONE DAY AFTER SURGERY  prednisoLONE acetate (PRED FORTE) 1 % ophthalmic suspension USE AS DIRECTED BY PHYSICIAN, IN OPERATIVE EYE, BEGINNING ONE DAY AFTER SURGERY      meloxicam (MOBIC) 15 MG tablet Take 15 mg by mouth daily 1 tablet daily for one week then alternate every other week      cyanocobalamin 1000 MCG/ML injection Inject 1 mL into the muscle once for 1 dose 10 mL 5    Syringe/Needle, Disp, (SYRINGE 3CC/25GX1\") 25G X 1\" 3 ML MISC Use for monthly B-12 injection (Patient not taking: Reported on 9/16/2020) 10 each 3    naproxen (NAPROSYN) 500 MG tablet Take 1 tablet by mouth 2 times daily (with meals) (Patient not taking: Reported on 3/9/2020) 60 tablet 3     No current facility-administered medications for this visit. Review of Systems   Constitutional: Negative for fever. HENT: Negative for ear pain, tinnitus and trouble swallowing. Eyes: Negative for photophobia and visual disturbance. Respiratory: Negative for choking and shortness of breath. Cardiovascular: Negative for chest pain and palpitations. Gastrointestinal: Negative for nausea and vomiting. Musculoskeletal: Positive for gait problem and myalgias. Negative for back pain, joint swelling, neck pain and neck stiffness. Skin: Negative for color change. Allergic/Immunologic: Negative for food allergies. Neurological: Negative for dizziness, tremors, seizures, syncope, facial asymmetry, speech difficulty, weakness, light-headedness, numbness and headaches. Psychiatric/Behavioral: Negative for behavioral problems, confusion, hallucinations and sleep disturbance. Objective:   /68 (Site: Left Upper Arm, Position: Sitting, Cuff Size: Large Adult)   Pulse 82   Temp 97.3 °F (36.3 °C) (Temporal)   Wt 206 lb 8 oz (93.7 kg)   BMI 36.58 kg/m²     Physical Exam  Vitals signs reviewed. Eyes:      Pupils: Pupils are equal, round, and reactive to light. Neck:      Musculoskeletal: Normal range of motion.    Cardiovascular: Rate and Rhythm: Normal rate and regular rhythm. Heart sounds: No murmur. Pulmonary:      Effort: Pulmonary effort is normal.      Breath sounds: Normal breath sounds. Abdominal:      General: Bowel sounds are normal.   Musculoskeletal: Normal range of motion. Skin:     General: Skin is warm. Neurological:      Mental Status: She is alert and oriented to person, place, and time. Cranial Nerves: No cranial nerve deficit. Sensory: No sensory deficit. Motor: No abnormal muscle tone. Coordination: Coordination normal.      Deep Tendon Reflexes: Reflexes are normal and symmetric. Babinski sign absent on the right side. Babinski sign absent on the left side. Psychiatric:         Mood and Affect: Mood normal.       Has gait issues and large knees. [facility] Digital Screen Self Referral W Or Wo Cad Bilateral    Result Date: 6/18/2020  EXAMINATION: [facility] DIGITAL SCREEN SELF REFERRAL W OR WO CAD BILATERAL CLINICAL HISTORY:Z12.31 Breast cancer screening by mammogram ICD10 COMPARISON: 6/14/2019 FINDINGS:3-D tomosynthesis imaging of the bilateral breasts was performed. There are scattered fibroglandular densities within each breast.   There are no suspicious masses, areas of architectural distortion or suspicious areas of microcalcifications. CAD analysis was performed and used in the interpretation. BI-RADS 2: BENIGN FINDINGS. ROUTINE FOLLOW-UP MAMMOGRAPHY IS SUGGESTED IN ONE YEAR. There are scattered fibroglandular densities within each breast.   Board Certified Radiologists. Accredited by the ACR and FDA. MAMMOGRAPHY IS VERY IMPORTANT TO YOUR HEALTH. THE AMERICAN CANCER SOCIETY GUIDELINES RECOMMEND THAT WOMEN 36YEARS OF AGE AND OLDER SHOULD HAVE A MAMMOGRAM EVERY YEAR. A REMINDER LETTER WILL BE SENT AT THE APPROPRIATE TIME.  THIS FACILITY UTILIZES A REMINDER SYSTEM TO ENSURE ALL PATIENTS RECEIVE REMINDER NOTIFICATIONS AT THE APPROPRIATE TIME BASED ON THE RECOMMENDATIONS OF THIS EXAM. THIS INCLUDES REMINDERS FOR ROUTINE  SCREENING MAMMOGRAMS, DIAGNOSTIC MAMMOGRAMS IN WHICH THE PATIENT IS ASKED TO RETURN FOR ADDITIONAL VIEWS, OR OTHER BREAST IMAGING INTERVENTIONS WHEN APPROPRIATE. THE PATIENT WILL BE PLACED IN THE APPROPRIATE REMINDER SYSTEM INCLUDING A REMINDER AT THE APPROPRIATE TIME FOR ANY PENDING ADDITIONAL VIEWS.        No results found for: WBC, RBC, HGB, HCT, MCV, MCH, MCHC, RDW, PLT, MPV  Lab Results   Component Value Date     11/30/2020    K 3.8 11/30/2020     11/30/2020    CO2 25 11/30/2020    BUN 17 11/30/2020    CREATININE 0.89 11/30/2020    GFRAA >60.0 11/30/2020    LABGLOM >60.0 11/30/2020    GLUCOSE 110 11/30/2020    PROT 7.1 05/15/2019    PROT 7.1 04/26/2018    LABALBU 4.2 05/15/2019    CALCIUM 8.9 11/30/2020    BILITOT 0.4 05/15/2019    ALKPHOS 54 05/15/2019    AST 10 05/15/2019    ALT 13 05/15/2019     No results found for: PROTIME, INR  Lab Results   Component Value Date    TSH 1.730 11/30/2020    PXRJCLFN70 518 07/21/2016    FOLATE 15.8 07/21/2016     Lab Results   Component Value Date    TRIG 176 11/30/2020    HDL 37 11/30/2020    LDLCALC 54 11/30/2020     No results found for: Ramonia Begun, LABBENZ, CANNAB, COCAINESCRN, LABMETH, OPIATESCREENURINE, PHENCYCLIDINESCREENURINE, PPXUR, ETOH  No results found for: LITHIUM, DILFRTOT, VALPROATE    Assessment: Diagnosis Orders   1. Cervical radiculopathy     2. Ataxic gait     76year-old right-handed female who initially was seen for neck pain and we had found that she had shoulder issues but further referred to Dr. Niraj Yap. She then started having issues and wait for the referral referred her to Dr. Niraj Yap. She is due for a surgical intervention since last seen we had evaluated her from neurological standpoint and she has normal rheumatoid factor and all her parameters as mentioned in my H&P are normal.  Patient truly has osteoarthritis. Patient should continue with knee replacements and she has neurological clearance for the same. She should consider pool therapy after her knee replacements as this is likely to help her. We will see her back in 6 months and earlier if concerns. We do refill her Zoloft. Plan:      No orders of the defined types were placed in this encounter. No orders of the defined types were placed in this encounter. No follow-ups on file.       Eitan Voss MD

## 2021-01-21 ENCOUNTER — OFFICE VISIT (OUTPATIENT)
Dept: ENDOCRINOLOGY | Age: 66
End: 2021-01-21
Payer: MEDICARE

## 2021-01-21 VITALS
BODY MASS INDEX: 36.32 KG/M2 | OXYGEN SATURATION: 96 % | SYSTOLIC BLOOD PRESSURE: 165 MMHG | HEIGHT: 63 IN | DIASTOLIC BLOOD PRESSURE: 82 MMHG | WEIGHT: 205 LBS | HEART RATE: 85 BPM

## 2021-01-21 DIAGNOSIS — E11.65 UNCONTROLLED TYPE 2 DIABETES MELLITUS WITH HYPERGLYCEMIA (HCC): ICD-10-CM

## 2021-01-21 DIAGNOSIS — E03.9 ACQUIRED HYPOTHYROIDISM: Primary | ICD-10-CM

## 2021-01-21 LAB
CHP ED QC CHECK: NORMAL
GLUCOSE BLD-MCNC: 132 MG/DL

## 2021-01-21 PROCEDURE — 99213 OFFICE O/P EST LOW 20 MIN: CPT | Performed by: INTERNAL MEDICINE

## 2021-01-21 PROCEDURE — 82962 GLUCOSE BLOOD TEST: CPT | Performed by: INTERNAL MEDICINE

## 2021-01-21 NOTE — PROGRESS NOTES
Subjective:      Patient ID: Rosa Elena Conrad is a 76 y.o. female. Follow-up on type 2 diabetes hypothyroidism patient scheduled for right knee replacement February 19  Diabetes  She presents for her follow-up diabetic visit. She has type 2 diabetes mellitus. Symptoms are stable. Current diabetic treatment includes oral agent (monotherapy). Her overall blood glucose range is 110-130 mg/dl. (Lab Results       Component                Value               Date                       LABA1C                   6.2 (H)             11/30/2020            )        Results for Cheryl Kim (MRN [id number]) as of 1/21/2021 11:37   Ref.  Range 11/30/2020 12:06 11/30/2020 12:12 1/21/2021 11:21   Sodium Latest Ref Range: 135 - 144 mEq/L 142     Potassium Latest Ref Range: 3.4 - 4.9 mEq/L 3.8     Chloride Latest Ref Range: 95 - 107 mEq/L 107     CO2 Latest Ref Range: 20 - 31 mEq/L 25     BUN Latest Ref Range: 8 - 23 mg/dL 17     Creatinine Latest Ref Range: 0.50 - 0.90 mg/dL 0.89     Anion Gap Latest Ref Range: 9 - 15 mEq/L 10     GFR Non- Latest Ref Range: >60  >60.0     GFR African American Latest Ref Range: >60  >60.0     Glucose Latest Units: mg/dL 110 (H)  132   Calcium Latest Ref Range: 8.5 - 9.9 mg/dL 8.9     Cholesterol, Total Latest Ref Range: 0 - 199 mg/dL 126     HDL Cholesterol Latest Ref Range: 40 - 59 mg/dL 37 (L)     LDL Calculated Latest Ref Range: 0 - 129 mg/dL 54     Triglycerides Latest Ref Range: 0 - 150 mg/dL 176 (H)     Hemoglobin A1C Latest Ref Range: 4.8 - 5.9 % 6.2 (H)     TSH Latest Ref Range: 0.440 - 3.860 uIU/mL 1.730     T4 Free Latest Ref Range: 0.84 - 1.68 ng/dL 1.30     Creatinine, Ur Latest Ref Range: Not Established mg/dL  191.0    Microalbumin Creatinine Ratio Latest Ref Range: 0.0 - 30.0 mg/G  see below    Microalbumin, Random Urine Latest Ref Range: Not Established mg/dL  <1.20      Patient Active Problem List   Diagnosis    Hypothyroid    Obesity    Vitamin B12 deficiency  Uncontrolled diabetes mellitus type 2 without complications    DM2 (diabetes mellitus, type 2) (HCC)    Adhesive capsulitis of both shoulders    Herniated nucleus pulposus, C5-6    Herniated nucleus pulposus, C6-7 left    Smoker    HTN (hypertension)    Hyperlipemia, mixed    Ingrowing nail    Metabolic syndrome    Radicular pain in right arm    Cervical radiculopathy    History of diabetes mellitus    Knee pain    Personal history of malignant neoplasm    Primary osteoarthritis of both knees    Shoulder joint pain    Ataxic gait     No Known Allergies    Current Outpatient Medications:     sertraline (ZOLOFT) 100 MG tablet, TAKE 1 TABLET DAILY, Disp: 90 tablet, Rfl: 3    amLODIPine (NORVASC) 10 MG tablet, Once a day, Disp: 90 tablet, Rfl: 3    levothyroxine (SYNTHROID) 100 MCG tablet, Take 1 tablet by mouth Daily, Disp: 90 tablet, Rfl: 03    metFORMIN (GLUCOPHAGE) 500 MG tablet, TAKE 1 TABLET TWICE A DAY WITH MEALS, Disp: 180 tablet, Rfl: 3    atorvastatin (LIPITOR) 10 MG tablet, TAKE 1 TABLET DAILY, Disp: 90 tablet, Rfl: 3    ketorolac (ACULAR) 0.5 % ophthalmic solution, USE AS DIRECTED BY PHYSICIAN, IN OPERATIVE EYE, BEGINNING ONE DAY AFTER SURGERY, Disp: , Rfl:     prednisoLONE acetate (PRED FORTE) 1 % ophthalmic suspension, USE AS DIRECTED BY PHYSICIAN, IN OPERATIVE EYE, BEGINNING ONE DAY AFTER SURGERY, Disp: , Rfl:     blood glucose monitor strips, Test BG once daily, DX: E11.9, NIDDM, Disp: 50 strip, Rfl: 6    Lancets MISC, Test BG once daily, DX: E11.65, NIDDM, Disp: 100 each, Rfl: 3    meloxicam (MOBIC) 15 MG tablet, Take 15 mg by mouth daily 1 tablet daily for one week then alternate every other week, Disp: , Rfl:     Syringe/Needle, Disp, (SYRINGE 3CC/25GX1\") 25G X 1\" 3 ML MISC, Use for monthly B-12 injection, Disp: 10 each, Rfl: 3    naproxen (NAPROSYN) 500 MG tablet, Take 1 tablet by mouth 2 times daily (with meals), Disp: 60 tablet, Rfl: 3   Blood Glucose Monitoring Suppl MIGUEL ANGEL, Test BG once daily, DX: E11.65, NIDDM, Disp: 1 Device, Rfl: 0    Alcohol Swabs PADS, Bid, Disp: 100 each, Rfl: 06    ibuprofen (ADVIL;MOTRIN) 800 MG tablet, Take 1 tablet by mouth every 6 hours as needed for Pain, Disp: 360 tablet, Rfl: 0    cyanocobalamin 1000 MCG/ML injection, Inject 1 mL into the muscle once for 1 dose, Disp: 10 mL, Rfl: 5    Review of Systems   Musculoskeletal: Positive for arthralgias. All other systems reviewed and are negative. Vitals:    01/21/21 1122 01/21/21 1124   BP: (!) 170/81 (!) 165/82   Pulse: 85    SpO2: 96%    Weight: 205 lb (93 kg)    Height: 5' 3\" (1.6 m)        Objective:   Physical Exam  Vitals signs reviewed. Constitutional:       Appearance: Normal appearance. She is obese. HENT:      Head: Normocephalic and atraumatic. Right Ear: External ear normal.      Left Ear: External ear normal.      Nose: Nose normal.   Neck:      Musculoskeletal: Normal range of motion and neck supple. Cardiovascular:      Rate and Rhythm: Normal rate. Pulmonary:      Effort: Pulmonary effort is normal.   Musculoskeletal: Normal range of motion. Neurological:      General: No focal deficit present. Mental Status: She is alert and oriented to person, place, and time. Psychiatric:         Mood and Affect: Mood normal.         Assessment:       Diagnosis Orders   1. Acquired hypothyroidism  T4, Free    TSH with Reflex   2.  Uncontrolled type 2 diabetes mellitus with hyperglycemia (HCC)  POCT Glucose    Basic Metabolic Panel    Hemoglobin A1C    Microalbumin / Creatinine Urine Ratio           Plan:      Orders Placed This Encounter   Procedures    T4, Free     Standing Status:   Future     Standing Expiration Date:   1/21/2022    TSH with Reflex     Standing Status:   Future     Standing Expiration Date:   1/21/2022    Basic Metabolic Panel     Standing Status:   Future     Standing Expiration Date:   1/21/2022    Hemoglobin A1C Standing Status:   Future     Standing Expiration Date:   1/21/2022    Microalbumin / Creatinine Urine Ratio     Standing Status:   Future     Standing Expiration Date:   1/21/2022    POCT Glucose     Continue metformin 500 g twice a day plus levothyroxine 100 mcg daily patient cleared with mild to moderate risk for her knee surgery          Darby Avelar MD

## 2021-02-12 ENCOUNTER — NURSE ONLY (OUTPATIENT)
Dept: PRIMARY CARE CLINIC | Age: 66
End: 2021-02-12

## 2021-02-12 ENCOUNTER — HOSPITAL ENCOUNTER (OUTPATIENT)
Dept: PREADMISSION TESTING | Age: 66
Discharge: HOME | End: 2021-02-16
Payer: MEDICARE

## 2021-02-12 VITALS
WEIGHT: 204.8 LBS | HEART RATE: 99 BPM | RESPIRATION RATE: 16 BRPM | OXYGEN SATURATION: 98 % | HEIGHT: 62 IN | BODY MASS INDEX: 37.69 KG/M2 | SYSTOLIC BLOOD PRESSURE: 157 MMHG | TEMPERATURE: 97.2 F | DIASTOLIC BLOOD PRESSURE: 70 MMHG

## 2021-02-12 DIAGNOSIS — Z41.9 ELECTIVE SURGERY: ICD-10-CM

## 2021-02-12 DIAGNOSIS — Z41.9 ELECTIVE SURGERY: Primary | ICD-10-CM

## 2021-02-12 LAB
ANION GAP SERPL CALCULATED.3IONS-SCNC: 11 MEQ/L (ref 9–15)
BILIRUBIN URINE: NEGATIVE
BLOOD, URINE: NEGATIVE
BUN BLDV-MCNC: 16 MG/DL (ref 8–23)
CALCIUM SERPL-MCNC: 9.4 MG/DL (ref 8.5–9.9)
CHLORIDE BLD-SCNC: 105 MEQ/L (ref 95–107)
CLARITY: CLEAR
CO2: 26 MEQ/L (ref 20–31)
COLOR: YELLOW
CREAT SERPL-MCNC: 0.59 MG/DL (ref 0.5–0.9)
EKG ATRIAL RATE: 80 BPM
EKG P AXIS: 66 DEGREES
EKG P-R INTERVAL: 156 MS
EKG Q-T INTERVAL: 390 MS
EKG QRS DURATION: 88 MS
EKG QTC CALCULATION (BAZETT): 449 MS
EKG R AXIS: 57 DEGREES
EKG T AXIS: 49 DEGREES
EKG VENTRICULAR RATE: 80 BPM
GFR AFRICAN AMERICAN: >60
GFR NON-AFRICAN AMERICAN: >60
GLUCOSE BLD-MCNC: 107 MG/DL (ref 70–99)
GLUCOSE URINE: NEGATIVE MG/DL
HCT VFR BLD CALC: 39.2 % (ref 37–47)
HEMOGLOBIN: 12.7 G/DL (ref 12–16)
KETONES, URINE: ABNORMAL MG/DL
LEUKOCYTE ESTERASE, URINE: NEGATIVE
MCH RBC QN AUTO: 28.4 PG (ref 27–31.3)
MCHC RBC AUTO-ENTMCNC: 32.5 % (ref 33–37)
MCV RBC AUTO: 87.4 FL (ref 82–100)
NITRITE, URINE: NEGATIVE
PDW BLD-RTO: 13.8 % (ref 11.5–14.5)
PH UA: 5.5 (ref 5–9)
PLATELET # BLD: 211 K/UL (ref 130–400)
POTASSIUM SERPL-SCNC: 3.7 MEQ/L (ref 3.4–4.9)
PROTEIN UA: NEGATIVE MG/DL
RBC # BLD: 4.48 M/UL (ref 4.2–5.4)
SODIUM BLD-SCNC: 142 MEQ/L (ref 135–144)
SPECIFIC GRAVITY UA: 1.02 (ref 1–1.03)
URINE REFLEX TO CULTURE: ABNORMAL
UROBILINOGEN, URINE: 0.2 E.U./DL
WBC # BLD: 6.6 K/UL (ref 4.8–10.8)

## 2021-02-12 PROCEDURE — 86901 BLOOD TYPING SEROLOGIC RH(D): CPT

## 2021-02-12 PROCEDURE — 93005 ELECTROCARDIOGRAM TRACING: CPT | Performed by: ORTHOPAEDIC SURGERY

## 2021-02-12 PROCEDURE — 85027 COMPLETE CBC AUTOMATED: CPT

## 2021-02-12 PROCEDURE — 80048 BASIC METABOLIC PNL TOTAL CA: CPT

## 2021-02-12 PROCEDURE — 86900 BLOOD TYPING SEROLOGIC ABO: CPT

## 2021-02-12 PROCEDURE — 86850 RBC ANTIBODY SCREEN: CPT

## 2021-02-12 PROCEDURE — 81003 URINALYSIS AUTO W/O SCOPE: CPT

## 2021-02-12 RX ORDER — LIDOCAINE HYDROCHLORIDE 10 MG/ML
1 INJECTION, SOLUTION EPIDURAL; INFILTRATION; INTRACAUDAL; PERINEURAL
Status: CANCELLED | OUTPATIENT
Start: 2021-02-19 | End: 2021-02-19

## 2021-02-12 RX ORDER — SODIUM CHLORIDE 0.9 % (FLUSH) 0.9 %
10 SYRINGE (ML) INJECTION EVERY 12 HOURS SCHEDULED
Status: CANCELLED | OUTPATIENT
Start: 2021-02-19

## 2021-02-12 RX ORDER — CEFAZOLIN SODIUM 2 G/50ML
2000 SOLUTION INTRAVENOUS ONCE
Status: CANCELLED | OUTPATIENT
Start: 2021-02-19

## 2021-02-12 RX ORDER — SODIUM CHLORIDE, SODIUM LACTATE, POTASSIUM CHLORIDE, CALCIUM CHLORIDE 600; 310; 30; 20 MG/100ML; MG/100ML; MG/100ML; MG/100ML
INJECTION, SOLUTION INTRAVENOUS CONTINUOUS
Status: CANCELLED | OUTPATIENT
Start: 2021-02-19

## 2021-02-12 RX ORDER — SODIUM CHLORIDE 0.9 % (FLUSH) 0.9 %
10 SYRINGE (ML) INJECTION PRN
Status: CANCELLED | OUTPATIENT
Start: 2021-02-19

## 2021-02-12 SDOH — HEALTH STABILITY: MENTAL HEALTH: HOW OFTEN DO YOU HAVE A DRINK CONTAINING ALCOHOL?: NEVER

## 2021-02-13 LAB
ABO/RH: NORMAL
ANTIBODY SCREEN: NORMAL
SARS-COV-2: NOT DETECTED
SOURCE: NORMAL

## 2021-02-15 PROCEDURE — 93010 ELECTROCARDIOGRAM REPORT: CPT | Performed by: INTERNAL MEDICINE

## 2021-02-18 ENCOUNTER — ANESTHESIA EVENT (OUTPATIENT)
Dept: OPERATING ROOM | Age: 66
End: 2021-02-18
Payer: MEDICARE

## 2021-02-18 NOTE — H&P
[address]                      [address], [address] [facility]                              HISTORY AND PHYSICAL    PATIENT NAME: Neftali Acevedo                   :        1955  MED REC NO:   [id number]                            ROOM:  ACCOUNT NO:   [de-identified]                           ADMIT DATE: 2021  PROVIDER:     Tisha Ellison PA-C    DATE OF SERVICE:  2021. CHIEF COMPLAINT:  Right knee pain. HISTORY OF PRESENT ILLNESS:  The patient with known right knee  osteoarthritis. She has treated it conservatively over the last several  years. Diagnostic studies show severe degenerative joint disease. After risks, benefits, and alternatives were discussed, she elected to  proceed with right total knee replacement. This will be performed on  2021 at [facility] in [facility]. PAST MEDICAL HISTORY:  Significant for osteoarthritis, hypertension,  elevated cholesterol, diabetes, hypothyroid, and seasonal allergies. She has a past medical history of breast cancer. Anxiety and panic  attacks. CURRENT MEDICATIONS:  Synthroid, sertraline, metformin, ibuprofen,  atorvastatin, and amlodipine. ALLERGIES:  No known drug allergies. PAST SURGICAL HISTORY:  Significant for two previous  section. Did state that she had nauseousness afterwards. SOCIAL HISTORY:  The patient denies current substance abuse. She does  report one or two alcoholic beverages per year. Admits to current  smoker. FAMILY HISTORY:  Significant for osteoarthritis. REVIEW OF SYSTEMS:  Noncontributory. PHYSICAL EXAMINATION:  GENERAL:  A 41-year-old  female. Height 5 feet 4 inches and  weight 200 pounds. HEENT:  Eyes:  Pupils equal, round, and reactive to light and  accommodation. Extraocular eye movements are intact. She does wear  corrective lenses. CHEST:  Lungs are clear to auscultation bilaterally.   CARDIAC:  Regular rate and

## 2021-02-19 ENCOUNTER — ANESTHESIA (OUTPATIENT)
Dept: OPERATING ROOM | Age: 66
End: 2021-02-19
Payer: MEDICARE

## 2021-02-19 ENCOUNTER — HOSPITAL ENCOUNTER (OUTPATIENT)
Age: 66
Setting detail: OBSERVATION
Discharge: HOME HEALTH | End: 2021-02-20
Attending: ORTHOPAEDIC SURGERY | Admitting: ORTHOPAEDIC SURGERY
Payer: MEDICARE

## 2021-02-19 ENCOUNTER — APPOINTMENT (OUTPATIENT)
Dept: GENERAL RADIOLOGY | Age: 66
End: 2021-02-19
Attending: ORTHOPAEDIC SURGERY
Payer: MEDICARE

## 2021-02-19 VITALS — OXYGEN SATURATION: 99 % | SYSTOLIC BLOOD PRESSURE: 95 MMHG | DIASTOLIC BLOOD PRESSURE: 47 MMHG

## 2021-02-19 DIAGNOSIS — M62.838 MUSCLE SPASM: ICD-10-CM

## 2021-02-19 DIAGNOSIS — G89.18 POST-OP PAIN: Primary | ICD-10-CM

## 2021-02-19 PROBLEM — Z96.651 STATUS POST TOTAL KNEE REPLACEMENT, RIGHT: Status: ACTIVE | Noted: 2021-02-19

## 2021-02-19 LAB
GLUCOSE BLD-MCNC: 118 MG/DL (ref 60–115)
GLUCOSE BLD-MCNC: 120 MG/DL (ref 60–115)
GLUCOSE BLD-MCNC: 120 MG/DL (ref 60–115)
GLUCOSE BLD-MCNC: 160 MG/DL (ref 60–115)
GLUCOSE BLD-MCNC: 161 MG/DL (ref 60–115)
PERFORMED ON: ABNORMAL

## 2021-02-19 PROCEDURE — 6370000000 HC RX 637 (ALT 250 FOR IP): Performed by: NURSE PRACTITIONER

## 2021-02-19 PROCEDURE — 7100000000 HC PACU RECOVERY - FIRST 15 MIN: Performed by: ORTHOPAEDIC SURGERY

## 2021-02-19 PROCEDURE — C1776 JOINT DEVICE (IMPLANTABLE): HCPCS | Performed by: ORTHOPAEDIC SURGERY

## 2021-02-19 PROCEDURE — 2580000003 HC RX 258: Performed by: ORTHOPAEDIC SURGERY

## 2021-02-19 PROCEDURE — C1713 ANCHOR/SCREW BN/BN,TIS/BN: HCPCS | Performed by: ORTHOPAEDIC SURGERY

## 2021-02-19 PROCEDURE — G0378 HOSPITAL OBSERVATION PER HR: HCPCS

## 2021-02-19 PROCEDURE — 97162 PT EVAL MOD COMPLEX 30 MIN: CPT

## 2021-02-19 PROCEDURE — 97166 OT EVAL MOD COMPLEX 45 MIN: CPT

## 2021-02-19 PROCEDURE — 3600000014 HC SURGERY LEVEL 4 ADDTL 15MIN: Performed by: ORTHOPAEDIC SURGERY

## 2021-02-19 PROCEDURE — 64447 NJX AA&/STRD FEMORAL NRV IMG: CPT | Performed by: ANESTHESIOLOGY

## 2021-02-19 PROCEDURE — 94150 VITAL CAPACITY TEST: CPT

## 2021-02-19 PROCEDURE — 6360000002 HC RX W HCPCS: Performed by: NURSE PRACTITIONER

## 2021-02-19 PROCEDURE — 2580000003 HC RX 258: Performed by: NURSE PRACTITIONER

## 2021-02-19 PROCEDURE — 7100000001 HC PACU RECOVERY - ADDTL 15 MIN: Performed by: ORTHOPAEDIC SURGERY

## 2021-02-19 PROCEDURE — 6370000000 HC RX 637 (ALT 250 FOR IP): Performed by: ORTHOPAEDIC SURGERY

## 2021-02-19 PROCEDURE — 6360000002 HC RX W HCPCS: Performed by: ANESTHESIOLOGY

## 2021-02-19 PROCEDURE — 73560 X-RAY EXAM OF KNEE 1 OR 2: CPT

## 2021-02-19 PROCEDURE — 6360000002 HC RX W HCPCS: Performed by: ORTHOPAEDIC SURGERY

## 2021-02-19 PROCEDURE — 3700000000 HC ANESTHESIA ATTENDED CARE: Performed by: ORTHOPAEDIC SURGERY

## 2021-02-19 PROCEDURE — 3600000004 HC SURGERY LEVEL 4 BASE: Performed by: ORTHOPAEDIC SURGERY

## 2021-02-19 PROCEDURE — 3700000001 HC ADD 15 MINUTES (ANESTHESIA): Performed by: ORTHOPAEDIC SURGERY

## 2021-02-19 PROCEDURE — 2709999900 HC NON-CHARGEABLE SUPPLY: Performed by: ORTHOPAEDIC SURGERY

## 2021-02-19 DEVICE — COMPONENT PAT DIA29MM THK8MM KNEE POLY CEM CONVENTIONAL: Type: IMPLANTABLE DEVICE | Site: KNEE | Status: FUNCTIONAL

## 2021-02-19 DEVICE — CEMENT BNE 40GM HI VISC RADPQ FOR REV SURG: Type: IMPLANTABLE DEVICE | Site: KNEE | Status: FUNCTIONAL

## 2021-02-19 DEVICE — COMPONENT FEM SZ 7 R KNEE CO CHROM NAR POST STBL CEM: Type: IMPLANTABLE DEVICE | Site: KNEE | Status: FUNCTIONAL

## 2021-02-19 DEVICE — SYSTEM TOT KNEE CEM FEM TIB COMP STD TIB INSRT STD PAT: Type: IMPLANTABLE DEVICE | Site: KNEE | Status: FUNCTIONAL

## 2021-02-19 DEVICE — COMPONENT ARTC SURF PS 6-9 EF 10 MM RT TIB KNEE POLYETH: Type: IMPLANTABLE DEVICE | Site: KNEE | Status: FUNCTIONAL

## 2021-02-19 DEVICE — IMPLANTABLE DEVICE: Type: IMPLANTABLE DEVICE | Site: KNEE | Status: FUNCTIONAL

## 2021-02-19 RX ORDER — SODIUM CHLORIDE 0.9 % (FLUSH) 0.9 %
10 SYRINGE (ML) INJECTION EVERY 12 HOURS SCHEDULED
Status: DISCONTINUED | OUTPATIENT
Start: 2021-02-19 | End: 2021-02-20 | Stop reason: HOSPADM

## 2021-02-19 RX ORDER — SODIUM CHLORIDE, SODIUM LACTATE, POTASSIUM CHLORIDE, CALCIUM CHLORIDE 600; 310; 30; 20 MG/100ML; MG/100ML; MG/100ML; MG/100ML
INJECTION, SOLUTION INTRAVENOUS CONTINUOUS
Status: DISPENSED | OUTPATIENT
Start: 2021-02-19 | End: 2021-02-20

## 2021-02-19 RX ORDER — CELECOXIB 200 MG/1
200 CAPSULE ORAL ONCE
Status: COMPLETED | OUTPATIENT
Start: 2021-02-19 | End: 2021-02-19

## 2021-02-19 RX ORDER — OXYCODONE HYDROCHLORIDE 5 MG/1
10 TABLET ORAL EVERY 4 HOURS PRN
Status: DISCONTINUED | OUTPATIENT
Start: 2021-02-19 | End: 2021-02-20 | Stop reason: HOSPADM

## 2021-02-19 RX ORDER — ONDANSETRON 2 MG/ML
4 INJECTION INTRAMUSCULAR; INTRAVENOUS EVERY 6 HOURS PRN
Status: DISCONTINUED | OUTPATIENT
Start: 2021-02-19 | End: 2021-02-20 | Stop reason: HOSPADM

## 2021-02-19 RX ORDER — MEPERIDINE HYDROCHLORIDE 25 MG/ML
12.5 INJECTION INTRAMUSCULAR; INTRAVENOUS; SUBCUTANEOUS EVERY 5 MIN PRN
Status: DISCONTINUED | OUTPATIENT
Start: 2021-02-19 | End: 2021-02-19 | Stop reason: HOSPADM

## 2021-02-19 RX ORDER — ASPIRIN 81 MG/1
81 TABLET ORAL 2 TIMES DAILY
Status: DISCONTINUED | OUTPATIENT
Start: 2021-02-19 | End: 2021-02-20 | Stop reason: HOSPADM

## 2021-02-19 RX ORDER — NICOTINE POLACRILEX 4 MG
15 LOZENGE BUCCAL PRN
Status: DISCONTINUED | OUTPATIENT
Start: 2021-02-19 | End: 2021-02-20 | Stop reason: HOSPADM

## 2021-02-19 RX ORDER — ATORVASTATIN CALCIUM 10 MG/1
10 TABLET, FILM COATED ORAL NIGHTLY
Status: DISCONTINUED | OUTPATIENT
Start: 2021-02-19 | End: 2021-02-20 | Stop reason: HOSPADM

## 2021-02-19 RX ORDER — SENNA AND DOCUSATE SODIUM 50; 8.6 MG/1; MG/1
1 TABLET, FILM COATED ORAL 2 TIMES DAILY
Status: DISCONTINUED | OUTPATIENT
Start: 2021-02-19 | End: 2021-02-20 | Stop reason: HOSPADM

## 2021-02-19 RX ORDER — SODIUM CHLORIDE 0.9 % (FLUSH) 0.9 %
10 SYRINGE (ML) INJECTION PRN
Status: DISCONTINUED | OUTPATIENT
Start: 2021-02-19 | End: 2021-02-20 | Stop reason: HOSPADM

## 2021-02-19 RX ORDER — ASPIRIN 81 MG/1
81 TABLET ORAL 2 TIMES DAILY
Qty: 60 TABLET | Refills: 0 | Status: SHIPPED | OUTPATIENT
Start: 2021-02-19 | End: 2021-08-31

## 2021-02-19 RX ORDER — SODIUM CHLORIDE 0.9 % (FLUSH) 0.9 %
10 SYRINGE (ML) INJECTION PRN
Status: DISCONTINUED | OUTPATIENT
Start: 2021-02-19 | End: 2021-02-19 | Stop reason: HOSPADM

## 2021-02-19 RX ORDER — TRANEXAMIC ACID 650 1/1
1950 TABLET ORAL ONCE
Status: COMPLETED | OUTPATIENT
Start: 2021-02-19 | End: 2021-02-19

## 2021-02-19 RX ORDER — SERTRALINE HYDROCHLORIDE 100 MG/1
100 TABLET, FILM COATED ORAL DAILY
Status: DISCONTINUED | OUTPATIENT
Start: 2021-02-19 | End: 2021-02-20 | Stop reason: HOSPADM

## 2021-02-19 RX ORDER — ONDANSETRON 4 MG/1
4 TABLET, ORALLY DISINTEGRATING ORAL EVERY 8 HOURS PRN
Status: DISCONTINUED | OUTPATIENT
Start: 2021-02-19 | End: 2021-02-20 | Stop reason: HOSPADM

## 2021-02-19 RX ORDER — PROPOFOL 10 MG/ML
INJECTION, EMULSION INTRAVENOUS CONTINUOUS PRN
Status: DISCONTINUED | OUTPATIENT
Start: 2021-02-19 | End: 2021-02-19 | Stop reason: SDUPTHER

## 2021-02-19 RX ORDER — DIAZEPAM 5 MG/1
5 TABLET ORAL EVERY 6 HOURS PRN
Status: DISCONTINUED | OUTPATIENT
Start: 2021-02-19 | End: 2021-02-20 | Stop reason: HOSPADM

## 2021-02-19 RX ORDER — ROPIVACAINE HYDROCHLORIDE 5 MG/ML
INJECTION, SOLUTION EPIDURAL; INFILTRATION; PERINEURAL PRN
Status: DISCONTINUED | OUTPATIENT
Start: 2021-02-19 | End: 2021-02-19 | Stop reason: SDUPTHER

## 2021-02-19 RX ORDER — HYDROCODONE BITARTRATE AND ACETAMINOPHEN 5; 325 MG/1; MG/1
2 TABLET ORAL PRN
Status: DISCONTINUED | OUTPATIENT
Start: 2021-02-19 | End: 2021-02-19 | Stop reason: HOSPADM

## 2021-02-19 RX ORDER — ACETAMINOPHEN 500 MG
1000 TABLET ORAL ONCE
Status: COMPLETED | OUTPATIENT
Start: 2021-02-19 | End: 2021-02-19

## 2021-02-19 RX ORDER — CEFAZOLIN SODIUM 2 G/50ML
2000 SOLUTION INTRAVENOUS ONCE
Status: COMPLETED | OUTPATIENT
Start: 2021-02-19 | End: 2021-02-19

## 2021-02-19 RX ORDER — SODIUM CHLORIDE, SODIUM LACTATE, POTASSIUM CHLORIDE, CALCIUM CHLORIDE 600; 310; 30; 20 MG/100ML; MG/100ML; MG/100ML; MG/100ML
INJECTION, SOLUTION INTRAVENOUS CONTINUOUS
Status: DISCONTINUED | OUTPATIENT
Start: 2021-02-19 | End: 2021-02-19

## 2021-02-19 RX ORDER — TRANEXAMIC ACID 650 1/1
1950 TABLET ORAL ONCE
Status: COMPLETED | OUTPATIENT
Start: 2021-02-19 | End: 2021-02-20

## 2021-02-19 RX ORDER — DEXTROSE MONOHYDRATE 50 MG/ML
100 INJECTION, SOLUTION INTRAVENOUS PRN
Status: DISCONTINUED | OUTPATIENT
Start: 2021-02-19 | End: 2021-02-20 | Stop reason: HOSPADM

## 2021-02-19 RX ORDER — ACETAMINOPHEN 325 MG/1
650 TABLET ORAL EVERY 6 HOURS
Status: DISCONTINUED | OUTPATIENT
Start: 2021-02-19 | End: 2021-02-20 | Stop reason: HOSPADM

## 2021-02-19 RX ORDER — SODIUM CHLORIDE 0.9 % (FLUSH) 0.9 %
10 SYRINGE (ML) INJECTION EVERY 12 HOURS SCHEDULED
Status: DISCONTINUED | OUTPATIENT
Start: 2021-02-19 | End: 2021-02-19 | Stop reason: HOSPADM

## 2021-02-19 RX ORDER — MAGNESIUM HYDROXIDE 1200 MG/15ML
LIQUID ORAL PRN
Status: DISCONTINUED | OUTPATIENT
Start: 2021-02-19 | End: 2021-02-19 | Stop reason: ALTCHOICE

## 2021-02-19 RX ORDER — METOCLOPRAMIDE HYDROCHLORIDE 5 MG/ML
10 INJECTION INTRAMUSCULAR; INTRAVENOUS
Status: DISCONTINUED | OUTPATIENT
Start: 2021-02-19 | End: 2021-02-19 | Stop reason: HOSPADM

## 2021-02-19 RX ORDER — MAGNESIUM HYDROXIDE 1200 MG/15ML
LIQUID ORAL CONTINUOUS PRN
Status: COMPLETED | OUTPATIENT
Start: 2021-02-19 | End: 2021-02-19

## 2021-02-19 RX ORDER — PROPOFOL 10 MG/ML
INJECTION, EMULSION INTRAVENOUS PRN
Status: DISCONTINUED | OUTPATIENT
Start: 2021-02-19 | End: 2021-02-19 | Stop reason: SDUPTHER

## 2021-02-19 RX ORDER — AMLODIPINE BESYLATE 10 MG/1
10 TABLET ORAL DAILY
Status: DISCONTINUED | OUTPATIENT
Start: 2021-02-19 | End: 2021-02-20 | Stop reason: HOSPADM

## 2021-02-19 RX ORDER — KETOROLAC TROMETHAMINE 15 MG/ML
15 INJECTION, SOLUTION INTRAMUSCULAR; INTRAVENOUS EVERY 6 HOURS PRN
Status: DISPENSED | OUTPATIENT
Start: 2021-02-19 | End: 2021-02-19

## 2021-02-19 RX ORDER — FENTANYL CITRATE 50 UG/ML
INJECTION, SOLUTION INTRAMUSCULAR; INTRAVENOUS PRN
Status: DISCONTINUED | OUTPATIENT
Start: 2021-02-19 | End: 2021-02-19 | Stop reason: SDUPTHER

## 2021-02-19 RX ORDER — DIAZEPAM 5 MG/1
5 TABLET ORAL EVERY 6 HOURS PRN
Qty: 20 TABLET | Refills: 0 | Status: SHIPPED | OUTPATIENT
Start: 2021-02-19 | End: 2021-02-24

## 2021-02-19 RX ORDER — HYDROCODONE BITARTRATE AND ACETAMINOPHEN 5; 325 MG/1; MG/1
1 TABLET ORAL PRN
Status: DISCONTINUED | OUTPATIENT
Start: 2021-02-19 | End: 2021-02-19 | Stop reason: HOSPADM

## 2021-02-19 RX ORDER — ONDANSETRON 2 MG/ML
4 INJECTION INTRAMUSCULAR; INTRAVENOUS
Status: DISCONTINUED | OUTPATIENT
Start: 2021-02-19 | End: 2021-02-19 | Stop reason: HOSPADM

## 2021-02-19 RX ORDER — MIDAZOLAM HYDROCHLORIDE 1 MG/ML
INJECTION INTRAMUSCULAR; INTRAVENOUS PRN
Status: DISCONTINUED | OUTPATIENT
Start: 2021-02-19 | End: 2021-02-19 | Stop reason: SDUPTHER

## 2021-02-19 RX ORDER — DIPHENHYDRAMINE HYDROCHLORIDE 50 MG/ML
12.5 INJECTION INTRAMUSCULAR; INTRAVENOUS
Status: DISCONTINUED | OUTPATIENT
Start: 2021-02-19 | End: 2021-02-19 | Stop reason: HOSPADM

## 2021-02-19 RX ORDER — ONDANSETRON 2 MG/ML
INJECTION INTRAMUSCULAR; INTRAVENOUS PRN
Status: DISCONTINUED | OUTPATIENT
Start: 2021-02-19 | End: 2021-02-19 | Stop reason: SDUPTHER

## 2021-02-19 RX ORDER — LEVOTHYROXINE SODIUM 0.1 MG/1
100 TABLET ORAL DAILY
Status: DISCONTINUED | OUTPATIENT
Start: 2021-02-20 | End: 2021-02-20 | Stop reason: HOSPADM

## 2021-02-19 RX ORDER — DEXTROSE MONOHYDRATE 25 G/50ML
12.5 INJECTION, SOLUTION INTRAVENOUS PRN
Status: DISCONTINUED | OUTPATIENT
Start: 2021-02-19 | End: 2021-02-20 | Stop reason: HOSPADM

## 2021-02-19 RX ORDER — OXYCODONE HYDROCHLORIDE 5 MG/1
5 TABLET ORAL EVERY 4 HOURS PRN
Status: DISCONTINUED | OUTPATIENT
Start: 2021-02-19 | End: 2021-02-20 | Stop reason: HOSPADM

## 2021-02-19 RX ORDER — LIDOCAINE HYDROCHLORIDE 10 MG/ML
1 INJECTION, SOLUTION EPIDURAL; INFILTRATION; INTRACAUDAL; PERINEURAL
Status: DISCONTINUED | OUTPATIENT
Start: 2021-02-19 | End: 2021-02-19 | Stop reason: HOSPADM

## 2021-02-19 RX ORDER — OXYCODONE HYDROCHLORIDE 5 MG/1
5 TABLET ORAL EVERY 6 HOURS PRN
Qty: 28 TABLET | Refills: 0 | Status: SHIPPED | OUTPATIENT
Start: 2021-02-19 | End: 2021-02-26

## 2021-02-19 RX ORDER — CEFAZOLIN SODIUM 2 G/50ML
2000 SOLUTION INTRAVENOUS EVERY 8 HOURS
Status: COMPLETED | OUTPATIENT
Start: 2021-02-19 | End: 2021-02-19

## 2021-02-19 RX ORDER — MORPHINE SULFATE 2 MG/ML
2 INJECTION, SOLUTION INTRAMUSCULAR; INTRAVENOUS
Status: DISCONTINUED | OUTPATIENT
Start: 2021-02-19 | End: 2021-02-20 | Stop reason: HOSPADM

## 2021-02-19 RX ORDER — FENTANYL CITRATE 50 UG/ML
50 INJECTION, SOLUTION INTRAMUSCULAR; INTRAVENOUS EVERY 10 MIN PRN
Status: DISCONTINUED | OUTPATIENT
Start: 2021-02-19 | End: 2021-02-19 | Stop reason: HOSPADM

## 2021-02-19 RX ORDER — OXYCODONE HCL 10 MG/1
10 TABLET, FILM COATED, EXTENDED RELEASE ORAL ONCE
Status: COMPLETED | OUTPATIENT
Start: 2021-02-19 | End: 2021-02-19

## 2021-02-19 RX ADMIN — CELECOXIB 200 MG: 200 CAPSULE ORAL at 06:16

## 2021-02-19 RX ADMIN — ASPIRIN 81 MG: 81 TABLET, COATED ORAL at 21:06

## 2021-02-19 RX ADMIN — MIDAZOLAM HYDROCHLORIDE 2 MG: 2 INJECTION, SOLUTION INTRAMUSCULAR; INTRAVENOUS at 07:05

## 2021-02-19 RX ADMIN — OXYCODONE HYDROCHLORIDE 10 MG: 10 TABLET, FILM COATED, EXTENDED RELEASE ORAL at 06:16

## 2021-02-19 RX ADMIN — TRANEXAMIC ACID 1950 MG: 650 TABLET ORAL at 16:13

## 2021-02-19 RX ADMIN — PHENYLEPHRINE HYDROCHLORIDE 200 MCG: 10 INJECTION INTRAVENOUS at 08:03

## 2021-02-19 RX ADMIN — AMLODIPINE BESYLATE 10 MG: 10 TABLET ORAL at 16:14

## 2021-02-19 RX ADMIN — SERTRALINE 100 MG: 100 TABLET, FILM COATED ORAL at 16:14

## 2021-02-19 RX ADMIN — SODIUM CHLORIDE, POTASSIUM CHLORIDE, SODIUM LACTATE AND CALCIUM CHLORIDE: 600; 310; 30; 20 INJECTION, SOLUTION INTRAVENOUS at 10:04

## 2021-02-19 RX ADMIN — SODIUM CHLORIDE, POTASSIUM CHLORIDE, SODIUM LACTATE AND CALCIUM CHLORIDE: 600; 310; 30; 20 INJECTION, SOLUTION INTRAVENOUS at 11:59

## 2021-02-19 RX ADMIN — PROPOFOL 40 MG: 10 INJECTION, EMULSION INTRAVENOUS at 07:41

## 2021-02-19 RX ADMIN — CEFAZOLIN SODIUM 2000 MG: 2 SOLUTION INTRAVENOUS at 22:51

## 2021-02-19 RX ADMIN — ACETAMINOPHEN 1000 MG: 500 TABLET ORAL at 06:16

## 2021-02-19 RX ADMIN — ROPIVACAINE HYDROCHLORIDE 30 ML: 5 INJECTION, SOLUTION EPIDURAL; INFILTRATION; PERINEURAL at 07:08

## 2021-02-19 RX ADMIN — ONDANSETRON 4 MG: 2 INJECTION INTRAMUSCULAR; INTRAVENOUS at 07:44

## 2021-02-19 RX ADMIN — TRANEXAMIC ACID 1950 MG: 650 TABLET ORAL at 06:17

## 2021-02-19 RX ADMIN — PHENYLEPHRINE HYDROCHLORIDE 200 MCG: 10 INJECTION INTRAVENOUS at 07:50

## 2021-02-19 RX ADMIN — ACETAMINOPHEN 650 MG: 325 TABLET ORAL at 17:37

## 2021-02-19 RX ADMIN — KETOROLAC TROMETHAMINE 15 MG: 15 INJECTION, SOLUTION INTRAMUSCULAR; INTRAVENOUS at 21:06

## 2021-02-19 RX ADMIN — CEFAZOLIN SODIUM 2000 MG: 2 SOLUTION INTRAVENOUS at 16:14

## 2021-02-19 RX ADMIN — SODIUM CHLORIDE, POTASSIUM CHLORIDE, SODIUM LACTATE AND CALCIUM CHLORIDE: 600; 310; 30; 20 INJECTION, SOLUTION INTRAVENOUS at 07:47

## 2021-02-19 RX ADMIN — FENTANYL CITRATE 100 MCG: 50 INJECTION, SOLUTION INTRAMUSCULAR; INTRAVENOUS at 07:05

## 2021-02-19 RX ADMIN — DOCUSATE SODIUM 50 MG AND SENNOSIDES 8.6 MG 1 TABLET: 8.6; 5 TABLET, FILM COATED ORAL at 11:50

## 2021-02-19 RX ADMIN — ASPIRIN 81 MG: 81 TABLET, COATED ORAL at 11:50

## 2021-02-19 RX ADMIN — DOCUSATE SODIUM 50 MG AND SENNOSIDES 8.6 MG 1 TABLET: 8.6; 5 TABLET, FILM COATED ORAL at 21:06

## 2021-02-19 RX ADMIN — OXYCODONE 10 MG: 5 TABLET ORAL at 21:23

## 2021-02-19 RX ADMIN — ATORVASTATIN CALCIUM 10 MG: 10 TABLET, FILM COATED ORAL at 21:06

## 2021-02-19 RX ADMIN — CEFAZOLIN SODIUM 2000 MG: 2 SOLUTION INTRAVENOUS at 07:37

## 2021-02-19 RX ADMIN — PROPOFOL 100 MCG/KG/MIN: 10 INJECTION, EMULSION INTRAVENOUS at 07:41

## 2021-02-19 RX ADMIN — ACETAMINOPHEN 650 MG: 325 TABLET ORAL at 11:50

## 2021-02-19 RX ADMIN — DIAZEPAM 5 MG: 5 TABLET ORAL at 11:50

## 2021-02-19 RX ADMIN — PHENYLEPHRINE HYDROCHLORIDE 200 MCG: 10 INJECTION INTRAVENOUS at 07:59

## 2021-02-19 RX ADMIN — ACETAMINOPHEN 650 MG: 325 TABLET ORAL at 22:52

## 2021-02-19 ASSESSMENT — PULMONARY FUNCTION TESTS
PIF_VALUE: 1
PIF_VALUE: 0
PIF_VALUE: 1
PIF_VALUE: 0
PIF_VALUE: 1

## 2021-02-19 ASSESSMENT — PAIN SCALES - GENERAL
PAINLEVEL_OUTOF10: 2
PAINLEVEL_OUTOF10: 2

## 2021-02-19 ASSESSMENT — PAIN DESCRIPTION - LOCATION: LOCATION: KNEE

## 2021-02-19 ASSESSMENT — PAIN DESCRIPTION - PAIN TYPE
TYPE: SURGICAL PAIN
TYPE: SURGICAL PAIN

## 2021-02-19 NOTE — ANESTHESIA POSTPROCEDURE EVALUATION
Department of Anesthesiology  Postprocedure Note    Patient: Ricky Smith  MRN: [id number]  YOB: 1955  Date of evaluation: 2/19/2021  Time:  9:36 AM     Procedure Summary     Date: 02/19/21 Room / Location: [address]    Anesthesia Start: 4469 Anesthesia Stop:     Procedure: RIGHT TOTAL KNEE ARTHROPLASTY WITH TIBIAL STEMS SUPINE; CONSTANTIN PERSONA TIBIAL STEM CONSTANTIN-NARCISO (Right ) Diagnosis: (RIGHT DJD KNEE, VARUS DEFORMITY)    Surgeons: Mehran Corado MD Responsible Provider: Yamilet Evans MD    Anesthesia Type: spinal ASA Status: 3          Anesthesia Type: No value filed. Catracho Phase I: Catracho Score: 10    Catracho Phase II:      Last vitals: Reviewed and per EMR flowsheets.        Anesthesia Post Evaluation    Patient location during evaluation: PACU  Patient participation: complete - patient participated  Level of consciousness: awake  Pain score: 0  Airway patency: patent  Nausea & Vomiting: no nausea  Complications: no  Cardiovascular status: hemodynamically stable  Respiratory status: acceptable  Hydration status: euvolemic

## 2021-02-19 NOTE — ANESTHESIA PROCEDURE NOTES
Peripheral Block    Patient location during procedure: pre-op  Staffing  Performed: anesthesiologist   Anesthesiologist: Todd Matthews MD  Preanesthetic Checklist  Completed: patient identified, IV checked, site marked, risks and benefits discussed, surgical consent, monitors and equipment checked, pre-op evaluation, timeout performed, anesthesia consent given, oxygen available and patient being monitored  Peripheral Block  Patient position: supine  Prep: ChloraPrep  Patient monitoring: cardiac monitor, continuous pulse ox, frequent blood pressure checks and IV access  Block type: Saphenous  Laterality: right  Injection technique: single-shot  Guidance: ultrasound guided  Local infiltration: ropivacaine  Infiltration strength: 0.5 %  Dose: 30 mL  Provider prep: mask and sterile gloves  Local infiltration: ropivacaine  Needle  Needle type: combined needle/nerve stimulator   Needle gauge: 21 G  Needle length: 10 cm  Needle localization: ultrasound guidance  Test dose: negative  Assessment  Injection assessment: negative aspiration for heme, no paresthesia on injection and local visualized surrounding nerve on ultrasound  Paresthesia pain: none  Slow fractionated injection: yes  Hemodynamics: stable  Reason for block: post-op pain management

## 2021-02-19 NOTE — PROGRESS NOTES
[facility] OCCUPATIONAL THERAPY EVALUATION - ACUTE     NAME: Kerrie Peña  :  (67 y.o.)  MRN: [id number]  CODE STATUS: Full Code  Room: 25/N225-01    Date of Service: 2021    Patient Diagnosis(es): Status post total knee replacement, right [Z96.651]   No chief complaint on file. Patient Active Problem List    Diagnosis Date Noted    Status post total knee replacement, right 2021    Ataxic gait 2020    History of diabetes mellitus 09/15/2020    Knee pain 09/15/2020    Personal history of malignant neoplasm 09/15/2020    Primary osteoarthritis of both knees 09/15/2020    Shoulder joint pain 09/15/2020    Radicular pain in right arm 2020    Cervical radiculopathy 2020    Adhesive capsulitis of both shoulders 2017    Herniated nucleus pulposus, C5-6 2017    Herniated nucleus pulposus, C6-7 left 2017    Smoker 2017    Uncontrolled diabetes mellitus type 2 without complications     Obesity 2016    Vitamin B12 deficiency 2016    DM2 (diabetes mellitus, type 2) ([facility] [address] 2015    HTN (hypertension) 2015    Hypothyroid 2015    Hyperlipemia, mixed     Metabolic syndrome     Ingrowing nail 2009        Past Medical History:   Diagnosis Date    Cancer ([facility] [address]     Depression     Hyperlipidemia     Hypertension     Thyroid disease     Type II diabetes mellitus, uncontrolled ([facility] [address]      Past Surgical History:   Procedure Laterality Date    BREAST LUMPECTOMY Left 1996     SECTION  1981     SECTION          Restrictions  Restrictions/Precautions: Weight Bearing  Lower Extremity Weight Bearing Restrictions  Right Lower Extremity Weight Bearing: Weight Bearing As Tolerated     Safety Devices: Safety Devices  Safety Devices in place: Yes  Type of devices:  All fall risk precautions in place        Subjective  Pre Treatment Pain Screening  Pain at present: 0  Scale Used: Numeric Score    Pain Reassessment:   Pain Assessment  Patient Currently in Pain: Denies       Prior Level of Function:  Social/Functional History  Lives With: Son, Spouse  Type of Home: House  Home Layout: One level  Home Access: Stairs to enter with rails  Entrance Stairs - Number of Steps: 4 stairs to enter  Entrance Stairs - Rails: Both  Bathroom Shower/Tub: Tub/Shower unit  Bathroom Equipment: Grab bars in shower, Shower chair  ADL Assistance: [address] [place] [address] Avenue: Independent  Homemaking Responsibilities: Yes  Ambulation Assistance: Independent  Transfer Assistance: Independent  Active : Yes  Occupation: Unemployed    OBJECTIVE:     Orientation Status:  Orientation  Overall Orientation Status: Within Functional Limits    Observation:  Observation/Palpation  Posture: Good  Observation: Pt resting in bed upon entry. Pt pleasant and agreeable to OT evaluation. No acute distress noted. Knee immobilizer present when OOB. Cognition Status:  Cognition  Overall Cognitive Status: [facility]    Perception Status:  Perception  Overall Perceptual Status: [facility]    Sensation Status:  Sensation  Overall Sensation Status: [facility]    Vision and Hearing Status:  Vision  Vision: Within Functional Limits  Hearing  Hearing: Within functional limits     ROM:   LUE AROM (degrees)  LUE AROM : Exceptions  LUE General AROM: Pt states she is unable to flex shoulders to 180 d/t B shoulder pain. L Shoulder Flexion 0-180: 150  Left Hand AROM (degrees)  Left Hand AROM: WFL  RUE AROM (degrees)  RUE AROM : Exceptions  RUE General AROM: Pt states she is unable to flex shoulders to 180 d/t B shoulder pain. R Shoulder Flexion 0-180: 150    Strength:  LUE Strength  Gross LUE Strength: [facility]  L Hand General: 4+/5  LUE Strength Comment: 4+/5 all planes  RUE Strength  Gross RUE Strength: WFL  R Hand General: 4+/5  RUE Strength Comment: 4+/5 all planes    Coordination, Tone, Quality of Movement:    Tone RUE  RUE Tone: Normotonic  Tone LUE  LUE Tone: Normotonic  Coordination  Movements Are Fluid And Coordinated: Yes    Hand Dominance:  Hand Dominance  Hand Dominance: Right    ADL Status:  ADL  Feeding: Modified independent   Grooming: Contact guard assistance  UE Bathing: Minimal assistance  LE Bathing: Minimal assistance  UE Dressing: Supervision  LE Dressing: Moderate assistance  Toileting:  Moderate assistance  Additional Comments: ADLs simulated as above  Toilet Transfers  Toilet Transfer: Unable to assess  Toilet Transfers Comments: Anticipate min A based on other mobility       Therapy key for assistance levels -   Independent = Pt. is able to perform task with no assistance but may require a device   Stand by assistance = Pt. does not perform task at an independent level but does not need physical assistance, requires verbal cues  Minimal, Moderate, Maximal Assistance = Pt. requires physical assistance (25%, 50%, 75% assist from helper) for task but is able to actively participate in task   Dependent = Pt. requires total assistance with task and is not able to actively participate with task completion     Functional Mobility:  Functional Mobility  Functional - Mobility Device: Standard Walker  Activity: Other  Assist Level: Contact guard assistance  Transfers  Sit to stand: Minimal assistance  Stand to sit: Minimal assistance    Bed Mobility  Bed mobility  Rolling to Left: Modified independent  Supine to Sit: Minimal assistance  Sit to Supine: Unable to assess(Pt up in chair)    Seated and Standing Balance:  Balance  Sitting Balance: Independent  Standing Balance: Supervision    Functional Endurance:  Activity Tolerance  Activity Tolerance: Patient Tolerated treatment well    D/C Recommendations:  OT D/C RECOMMENDATIONS  REQUIRES OT FOLLOW UP: Yes    Equipment Recommendations:  OT Equipment Recommendations  Other: Continue to assess    OT Education:   OT Education  OT Education: OT Role, Plan of Care  Barriers to Learning: None    OT Follow Up:  OT D/C RECOMMENDATIONS  REQUIRES OT FOLLOW UP: Yes       Assessment/Discharge Disposition:  Assessment: Pt. is a 75 y/o woman from home who presents to [facility] with the above deficits which affect her ability to complete ADL/IADL tasks. Patient would benefit from continued OT to maximize independence and safety at home. Performance deficits / Impairments: Decreased functional mobility , Decreased ADL status, Decreased ROM, Decreased strength, Decreased endurance, Decreased balance, Decreased coordination  Prognosis: Good  Discharge Recommendations: Continue to assess pending progress  Decision Making: Medium Complexity  History: Patient with moderately complex medical history  Exam: 7 deficits  Assistance / Modification: Min-Mod A    Six Click Score   How much help for putting on and taking off regular lower body clothing?: A Lot  How much help for Bathing?: A Lot  How much help for Toileting?: A Little  How much help for putting on and taking off regular upper body clothing?: A Little  How much help for taking care of personal grooming?: A Little  How much help for eating meals?: A Little  AM-[facility] Inpatient Daily Activity Raw Score: 16  AM-[facility] Inpatient ADL T-Scale Score : 35.96  ADL Inpatient CMS 0-100% Score: 53.32    Plan:  Plan  Times per week: 1-3x/ weekly  Plan weeks: Length of acute stay  Current Treatment Recommendations: Strengthening, Safety Education & Training, Balance Training, Patient/Caregiver Education & Training, Self-Care / ADL, Home Management Training, Equipment Evaluation, Education, & procurement, Functional Mobility Training, Endurance Training    Goals:   Patient will:    - Improve functional endurance to tolerate/complete 30 mins of ADL's  - Be Ind in UB ADLs   - Be Mod I in LB ADLs  - Be Ind in ADL transfers without LOB  - Be Mod I in toileting tasks  - Access appropriate D/C site with as few architectural barriers as possible.   - Sequence self-care tasks with No verbal cues    Patient Goal: Patient goals :  To return home     Discussed and agreed upon: Yes Comments:     Therapy Time:   OT Individual Minutes  Time In: 1411  Time Out: 1438  Minutes: 27    Eval: 27 minutes   Electronically signed by Lisette Andrade OT on 2/19/2021 at 4:06 PM

## 2021-02-19 NOTE — CONSULTS
Consult Note    Reason for Consult:  HTN, thyroid disease, DM    Requesting Physician:  Roro Mejia MD    HISTORY OF PRESENT ILLNESS:    The patient is a 76 y.o. female who admitted post op for  R TKA for R DJD. Pmhx HTN, hpl, hypothyroidism, DM type II, breast Ca, anxiety and depression. Reports she does not usually check her glucose levels at home. Report she takes medications as ordered. Denies chest pain, SOB, N/V/D, denies abdominal pain. Past Medical History:   Diagnosis Date    Cancer [facility] [address]     Depression     Hyperlipidemia     Hypertension     Thyroid disease     Type II diabetes mellitus, uncontrolled ([facility] [address]        Past Surgical History:   Procedure Laterality Date    BREAST LUMPECTOMY Left      SECTION     [address]       Prior to Admission medications    Medication Sig Start Date End Date Taking?  Authorizing Provider   sertraline (ZOLOFT) 100 MG tablet TAKE 1 TABLET DAILY 21  Yes Javon Reynaga MD   amLODIPine (NORVASC) 10 MG tablet Once a day 20  Yes Gilbert Khan MD   levothyroxine (SYNTHROID) 100 MCG tablet Take 1 tablet by mouth Daily 20  Yes Gilbert Khan MD   metFORMIN (GLUCOPHAGE) 500 MG tablet TAKE 1 TABLET TWICE A DAY WITH MEALS 20  Yes Gilbert Khan MD   atorvastatin (LIPITOR) 10 MG tablet TAKE 1 TABLET DAILY 20  Yes Gilbert Khan MD   ibuprofen (ADVIL;MOTRIN) 800 MG tablet Take 1 tablet by mouth every 6 hours as needed for Pain 20  Javon Reynaga MD   blood glucose monitor strips Test BG once daily, DX: E11.9, NIDDM 18   Gilbert Khan MD   Lancets MISC Test BG once daily, DX: E11.65, NIDDM 18   Gilbert Khan MD   Syringe/Needle, Disp, (SYRINGE 3CC/25GX1\") 25G X 1\" 3 ML MISC Use for monthly B-12 injection 3/30/17   Gilbert Khan MD   Blood Glucose Monitoring Suppl MIGUEL ANGEL Test BG once daily, DX: E11.65, NIDDM 17   Gilbert Khan MD   Alcohol Swabs PADS Bid 16   Gilbert Khan MD       Scheduled Meds:  [facility] tranexamic acid  1,950 mg Oral Once    tranexamic acid  1,950 mg Oral Once    sodium chloride flush  10 mL Intravenous 2 times per day    acetaminophen  650 mg Oral Q6H    ceFAZolin (ANCEF) IVPB  2,000 mg Intravenous Q8H    sennosides-docusate sodium  1 tablet Oral BID    aspirin  81 mg Oral BID     Continuous Infusions:   lactated ringers 50 mL/hr at 02/19/21 1159     PRN Meds:sodium chloride flush, oxyCODONE **OR** oxyCODONE, morphine, ondansetron **OR** ondansetron, magnesium hydroxide, famotidine (PEPCID) injection, ketorolac, diazePAM    No Known Allergies    Social History     Socioeconomic History    Marital status:      Spouse name: Not on file    Number of children: Not on file    Years of education: Not on file    Highest education level: Not on file   Occupational History    Not on file   Social Needs    Financial resource strain: Not on file    Food insecurity     Worry: Not on file     Inability: Not on file    Transportation needs     Medical: Not on file     Non-medical: Not on file   Tobacco Use    Smoking status: Current Every Day Smoker     Types: Cigarettes    Smokeless tobacco: Never Used    Tobacco comment: trying to cut back   Substance and Sexual Activity    Alcohol use: Never     Alcohol/week: 0.0 standard drinks     Frequency: Never    Drug use: Not Currently     Types: Marijuana    Sexual activity: Not on file   Lifestyle    Physical activity     Days per week: Not on file     Minutes per session: Not on file    Stress: Not on file   Relationships    Social connections     Talks on phone: Not on file     Gets together: Not on file     Attends [employer] service: Not on file     Active member of club or organization: Not on file     Attends meetings of clubs or organizations: Not on file     Relationship status: Not on file    Intimate partner violence     Fear of current or ex partner: Not on file     Emotionally abused: Not on file     Physically abused: Not on file     Forced sexual activity: Not on file   Other Topics Concern    Not on file   Social History Narrative    Not on file       Family History   Problem Relation Age of Onset    Heart Disease Mother     Cancer Father        Review Of Systems:   CONSTITUTIONAL:  negative  EYES:  negative  HEENT:  negative  RESPIRATORY:  negative  CARDIOVASCULAR:  negative  GASTROINTESTINAL:  negative  MUSCULOSKELETAL:  R leg pain  NEUROLOGICAL:  negative  BEHAVIOR/PSYCH:  Anxiety/depression      Physical Exam:  Vitals:    02/19/21 1015 02/19/21 1039 02/19/21 1041 02/19/21 1046   BP: 122/69 123/69 130/61 122/64   Pulse: 75 65 67 61   Resp: 12 14 14 14   Temp:  97.2 °F (36.2 °C)     TempSrc:  Oral     SpO2: 96% 99% 96% 95%   Weight:       Height:           CONSTITUTIONAL:  awake, alert, cooperative, no apparent distress, and appears stated age  HEENT: Head atraumatic, normocephalic, Oral pharynx clear, nose normal, conjunctiva normal.   NECK:  Supple, symmetrical, trachea midline  CARDIOVASCULAR:  Normal apical impulse, regular rate and rhythm  ABDOMEN:  , normal bowel sounds, soft, non-distended, non-tender  MUSCULOSKELETAL: Decreased ROM RLE. RLE brace and wrap in place. NEUROLOGIC:  Awake, alert, oriented to name, place and time. SKIN:  Dry and warm      Labs:  Recent Results (from the past 24 hour(s))   POCT Glucose    Collection Time: 02/19/21  6:37 AM   Result Value Ref Range    POC Glucose 118 (H) 60 - 115 mg/dl    Performed on ACCU-CHEK    POCT Glucose    Collection Time: 02/19/21  9:37 AM   Result Value Ref Range    POC Glucose 120 (H) 60 - 115 mg/dl    Performed on ACCU-CHEK    POCT Glucose    Collection Time: 02/19/21 11:32 AM   Result Value Ref Range    POC Glucose 120 (H) 60 - 115 mg/dl    Performed on ACCU-CHEK      Assessment/Plan:  1. R knee DJD. S/p RTKA. Primary team managing, IVF, pain mananagement, ATbx  2.  DM type II: resume home meds, SSI, POCT glucose ACHS while inpatient, hypoglycemia protocol  3. HTN/hpl: stable, resume home meds  4. Hypothyroidism: home meds  5. Depression/anxiety: home meds  6.  DVT proph: primary team    Electronically signed by ALY Hairston NP on 2/19/21 at 1:12 PM EST

## 2021-02-19 NOTE — FLOWSHEET NOTE
1040- Patient arrived to floor, no pain at this time. Patient has sensation in BLE, +2 pedal pulse, and movement. Patient wanted oxygen removed. I removed oxygen, patient went to 88% on RA while resting, 3L oxygen reapplied at this time. 1200- Patient complaining of RLE just feeling uncomfortable, like she needs to bend it. Patient given valium at this time and repositioned for comfort.

## 2021-02-19 NOTE — PROGRESS NOTES
Physical Therapy Med Surg Initial Assessment  Fac[facility]/Department: [facility]  Room: N225/N225-       NAME: Danial Arreguin  : [id number] (62 y.o.)  MRN: [id number]  CODE STATUS: Full Code    Date of Service: 2021    Patient Diagnosis(es): Status post total knee replacement, right [Z96.651]   No chief complaint on file.     Patient Active Problem List    Diagnosis Date Noted    Status post total knee replacement, right 2021    Ataxic gait 2020    History of diabetes mellitus 09/15/2020    Knee pain 09/15/2020    Personal history of malignant neoplasm 09/15/2020    Primary osteoarthritis of both knees 09/15/2020    Shoulder joint pain 09/15/2020    Radicular pain in right arm 2020    Cervical radiculopathy 2020    Adhesive capsulitis of both shoulders 2017    Herniated nucleus pulposus, C5-6 2017    Herniated nucleus pulposus, C6-7 left 2017    Smoker 2017    Uncontrolled diabetes mellitus type 2 without complications     Obesity 2016    Vitamin B12 deficiency 2016    DM2 (diabetes mellitus, type 2) ([facility] [address] 2015    HTN (hypertension) 2015    Hypothyroid 2015    Hyperlipemia, mixed     Metabolic syndrome     Ingrowing nail 2009        Past Medical History:   Diagnosis Date    Cancer ([facility] [address]     Depression     Hyperlipidemia     Hypertension     Thyroid disease     Type II diabetes mellitus, uncontrolled ([facility] [address]      Past Surgical History:   Procedure Laterality Date    BREAST LUMPECTOMY Left 1996     SECTION     [address]       Patient assessed for rehabilitation services?: Yes    Restrictions:  Restrictions/Precautions: Weight Bearing  Lower Extremity Weight Bearing Restrictions  Right Lower Extremity Weight Bearing: Weight Bearing As Tolerated(knee immobilizer on when OOB until able to SLR)     SUBJECTIVE:      Pain       Pre and post Treatment Pain Screenin/10       Prior Level of Function:  Social/Functional History  Lives With: Son, Spouse  Type of Home: House  Home Layout: One level  Home Access: Stairs to enter with rails  Entrance Stairs - Number of Steps: 4 stairs to enter  Entrance Stairs - Rails: Both  Bathroom Shower/Tub: Tub/Shower unit  Bathroom Equipment: Grab bars in shower, Shower chair  ADL Assistance: [address]: Independent  Homemaking Responsibilities: Yes  Ambulation Assistance: Independent  Transfer Assistance: Independent  Active : Yes  Occupation: Unemployed    OBJECTIVE:   Vision: Within Functional Limits  Hearing: Within functional limits    Cognition:  Overall Orientation Status: Within Normal Limits         ROM:  RLE General PROM: knee NT - day of surgery  LLE PROM: WFL    Strength:  Strength RLE  Comment: 3+/5 SLR with immobilizer on  Strength LLE  Strength LLE: WFL    Neuro:  Balance  Sitting - Static: Good  Sitting - Dynamic: Good  Standing - Static: Poor(BUE support required to maintain sitting)  Standing - Dynamic: Poor(unable to ambulate without device - mildly unsteady with bilat UE support)             Bed mobility  Rolling to Left: Modified independent  Supine to Sit: Minimal assistance  Sit to Supine: Unable to assess(pt up in chair)    Transfers  Sit to Stand: Minimal Assistance(from bed and chair)  Stand to sit: Minimal Assistance  Bed to Chair: Minimal assistance(with ww)    Ambulation  Ambulation?: Yes  Ambulation 1  Surface: level tile  Device: Rolling Walker  Other Apparatus: Knee Immobilizer  Assistance: Minimal assistance  Quality of Gait: decreaased RLE stance time and weight acceptance, variable UE support required, improved quality with secondgait attempt  Distance: 5 ft to chair and 20 feet from chair  Comments: pt up in chair following              Activity Tolerance  Activity Tolerance: Patient Tolerated treatment well          PT Education  PT Education: Goals;PT Role;Plan of Care    ASSESSMENT:   Body structures, Functions, Activity limitations: Decreased functional mobility ; Decreased safe awareness;Decreased balance;Decreased ROM; Decreased strength  Decision Making: Medium Complexity  History: high  Exam: med  Clinical Presentation: med    Prognosis: Good  Barriers to Learning: none    DISCHARGE RECOMMENDATIONS:  Discharge Recommendations: Continue to assess pending progress    Assessment: Pt demonstrates deficits as listed . She is requiring assistance at this time. REQUIRES PT FOLLOW UP: Yes      PLAN OF CARE:  Plan  Times per week: 7  Times per day: Twice a day  Current Treatment Recommendations: Strengthening, Transfer Training, Neuromuscular Re-education, Patient/Caregiver Education & Training, Balance Training, Gait Training, Equipment Evaluation, Education, & procurement, Functional Mobility Training, Stair training, Safety Education & Training  Plan Comment: check ROM and SLR POD 1  Safety Devices  Type of devices: Left in chair, Chair alarm in place, Call light within reach    Goals:  Long term goals  Long term goal 1: indep bed mobility  Long term goal 2: indep bed transfers  Long term goal 3: indep gait with ww 50 feet  Long term goal 4: SBA 4 stairs for safe home entry  Long term goal 5: indep with HEP    AMPAC (6 CLICK) BASIC MOBILITY  AM-PAC Inpatient Mobility Raw Score : 17     Therapy Time:   Individual   Time In 1411   Time Out 1440   Minutes [address], 02/19/21 at 3:46 PM         Definitions for assistance levels  Independent = pt does not require any physical supervision or assistance from another person for activity completion. Device may be needed.   Stand by assistance = pt requires verbal cues or instructions from another person, close to but not touching, to perform the activity  Minimal assistance= pt performs 75% or more of the activity; assistance is required to complete the activity  Moderate assistance= pt performs 50% of the activity; assistance is required to complete the activity  Maximal assistance = pt performs 25% of the activity; assistance is required to complete the activity  Dependent = pt requires total physical assistance to accomplish the task

## 2021-02-19 NOTE — ANESTHESIA PRE PROCEDURE
Department of Anesthesiology  Preprocedure Note       Name:  Inez Sarmiento   Age:  76 y.o.  :  1955                                          MRN:  [id number]         Date:  2021      Surgeon: Sg Gautam):  Mally Winter MD    Procedure: Procedure(s):  RIGHT TOTAL KNEE ARTHROPLASTY WITH TIBIAL STEMS SUPINE; CONSTANTIN PERSONA TIBIAL STEM CONSTANTIN-NARCISO    Medications prior to admission:   Prior to Admission medications    Medication Sig Start Date End Date Taking?  Authorizing Provider   sertraline (ZOLOFT) 100 MG tablet TAKE 1 TABLET DAILY 21  Yes Briseida Garza MD   amLODIPine (NORVASC) 10 MG tablet Once a day 20  Yes Darby Avelar MD   levothyroxine (SYNTHROID) 100 MCG tablet Take 1 tablet by mouth Daily 20  Yes Darby Avelar MD   metFORMIN (GLUCOPHAGE) 500 MG tablet TAKE 1 TABLET TWICE A DAY WITH MEALS 20  Yes Darby Avelar MD   atorvastatin (LIPITOR) 10 MG tablet TAKE 1 TABLET DAILY 20  Yes Darby Avelar MD   ibuprofen (ADVIL;MOTRIN) 800 MG tablet Take 1 tablet by mouth every 6 hours as needed for Pain 20  Briseida Garza MD   blood glucose monitor strips Test BG once daily, DX: E11.9, NIDDM 18   Darby Avelar MD   Lancets MISC Test BG once daily, DX: E11.65, NIDDM 18   Darby Avelar MD   Syringe/Needle, Disp, (SYRINGE 3CC/25GX1\") 25G X 1\" 3 ML MISC Use for monthly B-12 injection 3/30/17   Darby Avelar MD   Blood Glucose Monitoring Suppl MIGUEL ANGEL Test BG once daily, DX: E11.65, NIDDM 17   Darby Avelar MD   Alcohol Swabs PADS Bid 16   Darby Avelar MD       Current medications:    Current Facility-Administered Medications   Medication Dose Route Frequency Provider Last Rate Last Admin    tranexamic acid (LYSTEDA) tablet 1,950 mg  1,950 mg Oral Once Adjondi Bennye Stack, APRN - CNP        tranexamic acid (LYSTEDA) tablet 1,950 mg  1,950 mg Oral Once Adjondi Bennye Stack, APRN - CNP        lactated ringers infusion   Intravenous Continuous ALY Kidd - CNP        lidocaine PF 1 % injection 1 mL  1 mL Intradermal Once PRN Zigmund Balding, APRN - CNP        sodium chloride flush 0.9 % injection 10 mL  10 mL Intravenous 2 times per day Zigmund Balding, APRN - CNP        sodium chloride flush 0.9 % injection 10 mL  10 mL Intravenous PRN Zigmund Balding, APRN - CNP        ceFAZolin (ANCEF) 2000 mg in dextrose 3 % 50 mL IVPB (duplex)  2,000 mg Intravenous Once Junita Councilman, MD        meperidine (DEMEROL) injection 12.5 mg  12.5 mg Intravenous Q5 Min PRN Naeem Olson MD        fentaNYL (SUBLIMAZE) injection 50 mcg  50 mcg Intravenous Q10 Min PRN Naeem Olson MD        HYDROmorphone (DILAUDID) injection 0.5 mg  0.5 mg Intravenous Q10 Min PRN Naeem Olson MD        HYDROcodone-acetaminophen [facility]) 5-325 MG per tablet 1 tablet  1 tablet Oral PRN Naeem Olson MD        Or    HYDROcodone-acetaminophen [facility]) 5-325 MG per tablet 2 tablet  2 tablet Oral PRN Naeem Olson MD        ondansetron [facility]) injection 4 mg  4 mg Intravenous Once PRN Naeem Olson MD        metoclopramide [facility]) injection 10 mg  10 mg Intravenous Once PRN Naeem Olson MD        diphenhydrAMINE (BENADRYL) injection 12.5 mg  12.5 mg Intravenous Once PRN Naeem Olson MD           Allergies:  No Known Allergies    Problem List:    Patient Active Problem List   Diagnosis Code    Hypothyroid E03.9    Obesity E66.9    Vitamin B12 deficiency E53.8    Uncontrolled diabetes mellitus type 2 without complications [id number]    DM2 (diabetes mellitus, type 2) ([facility] [address] E11.9    Adhesive capsulitis of both shoulders M75.01, M75.02    Herniated nucleus pulposus, C5-6 M50.222    Herniated nucleus pulposus, C6-7 left M50.223    Smoker F17.200    HTN (hypertension) I10    Hyperlipemia, mixed E78.2    Ingrowing nail C17.4    Metabolic syndrome L64.95    Radicular pain in right arm M79.2    Cervical radiculopathy M54.12    History of diabetes mellitus Z86.39    Knee pain M25.569    Personal history of malignant neoplasm Z85.9    Primary osteoarthritis of both knees M17.0    Shoulder joint pain M25.519    Ataxic gait R26.0    Status post total knee replacement, right Z96.651       Past Medical History:        Diagnosis Date    Cancer ([facility]     Depression     Hyperlipidemia     Hypertension     Thyroid disease     Type II diabetes mellitus, uncontrolled ([facility]        Past Surgical History:        Procedure Laterality Date    BREAST LUMPECTOMY Left 1996   [facility]       Social History:    Social History     Tobacco Use    Smoking status: Current Every Day Smoker     Types: Cigarettes    Smokeless tobacco: Never Used    Tobacco comment: trying to cut back   Substance Use Topics    Alcohol use: Never     Alcohol/week: 0.0 standard drinks     Frequency: Never                                Ready to quit: Not Answered  Counseling given: Not Answered  Comment: trying to cut back      Vital Signs (Current):   Vitals:    02/19/21 0553   BP: (!) 146/75   Pulse: 98   Resp: 16   Temp: 97.4 °F (36.3 °C)   TempSrc: Temporal   SpO2: 98%   Weight: 204 lb (92.5 kg)   Height: 5' 1.5\" (1.562 m)                                              BP Readings from Last 3 Encounters:   02/19/21 (!) 146/75   02/12/21 (!) 157/70   01/21/21 (!) 165/82       NPO Status: Time of last liquid consumption: 2200                        Time of last solid consumption: 2130                        Date of last liquid consumption: 02/18/21                        Date of last solid food consumption: 02/18/21    BMI:   Wt Readings from Last 3 Encounters:   02/19/21 204 lb (92.5 kg)   02/12/21 204 lb 12.8 oz (92.9 kg)   01/21/21 205 lb (93 kg)     Body mass index is 37.92 kg/m².     CBC:   Lab Results   Component Value Date    WBC 6.6 02/12/2021    RBC 4.48 02/12/2021    HGB 12.7 02/12/2021    HCT 39.2 02/12/2021    MCV 87.4 02/12/2021    RDW 13.8 02/12/2021     02/12/2021       CMP:   Lab Results   Component Value Date     02/12/2021    K 3.7 02/12/2021     02/12/2021    CO2 26 02/12/2021    BUN 16 02/12/2021    CREATININE 0.59 02/12/2021    GFRAA >60.0 02/12/2021    LABGLOM >60.0 02/12/2021    GLUCOSE 107 02/12/2021    PROT 7.1 05/15/2019    PROT 7.1 04/26/2018    CALCIUM 9.4 02/12/2021    BILITOT 0.4 05/15/2019    ALKPHOS 54 05/15/2019    AST 10 05/15/2019    ALT 13 05/15/2019       POC Tests:   Recent Labs     02/19/21  0637   POCGLU 118*       Coags: No results found for: PROTIME, INR, APTT    HCG (If Applicable): No results found for: PREGTESTUR, PREGSERUM, HCG, HCGQUANT     ABGs: No results found for: PHART, PO2ART, MHR6TNE, ZQT6LYU, BEART, R8JQVTHQ     Type & Screen (If Applicable):  No results found for: LABABO, LABRH    Drug/Infectious Status (If Applicable):  No results found for: HIV, HEPCAB    COVID-19 Screening (If Applicable):   Lab Results   Component Value Date    COVID19 Not Detected 02/12/2021         Anesthesia Evaluation  Patient summary reviewed and Nursing notes reviewed no history of anesthetic complications:   Airway: Mallampati: II  TM distance: >3 FB   Neck ROM: full  Mouth opening: > = 3 FB Dental: normal exam         Pulmonary:Negative Pulmonary ROS and normal exam                               Cardiovascular:    (+) hypertension:,       ECG reviewed                        Neuro/Psych:   Negative Neuro/Psych ROS  (+) neuromuscular disease:,             GI/Hepatic/Renal:   (+) morbid obesity          Endo/Other:    (+) Diabetes, hypothyroidism::., .                 Abdominal:   (+) obese,         Vascular:                                        Anesthesia Plan      spinal     ASA 3             Anesthetic plan and risks discussed with patient.         Attending anesthesiologist reviewed and agrees with Jose Antonio Suggs MD 2/19/2021

## 2021-02-19 NOTE — ACP (ADVANCE CARE PLANNING)
Advance Care Planning     Advance Care Planning Activator (Inpatient)  Conversation Note      Date of ACP Conversation: 1/12/2021    Conversation Conducted with: Patient with Decision Making Capacity    ACP Activator: [facility] Decision Maker:     Current Designated Health Care Decision Maker:     Primary Decision Maker: [place] - [phone]    Secondary Decision Maker: Fabrice Richard - Brother/Sister - [phone]    Supplemental (Other) Decision Maker: Neida Hatfield - Spouse - [phone]     Click here to complete Healthcare Decision Makers including section of the Healthcare Decision Maker Relationship (ie \"Primary\")  Today we discussed healthcare decision makers based on a family decision as the next of kin (patient's spouse) is hard of hearing so son is to be primary contact and healthcare decision maker. Care Preferences    Ventilation: \"If you were in your present state of health and suddenly became very ill and were unable to breathe on your own, what would your preference be about the use of a ventilator (breathing machine) if it were available to you? \"      Would the patient desire the use of ventilator (breathing machine)?: yes    \"If your health worsens and it becomes clear that your chance of recovery is unlikely, what would your preference be about the use of a ventilator (breathing machine) if it were available to you? \"     Would the patient desire the use of ventilator (breathing machine)?: Yes      Resuscitation  \"CPR works best to restart the heart when there is a sudden event, like a heart attack, in someone who is otherwise healthy. Unfortunately, CPR does not typically restart the heart for people who have serious health conditions or who are very sick. \" \"In the event your heart stopped as a result of an underlying serious health condition, would you want attempts to be made to restart your heart (answer \"yes\" for attempt to resuscitate) or would you prefer a natural death (answer \"no\" for do not attempt to resuscitate)? \" yes     [x] Yes   [] No   Educated Patient / [place] [facility] regarding differences between Advance Directives and portable DNR orders. Length of ACP Conversation in minutes:      Conversation Outcomes:  [x] ACP discussion completed  [] Existing advance directive reviewed with patient; no changes to patient's previously recorded wishes  [] New Advance Directive completed  [] Portable Do Not Rescitate prepared for Provider review and signature  [] POLST/POST/MOLST/MOST prepared for Provider review and signature      Follow-up plan:    [] Schedule follow-up conversation to continue planning  [x] Referred individual to Provider for additional questions/concerns   [] Advised patient/agent/surrogate to review completed ACP document and update if needed with changes in condition, patient preferences or care setting    [x] This note routed to one or more involved healthcare providers.

## 2021-02-19 NOTE — OP NOTE
[address]                      [address], [address] [facility]                                OPERATIVE REPORT    PATIENT NAME: Heaven Childress                   :        1955  MED REC NO:   [id number]                            ROOM:       W854  ACCOUNT NO:   [de-identified]                           ADMIT DATE: 2021  PROVIDER:     Daljit Orourke MD    DATE OF PROCEDURE:  2021    PREOPERATIVE DIAGNOSES:  Right knee arthritis and varus deformity. POSTOPERATIVE DIAGNOSES:  Right knee arthritis and varus deformity. OPERATION PERFORMED:  Right total knee replacement. SURGEON:  Dajlit Orourke MD    ASSISTANT:  Haris Urban PA-C was present throughout the entire case. Given the nature of the disease process and the procedure, a skilled  surgical first assistant was necessary during the case. The assistant  was necessary to hold retractors and manipulate the extremity during the  procedure. A certified scrub tech was at the back table managing the  instruments and supplies for the surgical case. BLOOD LOSS:  Minimal.    FLUIDS:  Less than 2 liters. ANESTHESIA:  Spinal with a regional block. COMPLICATIONS:  None. IMPLANT SIZES:  Cemented Persona 7 narrow femur, cemented Persona E  tibia, 10 mm PS poly, a 29 mm patella and no lateral release. OPERATION AND FINDINGS:  The patient was brought to the operating room  and a surgical time-out was performed. The patient received  preoperative antibiotics. After satisfactory anesthetic, the  appropriate knee was prepped and draped in the usual sterile fashion. A medial prepatellar incision was made and dissection carried sharply  through the skin and underlying fat to the fascial tissue. A median  capsular incision was made and the joint entered. There was a modest  amount of synovial fluid, the synovium was hypertrophic.     A drill hole was made in the center of the distal femur

## 2021-02-19 NOTE — BRIEF OP NOTE
Brief Postoperative Note      Patient: Aniyah Cali  YOB: 1955  MRN: [id number]    Date of Procedure: 2/19/2021    Pre-Op Diagnosis: RIGHT DJD KNEE, VARUS DEFORMITY    Post-Op Diagnosis: Same       Procedure(s):  RIGHT TOTAL KNEE ARTHROPLASTY WITH TIBIAL STEMS SUPINE; Ruthell So PERSONA TIBIAL STEM CONSTANTIN-NARCISO    Surgeon(s):  Rachel Bates MD    Assistant:  Physician Assistant: Va Agosto PA-C    Anesthesia: Spinal    Estimated Blood Loss (mL): Minimal    Complications: None    Specimens:   * No specimens in log *    Implants:  Implant Name Type Inv.  Item Serial No.  Lot No. LRB No. Used Action   CEMENT BNE 40GM HI VISC RADPQ FOR REV SURG  CEMENT BNE 40GM HI VISC RADPQ FOR REV SURG  [employer]- [id number] Right 2 Implanted   EXTENSION STEM SZ E 5DEG R TIBL KNEE BLANCA KEITH TIB PERSONA  EXTENSION STEM SZ E 5DEG R TIBL KNEE BLANCA KEITH TIB PERSONA  [employer]ANT[employer] INC-WD [id number] Right 1 Implanted   COMPONENT ARTC SURF PS 6-9 EF 10 MM RT TIB KNEE POLYETH  COMPONENT ARTC SURF PS 6-9 EF 10 MM RT TIB KNEE POLYETH  [employer]WD [id number] Right 1 Implanted   COMPONENT PAT [id number] KNEE POLY BLANCA CONVENTIONAL  COMPONENT PAT [id number] THK8MM KNEE POLY BLANCA CONVENTIONAL  [employer]WD [id number] Right 1 Implanted   COMPONENT FEM SZ 7 R KNEE CO CHROM KATHRINE POST STBL BLANCA  COMPONENT FEM SZ 7 R KNEE CO CHROM KATHRINE POST STBL BLANCA  [employer]WD [id number] Right 1 Implanted         Drains: * No LDAs found *    Findings: djd, varus deformity rt knee    Electronically signed by Rachel Bates MD on 2/19/2021 at 9:04 AM

## 2021-02-20 VITALS
HEIGHT: 62 IN | DIASTOLIC BLOOD PRESSURE: 57 MMHG | SYSTOLIC BLOOD PRESSURE: 129 MMHG | HEART RATE: 70 BPM | TEMPERATURE: 97.7 F | BODY MASS INDEX: 37.54 KG/M2 | OXYGEN SATURATION: 96 % | RESPIRATION RATE: 18 BRPM | WEIGHT: 204 LBS

## 2021-02-20 LAB
ANION GAP SERPL CALCULATED.3IONS-SCNC: 7 MEQ/L (ref 9–15)
BUN BLDV-MCNC: 21 MG/DL (ref 8–23)
CALCIUM SERPL-MCNC: 8.3 MG/DL (ref 8.5–9.9)
CHLORIDE BLD-SCNC: 103 MEQ/L (ref 95–107)
CO2: 26 MEQ/L (ref 20–31)
CREAT SERPL-MCNC: 0.76 MG/DL (ref 0.5–0.9)
GFR AFRICAN AMERICAN: >60
GFR NON-AFRICAN AMERICAN: >60
GLUCOSE BLD-MCNC: 125 MG/DL (ref 70–99)
GLUCOSE BLD-MCNC: 133 MG/DL (ref 60–115)
GLUCOSE BLD-MCNC: 161 MG/DL (ref 60–115)
HCT VFR BLD CALC: 31.2 % (ref 37–47)
HEMOGLOBIN: 10.2 G/DL (ref 12–16)
MCH RBC QN AUTO: 28.7 PG (ref 27–31.3)
MCHC RBC AUTO-ENTMCNC: 32.8 % (ref 33–37)
MCV RBC AUTO: 87.4 FL (ref 82–100)
PDW BLD-RTO: 14.3 % (ref 11.5–14.5)
PERFORMED ON: ABNORMAL
PERFORMED ON: ABNORMAL
PLATELET # BLD: 160 K/UL (ref 130–400)
POTASSIUM REFLEX MAGNESIUM: 4.1 MEQ/L (ref 3.4–4.9)
RBC # BLD: 3.57 M/UL (ref 4.2–5.4)
SODIUM BLD-SCNC: 136 MEQ/L (ref 135–144)
WBC # BLD: 7.2 K/UL (ref 4.8–10.8)

## 2021-02-20 PROCEDURE — 6370000000 HC RX 637 (ALT 250 FOR IP): Performed by: NURSE PRACTITIONER

## 2021-02-20 PROCEDURE — 85027 COMPLETE CBC AUTOMATED: CPT

## 2021-02-20 PROCEDURE — 6370000000 HC RX 637 (ALT 250 FOR IP): Performed by: ORTHOPAEDIC SURGERY

## 2021-02-20 PROCEDURE — 2700000000 HC OXYGEN THERAPY PER DAY

## 2021-02-20 PROCEDURE — 97116 GAIT TRAINING THERAPY: CPT

## 2021-02-20 PROCEDURE — 97110 THERAPEUTIC EXERCISES: CPT

## 2021-02-20 PROCEDURE — 80048 BASIC METABOLIC PNL TOTAL CA: CPT

## 2021-02-20 PROCEDURE — 36415 COLL VENOUS BLD VENIPUNCTURE: CPT

## 2021-02-20 PROCEDURE — 96374 THER/PROPH/DIAG INJ IV PUSH: CPT

## 2021-02-20 PROCEDURE — 97535 SELF CARE MNGMENT TRAINING: CPT

## 2021-02-20 PROCEDURE — 2580000003 HC RX 258: Performed by: ORTHOPAEDIC SURGERY

## 2021-02-20 PROCEDURE — 6360000002 HC RX W HCPCS: Performed by: ORTHOPAEDIC SURGERY

## 2021-02-20 PROCEDURE — G0378 HOSPITAL OBSERVATION PER HR: HCPCS

## 2021-02-20 RX ADMIN — TRANEXAMIC ACID 1950 MG: 650 TABLET ORAL at 06:42

## 2021-02-20 RX ADMIN — OXYCODONE 10 MG: 5 TABLET ORAL at 07:33

## 2021-02-20 RX ADMIN — SERTRALINE 100 MG: 100 TABLET, FILM COATED ORAL at 08:21

## 2021-02-20 RX ADMIN — ASPIRIN 81 MG: 81 TABLET, COATED ORAL at 08:21

## 2021-02-20 RX ADMIN — SODIUM CHLORIDE, PRESERVATIVE FREE 10 ML: 5 INJECTION INTRAVENOUS at 08:22

## 2021-02-20 RX ADMIN — AMLODIPINE BESYLATE 10 MG: 10 TABLET ORAL at 08:21

## 2021-02-20 RX ADMIN — LEVOTHYROXINE SODIUM 100 MCG: 100 TABLET ORAL at 06:42

## 2021-02-20 RX ADMIN — ACETAMINOPHEN 650 MG: 325 TABLET ORAL at 09:34

## 2021-02-20 RX ADMIN — OXYCODONE 10 MG: 5 TABLET ORAL at 15:49

## 2021-02-20 RX ADMIN — ONDANSETRON 4 MG: 2 INJECTION INTRAMUSCULAR; INTRAVENOUS at 08:21

## 2021-02-20 RX ADMIN — DIAZEPAM 5 MG: 5 TABLET ORAL at 15:49

## 2021-02-20 RX ADMIN — DIAZEPAM 5 MG: 5 TABLET ORAL at 09:34

## 2021-02-20 RX ADMIN — OXYCODONE 10 MG: 5 TABLET ORAL at 11:30

## 2021-02-20 RX ADMIN — DIAZEPAM 5 MG: 5 TABLET ORAL at 03:31

## 2021-02-20 RX ADMIN — DOCUSATE SODIUM 50 MG AND SENNOSIDES 8.6 MG 1 TABLET: 8.6; 5 TABLET, FILM COATED ORAL at 08:21

## 2021-02-20 RX ADMIN — METFORMIN HYDROCHLORIDE 500 MG: 500 TABLET ORAL at 08:21

## 2021-02-20 ASSESSMENT — PAIN SCALES - GENERAL
PAINLEVEL_OUTOF10: 4
PAINLEVEL_OUTOF10: 3
PAINLEVEL_OUTOF10: 5
PAINLEVEL_OUTOF10: 4
PAINLEVEL_OUTOF10: 7
PAINLEVEL_OUTOF10: 3
PAINLEVEL_OUTOF10: 7

## 2021-02-20 ASSESSMENT — PAIN DESCRIPTION - LOCATION
LOCATION: KNEE

## 2021-02-20 ASSESSMENT — PAIN DESCRIPTION - FREQUENCY: FREQUENCY: CONTINUOUS

## 2021-02-20 ASSESSMENT — PAIN DESCRIPTION - PAIN TYPE
TYPE: SURGICAL PAIN
TYPE: SURGICAL PAIN

## 2021-02-20 ASSESSMENT — PAIN DESCRIPTION - ONSET: ONSET: ON-GOING

## 2021-02-20 ASSESSMENT — PAIN DESCRIPTION - PROGRESSION: CLINICAL_PROGRESSION: GRADUALLY WORSENING

## 2021-02-20 ASSESSMENT — PAIN DESCRIPTION - ORIENTATION: ORIENTATION: RIGHT

## 2021-02-20 NOTE — DISCHARGE INSTR - DIET
? Good nutrition is important when healing from an illness, injury, or surgery. Follow any nutrition recommendations given to you during your hospital stay. ? If you were given an oral nutrition supplement while in the hospital, continue to take this supplement at home. You can take it with meals, in-between meals, and/or before bedtime. These supplements can be purchased at most local grocery stores, pharmacies, and chain [employer]stores. ? If you have any questions about your diet or nutrition, call the hospital and ask for the dietitian. Carb control diet.

## 2021-02-20 NOTE — DISCHARGE INSTR - COC
Continuity of Care Form    Patient Name: Andrea Sheffield   :  [birth year]  MRN:  [id number]    [facility] date:  2021  Discharge date:  21    Code Status Order: Full Code   Advance Directives:   [facility] Documentation     Date/Time Healthcare Directive Type of Healthcare Directive Copy in [address] Agent's Name Healthcare Agent's Phone Number    21 3040  No, patient does not have an advance directive for healthcare treatment -- -- -- -- --          Admitting Physician:  Gabriella Polanco MD  PCP: ALY Murphy Arm, CNP    Discharging Nurse: Vimal Unit/Room#: N225/N225-01  Discharging Unit Phone Number: [phone]    Emergency Contact:   Extended Emergency Contact Information  Primary Emergency Contact: Rober Looney, [address] [place] [address] Phone: [phone]  Relation: Child  Preferred language: English   needed? No  Secondary Emergency Contact: Sara Bello  Address: [address] [place] [address] Phone: [phone]  Mobile Phone: [phone]  Relation: Spouse  Contact is hearing impaired. Preferred language: Hearing Impaired   needed? Yes    Past Surgical History:  Past Surgical History:   Procedure Laterality Date    BREAST LUMPECTOMY Left 1996       Immunization History: There is no immunization history on file for this patient.     Active Problems:  Patient Active Problem List   Diagnosis Code    Hypothyroid E03.9    Obesity E66.9    Vitamin B12 deficiency E53.8    Uncontrolled diabetes mellitus type 2 without complications [id number]    DM2 (diabetes mellitus, type 2) (HCC) E11.9    Adhesive capsulitis of both shoulders M75.01, M75.02    Herniated nucleus pulposus, C5-6 M50.222    Herniated nucleus pulposus, C6-7 left M50.223    Smoker F17.200    HTN (hypertension) I10    Hyperlipemia, mixed E78.2    Ingrowing nail R50.2    Metabolic syndrome G54.39    Radicular pain in right arm M79.2    Cervical radiculopathy M54.12    History of diabetes mellitus Z86.39    Knee pain M25.569    Personal history of malignant neoplasm Z85.9    Primary osteoarthritis of both knees M17.0    Shoulder joint pain M25.519    Ataxic gait R26.0    Status post total knee replacement, right Z96.651       Isolation/Infection:   Isolation          No Isolation        Patient Infection Status     Infection Onset Added Last Indicated Last Indicated By Review Planned Expiration Resolved Resolved By    None active    Resolved    COVID-19 Rule Out 02/12/21 02/12/21 02/12/21 Covid-19 Ambulatory (Ordered)   02/13/21 Rule-Out Test Resulted          Nurse Assessment:  Last Vital Signs: BP (!) 129/57   Pulse 70   Temp 97.7 °F (36.5 °C) (Oral)   Resp 18   Ht 5' 1.5\" (1.562 m)   Wt 204 lb (92.5 kg)   SpO2 96%   BMI 37.92 kg/m²     Last documented pain score (0-10 scale): Pain Level: 7  Last Weight:   Wt Readings from Last 1 Encounters:   02/19/21 204 lb (92.5 kg)     Mental Status:  oriented, alert and thought processes intact    IV Access:  - None    Nursing Mobility/ADLs:  Walking   Assisted  Transfer  Assisted  Bathing  Assisted  Dressing  Assisted  Toileting  Assisted  Feeding  Independent  Med Admin  Independent  Med Delivery   whole    Wound Care Documentation and Therapy:        Elimination:  Continence:   · Bowel: Yes  · Bladder: Yes  Urinary Catheter: None   Colostomy/Ileostomy/Ileal Conduit: No       Date of Last BM: ***    Intake/Output Summary (Last 24 hours) at 2/20/2021 1611  Last data filed at 2/20/2021 0286  Gross per 24 hour   Intake 370 ml   Output --   Net 370 ml     I/O last 3 completed shifts: In: 370 [P.O.:360; I.V.:10]  Out: -     Safety Concerns:      At Risk for Falls    Impairments/Disabilities:      None    Nutrition Therapy:  Current Nutrition Therapy:   - Oral Diet:  Carb Control 4

## 2021-02-20 NOTE — PROGRESS NOTES
[facility] OCCUPATIONAL THERAPY ORTHOPEDIC TREATMENT NOTE     Date: 2021  Patient Name: Xuan Gonsalez        MRN: [id number]  Account: [de-identified]   : 1955  (76 y.o.)  Room: [place] [address]    Chart Review:  Diagnosis:  The primary encounter diagnosis was Post-op pain. A diagnosis of Muscle spasm was also pertinent to this visit. Restrictions:    Restrictions/Precautions  Restrictions/Precautions: Weight Bearing      Subjective:  Patient states: \"I have help at home. \"  Pain:  Start of tx:  Pre Treatment Pain Screening  Pain at present: 2  Scale Used: Numeric Score  Intervention List: Patient able to continue with treatment, Patient declined any intervention    End of tx:  Pain Assessment  Patient Currently in Pain: Yes  Pain Assessment: 0-10  Pain Level: 3  Pain Type: Surgical pain  Pain Location: Knee  Pain Orientation: Right  Pain Descriptors: Aching  Pain Frequency: Continuous  Clinical Progression: Gradually worsening    Objective: Sit <> Stand transfers: MI. Pt performed functional mobility throughout room environment using a FWW with Supervision. ADL shower session completed as follows. ADL  ADL  Equipment Provided: Sock aid  Grooming: Independent (To wash face. Pt declined oral care and hair care at this time)  UE Bathing: Modified independent (use of detachable shower head)  LE Bathing: Modified independent   UE Dressing: Unable to assess ([address] only)  LE Dressing: Setup, Supervision, Verbal cueing (To doff/don bilateral socks and don underwear/pants. Educated pt in the use of sock aid to don bilateral socks. Pt able to return demonstration with no difficulty. VCs to dress surgical leg first.)      Shower Transfers  Shower - Transfer From: Otis Hale - Transfer Type: To and From  Shower - Transfer To:  Shower seat with back  Shower - Technique: Ambulating  Shower Transfers: Modified independence    RONAK Hose donned: Yes      Therapy key for assistance levels -   Independent = Pt. is able to perform task with no assistance but may require a device   Stand by assistance = Pt. does not perform task at an independent level but does not need physical assistance, requires verbal cues  Minimal, Moderate, Maximal Assistance = Pt. requires physical assistance (25%, 50%, 75% assist from helper) for task but is able to actively participate in task   Dependent = Pt. requires total assistance with task and is not able to actively participate with task completion    Cognition:  Cognition  Overall Cognitive Status: WFL    Treatment consisted of:  ADL training    Assessment/Discharge Disposition: Pt tolerated treatment well.    Performance deficits / Impairments: Decreased functional mobility , Decreased ADL status, Decreased ROM, Decreased strength, Decreased endurance, Decreased balance, Decreased coordination  Prognosis: Good  Discharge Recommendations: Continue to assess pending progress  History: Patient with moderately complex medical history  Exam: 7 deficits  Assistance / Modification: Min-Mod A    [employer]  AM-PAC Daily Activity Inpatient   How much help for putting on and taking off regular lower body clothing?: A Little  How much help for Bathing?: A Little  How much help for Toileting?: None  How much help for putting on and taking off regular upper body clothing?: None  How much help for taking care of personal grooming?: None  How much help for eating meals?: None  AM-[facility] Inpatient Daily Activity Raw Score: 22  AM-PAC Inpatient ADL T-Scale Score : 47.1  ADL Inpatient CMS 0-100% Score: 25.8  ADL Inpatient CMS G-Code Modifier : CJ    Plan:  Continue OT per POC    Goals/Plan of care addressed during this session:  Improve [place] with ADLs and Improve [place] with Functional Transfers    Minutes:  OT Individual Minutes  Time In: 820  Time Out: 925  Minutes: 38    ADL trainin minutes    Electronically signed by:    MEGAN Bolanos    2021, 9:49 AM How Severe Is This Condition?: moderate

## 2021-02-20 NOTE — PROGRESS NOTES
Physical Therapy Med Surg Daily Treatment Note  Facility/Department: [facility]  Room: N225/N225-01       NAME: Andrea Sheffield  :  (81 y.o.)  MRN: [id number]  CODE STATUS: Full Code    Date of Service: 2021    Patient Diagnosis(es): Status post total knee replacement, right [Z96.651]   No chief complaint on file. Patient Active Problem List    Diagnosis Date Noted    Status post total knee replacement, right 2021    Ataxic gait 2020    History of diabetes mellitus 09/15/2020    Knee pain 09/15/2020    Personal history of malignant neoplasm 09/15/2020    Primary osteoarthritis of both knees 09/15/2020    Shoulder joint pain 09/15/2020    Radicular pain in right arm 2020    Cervical radiculopathy 2020    Adhesive capsulitis of both shoulders 2017    Herniated nucleus pulposus, C5-6 2017    Herniated nucleus pulposus, C6-7 left 2017    Smoker 2017    Uncontrolled diabetes mellitus type 2 without complications     Obesity 2016    Vitamin B12 deficiency 2016    DM2 [facility]diabetes mellitus, type 2) ([facility] 2015    HTN (hypertension) 2015    Hypothyroid 2015    Hyperlipemia, mixed     Metabolic syndrome     Ingrowing nail 2009        Past Medical History:   Diagnosis Date    Cancer ([facility]     Depression     Hyperlipidemia     Hypertension     Thyroid disease     Type II diabetes mellitus, uncontrolled ([facility]      Past Surgical History:   Procedure Laterality Date    BREAST LUMPECTOMY Left 1996     SECTION  1981     SECTION         Restrictions  Restrictions/Precautions: Weight Bearing  Lower Extremity Weight Bearing Restrictions  Right Lower Extremity Weight Bearing: Weight Bearing As Tolerated    SUBJECTIVE   General  Chart Reviewed: Yes  Family / Caregiver Present: No  Subjective  Subjective:  \"It doesn't hurt when I'm laying here.\"    Pre-Session Pain Report  Pre Treatment Pain Screening  Pain at present: 4  Scale Used: Numeric Score  Intervention List: Patient able to continue with treatment  Pain Screening  Patient Currently in Pain: Yes       Post-Session Pain Report  Pain Assessment  Pain Assessment: 0-10  Pain Level: 4  Pain Type: Surgical pain  Pain Location: Knee  Pain Orientation: Right         OBJECTIVE        Bed mobility  Supine to Sit: Modified independent  Sit to Supine: (DNT pt with MEGAN after session.)    Transfers  Sit to Stand: Stand by assistance  Stand to sit: Stand by assistance  Car Transfer: Stand by assistance  Comment: vc's for hand placement and safety, pt with good carryover. Ambulation  Ambulation?: Yes  Ambulation 1  Surface: level tile  Device: Rolling Walker  Assistance: Stand by assistance  Quality of Gait: decreaased RLE stance time and weight acceptance, variable UE support required, improved quality with increased distance  Distance: 25' x2    Stairs/Curb  Stairs?: Yes  Stairs  # Steps : 4  Stairs Height: 6\"  Rails: Right ascending  Device: Single pt cane  Assistance: Stand by assistance  Comment: vc's for non reciprocal pattern, pt steady and safe while attempting. Exercises  Straight Leg Raise: x 10  Quad Sets: x 10  Heelslides: x 10  Gluteal Sets: x 10  Hip Abduction: x 10  Ankle Pumps: x 10  Comments: pt given written HEP instructed on technique dosage and frequency of all exercises, pt verbalizes understanding. Activity Tolerance  Activity Tolerance: Patient Tolerated treatment well          ASSESSMENT   Assessment: pt with good mobility, safe on stairs and car.      Discharge Recommendations:  Continue to assess pending progress    Goals  Long term goals  Long term goal 1: indep bed mobility  Long term goal 2: indep bed transfers  Long term goal 3: indep gait with ww 50 feet  Long term goal 4: SBA 4 stairs for safe home entry  Long term goal 5: indep with HEP    PLAN    Times per week: 7  Times per day: Twice a day  Safety Devices  Type of devices: Left in chair, Chair alarm in place, Call light within reach     [facility] (6 CLICK) [address] Raw Score : 19      Therapy Time   Individual   Time In 0752   Time Out 0820   Minutes 28      Gait: 15  Therex: 8  BM/Trsf: [address] Pawan Michaelquan., PTA, 02/20/21 at 8:26 AM         Definitions for assistance levels  Independent = pt does not require any physical supervision or assistance from another person for activity completion. Device may be needed.   Stand by assistance = pt requires verbal cues or instructions from another person, close to but not touching, to perform the activity  Minimal assistance= pt performs 75% or more of the activity; assistance is required to complete the activity  Moderate assistance= pt performs 50% of the activity; assistance is required to complete the activity  Maximal assistance = pt performs 25% of the activity; assistance is required to complete the activity  Dependent = pt requires total physical assistance to accomplish the task

## 2021-02-20 NOTE — PLAN OF CARE
Problem: IP BALANCE  Goal: LTG - Patient will maintain balance to allow for safe/functional mobility  Outcome: Ongoing     Problem: Pain:  Description: Pain management should include both nonpharmacologic and pharmacologic interventions.   Goal: Pain level will decrease  Description: Pain level will decrease  Outcome: Ongoing  Goal: Control of acute pain  Description: Control of acute pain  Outcome: Ongoing  Goal: Control of chronic pain  Description: Control of chronic pain  Outcome: Ongoing     Problem: Falls - Risk of:  Goal: Will remain free from falls  Description: Will remain free from falls  Outcome: Ongoing  Goal: Absence of physical injury  Description: Absence of physical injury  Outcome: Ongoing

## 2021-02-20 NOTE — PROGRESS NOTES
Physical Therapy Med Surg Daily Treatment Note  Facility/Department: [facility]  Room: N225/N225-01       NAME: Jeff Maki  :  (33 y.o.)  MRN: [id number]  CODE STATUS: Full Code    Date of Service: 2021    Patient Diagnosis(es): Status post total knee replacement, right [Z96.651]   No chief complaint on file. Patient Active Problem List    Diagnosis Date Noted    Status post total knee replacement, right 2021    Ataxic gait 2020    History of diabetes mellitus 09/15/2020    Knee pain 09/15/2020    Personal history of malignant neoplasm 09/15/2020    Primary osteoarthritis of both knees 09/15/2020    Shoulder joint pain 09/15/2020    Radicular pain in right arm 2020    Cervical radiculopathy 2020    Adhesive capsulitis of both shoulders 2017    Herniated nucleus pulposus, C5-6 2017    Herniated nucleus pulposus, C6-7 left 2017    Smoker 2017    Uncontrolled diabetes mellitus type 2 without complications     Obesity 2016    Vitamin B12 deficiency 2016    DM2 (diabetes mellitus, type 2) ([facility] 2015    HTN (hypertension) 2015    Hypothyroid 2015    Hyperlipemia, mixed     Metabolic syndrome     Ingrowing nail 2009        Past Medical History:   Diagnosis Date    Cancer ([facility]     Depression     Hyperlipidemia     Hypertension     Thyroid disease     Type II diabetes mellitus, uncontrolled ([facility]      Past Surgical History:   Procedure Laterality Date    BREAST LUMPECTOMY Left 1996     SECTION       SECTION         Restrictions  Restrictions/Precautions: Weight Bearing  Lower Extremity Weight Bearing Restrictions  Right Lower Extremity Weight Bearing: Weight Bearing As Tolerated    SUBJECTIVE   General  Chart Reviewed: Yes  Family / Caregiver Present: No  Subjective  Subjective: \"It's hurting more. \"    Pre-Session Pain Report  Pre Treatment Pain Screening  Pain at present: 7  Scale Used: Numeric Score  Intervention List: Patient able to continue with treatment  Pain Screening  Patient Currently in Pain: Yes       Post-Session Pain Report  Pain Assessment  Pain Assessment: 0-10  Pain Level: 7  Pain Type: Surgical pain  Pain Location: Knee  Pain Orientation: Right         OBJECTIVE        Bed mobility  Supine to Sit: Modified independent  Sit to Supine: Modified independent    Transfers  Sit to Stand: Stand by assistance  Stand to sit: Stand by assistance  Comment: vc's for hand placement and safety, pt with good carryover. Ambulation  Ambulation?: Yes  Ambulation 1  Surface: level tile  Device: Rolling Walker  Assistance: Stand by assistance  Quality of Gait: decreaased RLE stance time and weight acceptance, variable UE support required, improved quality with increased distance  Distance: 150'                   Exercises  Hip Flexion: x 10  Hip Abduction: seated x 10  Knee Long Arc Quad: x 10  Ankle Pumps: x 10  Comments: reviewed written HEP for supine/seated therex, instructed on technique dosage and frequency of all exercises, pt verbalizes understanding. Activity Tolerance  Activity Tolerance: Patient Tolerated treatment well          ASSESSMENT   Assessment: pt with good mobility, verbalizes understanding of instruction and PT POC     Discharge Recommendations:  Continue to assess pending progress    Goals  Long term goals  Long term goal 1: indep bed mobility  Long term goal 2: indep bed transfers  Long term goal 3: indep gait with ww 50 feet  Long term goal 4: SBA 4 stairs for safe home entry  Long term goal 5: indep with HEP    PLAN    Times per week: 7  Times per day: Twice a day  Safety Devices  Type of devices:  All fall risk precautions in place, Bed alarm in place, Call light within reach, Left in bed     [facility] (6 CLICK) BASIC MOBILITY  AM-PAC Inpatient Mobility Raw Score : 19      Therapy Time   Individual Time In 1316   Time Out 1334   Minutes 18      Gait: 12  Therex: 4  BM/Trsf: 2        Aidee Shaikh, YURIY, 02/20/21 at 1:37 PM         Definitions for assistance levels  Independent = pt does not require any physical supervision or assistance from another person for activity completion. Device may be needed.   Stand by assistance = pt requires verbal cues or instructions from another person, close to but not touching, to perform the activity  Minimal assistance= pt performs 75% or more of the activity; assistance is required to complete the activity  Moderate assistance= pt performs 50% of the activity; assistance is required to complete the activity  Maximal assistance = pt performs 25% of the activity; assistance is required to complete the activity  Dependent = pt requires total physical assistance to accomplish the task

## 2021-02-20 NOTE — PROGRESS NOTES
Progress Note   TKA    Subjective:     Post-Operative Day: 1 Status Post right Total Knee Arthroplasty  Systemic or Specific Complaints:No Complaints    Objective:     CURRENT VITALS:  BP (!) 129/57   Pulse 70   Temp 97.7 °F (36.5 °C) (Oral)   Resp 18   Ht 5' 1.5\" (1.562 m)   Wt 204 lb (92.5 kg)   SpO2 96%   BMI 37.92 kg/m²     General: alert, appears stated age and cooperative   Wound: Wound clean and dry no evidence of infection. Motion: Painless Range of Motion   DVT Exam: No evidence of DVT seen on physical exam.       Knee swollen but thigh soft to palpation. Moving foot and ankle. Good distal pulses. Data Review  No results for input(s): WBC, RBC, HGB, HCT, MCV, MCH, MCHC, RDW, PLT, MPV in the last 72 hours. Assessment:     Status Post right Total Knee Arthroplasty. Doing well postoperatively.      Plan:      1: Continues current post-op course :  2:  Continue Deep venous thrombosis prophylaxis  3:  Continue physical therapy  4:  Continue Pain Control

## 2021-02-20 NOTE — FLOWSHEET NOTE
3410: AM assessment completed and documented on flow sheet. 1600: Patient given discharge instructions with son at bedside. Patient expresses verbal understanding of incision care, RONAK hose, follow up appointments and new RX. Patient given print out for new RX's. Awaiting transport.

## 2021-02-20 NOTE — PROGRESS NOTES
[facility] Progress Note  Name: Cedrick Mcelroy  Age: 76 y.o. Gender: female  CodeStatus: Full Code  Allergies: No Known Allergies    Chief Complaint:No chief complaint on file. Primary Care Provider: No primary care provider on file. InpatientTreatment Team: Treatment Team: Attending Provider: Alisha Walker MD; Surgeon: Alisha Walker MD; Utilization Reviewer: Ching Porras RN; Consulting Physician: Liam Selby MD; : Eyad Kearns RN; Utilization Reviewer: Adenike Covarrubias RN; Registered Nurse: Joanie Schmitz RN  Admission Date: 2/19/2021      Subjective: Sitting up in bed. Reports she is having pain to R knee. No chest pain, SOB, N/V/D. Ready for discharge    Physical Exam  Constitutional:       General: She is not in acute distress. Appearance: She is obese. She is not ill-appearing, toxic-appearing or diaphoretic. HENT:      Head: Normocephalic and atraumatic. Right Ear: External ear normal.      Mouth/Throat:      Mouth: Mucous membranes are dry. Pharynx: Oropharynx is clear. Eyes:      Conjunctiva/sclera: Conjunctivae normal.   Neck:      Musculoskeletal: Normal range of motion and neck supple. Cardiovascular:      Rate and Rhythm: Normal rate and regular rhythm. Pulses: Normal pulses. Pulmonary:      Effort: Pulmonary effort is normal.      Breath sounds: Normal breath sounds. Abdominal:      General: Bowel sounds are normal.      Palpations: Abdomen is soft. Musculoskeletal: Normal range of motion. Right lower leg: Edema present. Skin:     General: Skin is warm and dry. Capillary Refill: Capillary refill takes less than 2 seconds. Neurological:      General: No focal deficit present. Mental Status: She is alert. Psychiatric:         Mood and Affect: Mood normal.         Review of Systems   Musculoskeletal:        R knee pain   All other systems reviewed and are negative.     Vitals:    02/19/21 1231 02/19/21 1950 21 2257 21 0335   BP: 128/79 103/62 (!) 109/51 (!) 129/57   Pulse: 61 68 62 70   Resp:     Temp:  97.9 °F (36.6 °C) 97.7 °F (36.5 °C) 97.7 °F (36.5 °C)   TempSrc:  Oral Oral Oral   SpO2:  95% 94% 96%   Weight:       Height:           Past Medical History:   Diagnosis Date    Cancer ([facility]     Depression     Hyperlipidemia     Hypertension     Thyroid disease     Type II diabetes mellitus, uncontrolled ([facility]      Past Surgical History:   Procedure Laterality Date    BREAST LUMPECTOMY Left 1996     SECTION  1981     SECTION  1985         Medications:  Reviewed  Prior to Admission medications    Medication Sig Start Date End Date Taking? Authorizing Provider   oxyCODONE (ROXICODONE) 5 MG immediate release tablet Take 1 tablet by mouth every 6 hours as needed for Pain for up to 7 days. 21 Yes ALY Tompkins CNP   aspirin 81 MG EC tablet Take 1 tablet by mouth 2 times daily 2/19/21 3/21/21 Yes ALY Tompkins CNP   diazePAM (VALIUM) 5 MG tablet Take 1 tablet by mouth every 6 hours as needed (muscle spasm) for up to 5 days.  21 Yes ALY Tompkins CNP   sertraline (ZOLOFT) 100 MG tablet TAKE 1 TABLET DAILY 21  Yes Nadine Rosenbaum MD   amLODIPine (NORVASC) 10 MG tablet Once a day 20  Yes Lydia Dallas MD   levothyroxine (SYNTHROID) 100 MCG tablet Take 1 tablet by mouth Daily 20  Yes Lydia Dallas MD   metFORMIN (GLUCOPHAGE) 500 MG tablet TAKE 1 TABLET TWICE A DAY WITH MEALS 20  Yes Lydia Dallas MD   atorvastatin (LIPITOR) 10 MG tablet TAKE 1 TABLET DAILY 20  Yes Lydia Dallas MD   ibuprofen (ADVIL;MOTRIN) 800 MG tablet Take 1 tablet by mouth every 6 hours as needed for Pain 20  Nadine Rosenbaum MD   blood glucose monitor strips Test BG once daily, DX: E11.9, NIDDM 18   Lydia Dallas MD   Lancets MISC Test BG once daily, DX: E11.65, NIDDM 18   Lydia Dallas MD   Syringe/Needle, Disp, (SYRINGE 3CC/25GX1\") 25G X 1\" 3 ML MISC Use for monthly B-12 injection 3/30/17   Shyla Samuel MD   Blood Glucose Monitoring Suppl MIGUEL ANGEL Test BG once daily, DX: E11.65, NIDDM 1/6/17   Shyla Samuel MD   Alcohol Swabs PADS Bid 8/1/16   Shyla Samuel MD       Current Facility-Administered Medications   Medication Dose Route Frequency Provider Last Rate Last Admin    sodium chloride flush 0.9 % injection 10 mL  10 mL Intravenous 2 times per day Moo Ríos MD   10 mL at 02/20/21 7814    sodium chloride flush 0.9 % injection 10 mL  10 mL Intravenous PRN Moo Ríos MD        acetaminophen (TYLENOL) tablet 650 mg  650 mg Oral Q6H Moo Ríos MD   650 mg at 02/20/21 3795    oxyCODONE (ROXICODONE) immediate release tablet 5 mg  5 mg Oral Q4H PRN Moo Ríos MD        Or   [facility] oxyCODONE (ROXICODONE) immediate release tablet 10 mg  10 mg Oral Q4H PRN Moo Ríos MD   10 mg at 02/20/21 1130    morphine (PF) injection 2 mg  2 mg Intravenous Q3H PRN Moo Ríos MD        ondansetron (ZOFRAN-ODT) disintegrating tablet 4 mg  4 mg Oral Q8H PRN Moo Ríos MD        Or    ondansetron [facility]) injection 4 mg  4 mg Intravenous Q6H PRN Moo Ríos MD   4 mg at 02/20/21 4013    sennosides-docusate sodium (SENOKOT-S) 8.6-50 MG tablet 1 tablet  1 tablet Oral BID Moo Ríos MD   1 tablet at 02/20/21 1058    magnesium hydroxide (MILK OF MAGNESIA) 400 MG/5ML suspension 30 mL  30 mL Oral Daily PRN Moo Ríos MD        aspirin EC tablet 81 mg  81 mg Oral BID Moo Ríos MD   81 mg at 02/20/21 1351    famotidine (PEPCID) injection 20 mg  20 mg Intravenous BID PRN Moo Ríos MD        diazePAM (VALIUM) tablet 5 mg  5 mg Oral Q6H PRN ALY Valderrama - CNP   5 mg at 02/20/21 0934    insulin lispro (HUMALOG) injection vial 0-6 Units  0-6 Units Subcutaneous TID  ALY Lewis NP   Stopped at 02/19/21 1345    insulin lispro (HUMALOG) injection vial 0-3 Units  0-3 Units Subcutaneous Nightly Jesusita Morgan, APRN - NP        glucose (GLUTOSE) 40 % oral gel 15 g  15 g Oral PRN Jesusita Morgan, APRN - NP        dextrose 50 % IV solution  12.5 g Intravenous PRN Jesusita Morgan, APRN - NP        glucagon (rDNA) injection 1 mg  1 mg Intramuscular PRN Jesusita Morgan, APRN - NP        dextrose 5 % solution  100 mL/hr Intravenous PRN Jesusita Morgan, APRN - NP        amLODIPine (NORVASC) tablet 10 mg  10 mg Oral Daily Jesusita Morgan, APRN - NP   10 mg at 02/20/21 6983    atorvastatin (LIPITOR) tablet 10 mg  10 mg Oral Nightly Jesusita Morgan, APRN - NP   10 mg at 02/19/21 2106    levothyroxine (SYNTHROID) tablet 100 mcg  100 mcg Oral Daily Jesusita Morgan, APRN - NP   100 mcg at 02/20/21 0220    metFORMIN (GLUCOPHAGE) tablet 500 mg  500 mg Oral BID WC Jesusita Morgan, APRN - NP   500 mg at 02/20/21 7805    sertraline (ZOLOFT) tablet 100 mg  100 mg Oral Daily Jesusita Morgan, APRN - NP   100 mg at 02/20/21 3458        Infusion Medications:    dextrose       Scheduled Medications:    sodium chloride flush  10 mL Intravenous 2 times per day    acetaminophen  650 mg Oral Q6H    sennosides-docusate sodium  1 tablet Oral BID    aspirin  81 mg Oral BID    insulin lispro  0-6 Units Subcutaneous TID     insulin lispro  0-3 Units Subcutaneous Nightly    amLODIPine  10 mg Oral Daily    atorvastatin  10 mg Oral Nightly    levothyroxine  100 mcg Oral Daily    metFORMIN  500 mg Oral BID WC    sertraline  100 mg Oral Daily     PRN Meds: sodium chloride flush, oxyCODONE **OR** oxyCODONE, morphine, ondansetron **OR** ondansetron, magnesium hydroxide, famotidine (PEPCID) injection, diazePAM, glucose, dextrose, glucagon (rDNA), dextrose    Labs:   Recent Labs     02/20/21  0602   WBC 7.2   HGB 10.2*   HCT 31.2*        Recent Labs     02/20/21  0602      K 4.1      CO2 26   BUN 21   CREATININE 0.76   CALCIUM 8.3*     No results for input(s): AST, ALT, BILIDIR, BILITOT, ALKPHOS in the last 72 hours. No results for input(s): INR in the last 72 hours. No results for input(s): Lewanda Bene in the last 72 hours. Urinalysis:   Lab Results   Component Value Date    NITRU Negative 02/12/2021    BLOODU Negative 02/12/2021    SPECGRAV 1.023 02/12/2021    GLUCOSEU Negative 02/12/2021       Radiology:   Most recent    Chest CT      WITH CONTRAST:No results found for this or any previous visit. WITHOUT CONTRAST: No results found for this or any previous visit. CXR      2-view: No results found for this or any previous visit. Portable: No results found for this or any previous visit. Echo No results found for this or any previous visit. Assessment/Plan:    1. R knee DJD. S/p RTKA. Primary team managing, IVF, pain mananagement, ATbx- plan discharge today  2. DM type II: resume home meds, SSI, POCT glucose ACHS while inpatient, hypoglycemia protocol  3. HTN/hpl: stable, resume home meds  4. Hypothyroidism: home meds  5. Depression/anxiety: home meds  6. DVT proph: primary team       Active Hospital Problems    Diagnosis Date Noted    Status post total knee replacement, right [Z96.651] 02/19/2021       Additional work up or/and treatment plan may be added today or then after based on clinical progression. I am managing a portion of pt care. Some medical issues are handled byother specialists. Additional work up and treatment should be done in out pt setting by pt PCP and other out pt providers. In addition to examining and evaluating pt, I spent additional time explaining care, normaland abnormal findings, and treatment plan. All of pt questions were answered. Counseling, diet and education were provided. Case will be discussed with nursing staff when appropriate. Family will be updated if and whenappropriate.       Electronically signed by ALY Lewis NP on 2/20/2021 at 3:16 PM

## 2021-02-20 NOTE — DISCHARGE SUMMARY
Discharge summary  This patient Aniyah Cali was admitted to the hospital on 2/19/2021  after undergoing Procedure(s) (LRB):  RIGHT TOTAL KNEE ARTHROPLASTY WITH TIBIAL STEMS SUPINE; CONSTANTIN PERSONA TIBIAL STEM CONSTANTIN-NARCISO (Right) without complications that morning. During the postoperative period,while in hospital, patient was medically managed by the hospitalist. Please see medial notes and H&P for patients additional diagnoses. Ortho agrees with all medical diagnoses and treatments while patient in hospital.    No significant or unexpected findings noted during hospital stay. ..... Hospital course  Patient tolerated surgical procedure well and there was no complications. Patient progressed adequtly through their recovery during hospital stay including PT and rehabilitation. DVT prophylaxis was implemented POD#1  Patient was then D/C on  to Home  in stable condition. Patient was instructed on the use of pain medications, the signs and symptoms of infection, and was given our number to call should they have any questions or concerns following discharge.

## 2021-02-22 NOTE — PROGRESS NOTES
Physical Therapy  Facility/Department: [facility] MED SURG [id number]/[id number]  Physical Therapy Discharge      NAME: Jeff Maki    :  (67 y.o.)  MRN: [id number]    Account: [de-identified]  Gender: female      Patient has been discharged from acute care hospital. DC patient from current PT program.      Electronically signed by Sarah Baca PT on 21 at 4:51 PM EST

## 2021-07-06 PROBLEM — S63.649A RUPTURE OF ULNAR COLLATERAL LIGAMENT OF THUMB: Status: ACTIVE | Noted: 2021-07-06

## 2021-07-06 PROBLEM — M79.643 HAND PAIN: Status: ACTIVE | Noted: 2021-07-06

## 2021-07-12 RX ORDER — SERTRALINE HYDROCHLORIDE 100 MG/1
TABLET, FILM COATED ORAL
Qty: 90 TABLET | Refills: 3 | Status: SHIPPED | OUTPATIENT
Start: 2021-07-12 | End: 2022-07-07

## 2021-07-12 NOTE — TELEPHONE ENCOUNTER
Patient is requesting medication refill.  Please approve or deny this request.    Rx requested:  Requested Prescriptions     Pending Prescriptions Disp Refills    sertraline (ZOLOFT) 100 MG tablet 90 tablet 3     Sig: TAKE 1 TABLET DAILY         Last Office Visit:   1/13/2021      Next Visit Date:  Future Appointments   Date Time Provider Arnold Ibanez   7/19/2021 11:30 AM [address]   7/22/2021 11:30 AM Fidencio Hicks  [address]   9/20/2021  2:00 PM [facility] 3 MLOZ PAT [address] [place] [address]   10/25/2021  4:00 PM Alvaro Bailey MD [facility]

## 2021-07-19 ENCOUNTER — HOSPITAL ENCOUNTER (OUTPATIENT)
Dept: WOMENS IMAGING | Age: 66
Discharge: HOME | End: 2021-07-21
Payer: MEDICARE

## 2021-07-19 VITALS — BODY MASS INDEX: 38.55 KG/M2 | HEIGHT: 61 IN

## 2021-07-19 DIAGNOSIS — Z12.31 ENCOUNTER FOR SCREENING MAMMOGRAM FOR MALIGNANT NEOPLASM OF BREAST: ICD-10-CM

## 2021-07-19 PROCEDURE — 77063 BREAST TOMOSYNTHESIS BI: CPT

## 2021-07-20 DIAGNOSIS — E11.65 UNCONTROLLED TYPE 2 DIABETES MELLITUS WITH HYPERGLYCEMIA (HCC): ICD-10-CM

## 2021-07-20 DIAGNOSIS — E03.9 ACQUIRED HYPOTHYROIDISM: ICD-10-CM

## 2021-07-20 LAB
ANION GAP SERPL CALCULATED.3IONS-SCNC: 15 MEQ/L (ref 9–15)
BUN BLDV-MCNC: 17 MG/DL (ref 8–23)
CALCIUM SERPL-MCNC: 9.4 MG/DL (ref 8.5–9.9)
CHLORIDE BLD-SCNC: 105 MEQ/L (ref 95–107)
CO2: 23 MEQ/L (ref 20–31)
CREAT SERPL-MCNC: 0.61 MG/DL (ref 0.5–0.9)
GFR AFRICAN AMERICAN: >60
GFR NON-AFRICAN AMERICAN: >60
GLUCOSE BLD-MCNC: 109 MG/DL (ref 70–99)
HBA1C MFR BLD: 6.2 % (ref 4.8–5.9)
POTASSIUM SERPL-SCNC: 4.2 MEQ/L (ref 3.4–4.9)
SODIUM BLD-SCNC: 143 MEQ/L (ref 135–144)
T4 FREE: 1.4 NG/DL (ref 0.84–1.68)
TSH REFLEX: 1.34 UIU/ML (ref 0.44–3.86)

## 2021-07-22 ENCOUNTER — OFFICE VISIT (OUTPATIENT)
Dept: ENDOCRINOLOGY | Age: 66
End: 2021-07-22
Payer: MEDICARE

## 2021-07-22 VITALS
WEIGHT: 200 LBS | HEART RATE: 80 BPM | BODY MASS INDEX: 37.76 KG/M2 | OXYGEN SATURATION: 95 % | DIASTOLIC BLOOD PRESSURE: 76 MMHG | HEIGHT: 61 IN | SYSTOLIC BLOOD PRESSURE: 121 MMHG

## 2021-07-22 DIAGNOSIS — E66.01 SEVERE OBESITY (BMI 35.0-35.9 WITH COMORBIDITY) (HCC): ICD-10-CM

## 2021-07-22 DIAGNOSIS — E11.65 UNCONTROLLED TYPE 2 DIABETES MELLITUS WITH HYPERGLYCEMIA (HCC): Primary | ICD-10-CM

## 2021-07-22 DIAGNOSIS — E03.9 ACQUIRED HYPOTHYROIDISM: ICD-10-CM

## 2021-07-22 LAB
CHP ED QC CHECK: NORMAL
GLUCOSE BLD-MCNC: 171 MG/DL

## 2021-07-22 PROCEDURE — 82962 GLUCOSE BLOOD TEST: CPT | Performed by: INTERNAL MEDICINE

## 2021-07-22 PROCEDURE — 99213 OFFICE O/P EST LOW 20 MIN: CPT | Performed by: INTERNAL MEDICINE

## 2021-07-22 NOTE — PROGRESS NOTES
7/22/2021    Assessment:       Diagnosis Orders   1. Uncontrolled type 2 diabetes mellitus with hyperglycemia ([facility])  POCT Glucose    HM DIABETES FOOT EXAM    Hemoglobin L2B    Basic Metabolic Panel    Microalbumin / Creatinine Urine Ratio   2. Acquired hypothyroidism  T4, Free    TSH without Reflex   3. Severe obesity (BMI 35.0-35.9 with comorbidity) ([facility])           PLAN:     Orders Placed This Encounter   Procedures    Hemoglobin A1C     Standing Status:   Future     Standing Expiration Date:   7/22/2022    Basic Metabolic Panel     Standing Status:   Future     Standing Expiration Date:   7/22/2022    T4, Free     Standing Status:   Future     Standing Expiration Date:   7/22/2022    TSH without Reflex     Standing Status:   Future     Standing Expiration Date:   7/22/2022    Microalbumin / Creatinine Urine Ratio     Standing Status:   Future     Standing Expiration Date:   7/22/2022    POCT Glucose    HM DIABETES FOOT EXAM     Continue Metformin 500 mg twice a day continue Synthroid 100 mcg daily continue to follow-up in 3 to 6 months time cleared from endocrine for the surgery replacement      Subjective:     Chief Complaint   Patient presents with    Diabetes    Hypothyroidism     Vitals:    07/22/21 1153 07/22/21 1159   BP: (!) 129/56 121/76   Pulse: 80    SpO2: 95%    Weight: 200 lb (90.7 kg)    Height: 5' 1\" (1.549 m)      Wt Readings from Last 3 Encounters:   07/22/21 200 lb (90.7 kg)   02/19/21 204 lb (92.5 kg)   02/12/21 204 lb 12.8 oz (92.9 kg)     BP Readings from Last 3 Encounters:   07/22/21 121/76   02/20/21 (!) 129/57   02/19/21 (!) 95/47     Follow-up on type 2 diabetes hypothyroidism labs were done recently reviewed thyroid function test stable A1c also stable patient also scheduled for left knee replacement in September obesity BMI at 40    Diabetes  She presents for her follow-up diabetic visit. She has type 2 diabetes mellitus. Symptoms are stable.  Risk factors for coronary artery disease include obesity. Current diabetic treatment includes oral agent (monotherapy) (Metformin). Her overall blood glucose range is 110-130 mg/dl. (Lab Results       Component                Value               Date                       LABA1C                   6.2 (H)             07/20/2021              )        Results for Che Barnett (MRN [id number]) as of 7/22/2021 12:05   Ref.  Range 7/20/2021 10:11 7/22/2021 11:55   Sodium Latest Ref Range: 135 - 144 mEq/L 143    Potassium Latest Ref Range: 3.4 - 4.9 mEq/L 4.2    Chloride Latest Ref Range: 95 - 107 mEq/L 105    CO2 Latest Ref Range: 20 - 31 mEq/L 23    BUN Latest Ref Range: 8 - 23 mg/dL 17    Creatinine Latest Ref Range: 0.50 - 0.90 mg/dL 0.61    Anion Gap Latest Ref Range: 9 - 15 mEq/L 15    GFR Non- Latest Ref Range: >60  >60.0    GFR African American Latest Ref Range: >60  >60.0    Glucose Latest Units: mg/dL 109 (H) 171   Calcium Latest Ref Range: 8.5 - 9.9 mg/dL 9.4    Hemoglobin A1C Latest Ref Range: 4.8 - 5.9 % 6.2 (H)    TSH Latest Ref Range: 0.440 - 3.860 uIU/mL 1.340    T4 Free Latest Ref Range: 0.84 - 1.68 ng/dL 1.40          Past Medical History:   Diagnosis Date    [facility]     Cancer ([facility]     Depression     Hyperlipidemia     Hypertension     Thyroid disease     Type II diabetes mellitus, uncontrolled ([facility]      Past Surgical History:   Procedure Laterality Date    BREAST LUMPECTOMY Left 1996 [address] 1965    TOTAL KNEE ARTHROPLASTY Right 2/19/2021    RIGHT TOTAL KNEE ARTHROPLASTY WITH TIBIAL STEMS SUPINE; Truddie Code PERSONA TIBIAL STEM CONSTANTIN-NARCISO performed by Gutierrez Carrington MD at [address] [place] [address] History     Socioeconomic History    Marital status:      Spouse name: Not on file    Number of children: Not on file    Years of education: Not on file    Highest education level: Not on file   Occupational History    Not on file   Tobacco Use    Smoking status: Current Every Day Smoker     Packs/day: 1.00     Years: 40.00     Pack years: 40.00     Types: Cigarettes    Smokeless tobacco: Never Used    Tobacco comment: trying to cut back   Substance and Sexual Activity    Alcohol use: Never     Alcohol/week: 0.0 standard drinks    Drug use: Not Currently     Types: Marijuana    Sexual activity: Not on file   Other Topics Concern    Not on file   Social History Narrative    Not on file     Social Determinants of Health     Financial Resource Strain:     Difficulty of Paying Living Expenses:    Food Insecurity:     Worried About Running Out of Food in the Last Year:     Ran Out of Food in the Last Year:    Transportation Needs:     Lack of Transportation (Medical):      Lack of Transportation (Non-Medical):    Physical Activity:     Days of Exercise per Week:     Minutes of Exercise per Session:    Stress:     Feeling of Stress :    Social Connections:     Frequency of Communication with Friends and Family:     Frequency of Social Gatherings with Friends and Family:     Attends [employer] Services:     Active Member of Clubs or Organizations:     Attends Club or Organization Meetings:     Marital Status:    Intimate Partner Violence:     Fear of Current or Ex-Partner:     Emotionally Abused:     Physically Abused:     Sexually Abused:      Family History   Problem Relation Age of Onset    Heart Disease Mother     Cancer Father      No Known Allergies    Current Outpatient Medications:     sertraline (ZOLOFT) 100 MG tablet, TAKE 1 TABLET DAILY, Disp: 90 tablet, Rfl: 3    amLODIPine (NORVASC) 10 MG tablet, Once a day, Disp: 90 tablet, Rfl: 3    levothyroxine (SYNTHROID) 100 MCG tablet, Take 1 tablet by mouth Daily, Disp: 90 tablet, Rfl: 03    metFORMIN (GLUCOPHAGE) 500 MG tablet, TAKE 1 TABLET TWICE A DAY WITH MEALS, Disp: 180 tablet, Rfl: 3    atorvastatin (LIPITOR) 10 MG tablet, TAKE 1 TABLET DAILY, Disp: 90 tablet, Rfl: 3    blood glucose monitor strips, Test BG once daily, DX: E11.9, NIDDM, Disp: 50 strip, Rfl: 6    Lancets MISC, Test BG once daily, DX: E11.65, NIDDM, Disp: 100 each, Rfl: 3    Syringe/Needle, Disp, (SYRINGE 3CC/25GX1\") 25G X 1\" 3 ML MISC, Use for monthly B-12 injection, Disp: 10 each, Rfl: 3    Blood Glucose Monitoring Suppl MIGUEL ANGEL, Test BG once daily, DX: E11.65, NIDDM, Disp: 1 Device, Rfl: 0    Alcohol Swabs PADS, Bid, Disp: 100 each, Rfl: 06    aspirin 81 MG EC tablet, Take 1 tablet by mouth 2 times daily, Disp: 60 tablet, Rfl: 0    ibuprofen (ADVIL;MOTRIN) 800 MG tablet, Take 1 tablet by mouth every 6 hours as needed for Pain, Disp: 360 tablet, Rfl: 0  Lab Results   Component Value Date     07/20/2021    K 4.2 07/20/2021     07/20/2021    CO2 23 07/20/2021    BUN 17 07/20/2021    CREATININE 0.61 07/20/2021    GLUCOSE 171 07/22/2021    CALCIUM 9.4 07/20/2021    PROT 7.1 05/15/2019    LABALBU 4.2 05/15/2019    BILITOT 0.4 05/15/2019    ALKPHOS 54 05/15/2019    AST 10 05/15/2019    ALT 13 05/15/2019    LABGLOM >60.0 07/20/2021    GFRAA >60.0 07/20/2021     Lab Results   Component Value Date    WBC 7.2 02/20/2021    HGB 10.2 (L) 02/20/2021    HCT 31.2 (L) 02/20/2021    MCV 87.4 02/20/2021     02/20/2021     Lab Results   Component Value Date    LABA1C 6.2 (H) 07/20/2021    LABA1C 6.2 (H) 11/30/2020    LABA1C 6.5 (H) 05/18/2020     Lab Results   Component Value Date    HDL 37 (L) 11/30/2020    HDL 33 (L) 05/18/2020    HDL 34 (L) 11/21/2019    LDLCALC 54 11/30/2020    LDLCALC 86 05/18/2020    LDLCALC 45 11/21/2019    CHOL 126 11/30/2020    CHOL 171 05/18/2020    CHOL 121 11/21/2019    TRIG 176 (H) 11/30/2020    TRIG 261 (H) 05/18/2020    TRIG 208 (H) 11/21/2019       Lab Results   Component Value Date    TSH 1.730 11/30/2020    TSH 2.030 05/18/2020    TSH 2.160 11/21/2019    TSHREFLEX 1.340 07/20/2021    T4FREE 1.40 07/20/2021    T4FREE 1.30 11/30/2020    T4FREE 1.52 05/18/2020       Review of Systems Cardiovascular: Negative. Endocrine: Negative. Musculoskeletal: Positive for arthralgias. All other systems reviewed and are negative. Objective:   Physical Exam  Vitals reviewed. Constitutional:       Appearance: Normal appearance. She is obese. HENT:      Head: Normocephalic and atraumatic. Hair is normal.      Right Ear: External ear normal.      Left Ear: External ear normal.      Nose: Nose normal.   Eyes:      General: No scleral icterus. Right eye: No discharge. Left eye: No discharge. Extraocular Movements: Extraocular movements intact. Conjunctiva/sclera: Conjunctivae normal.   Neck:      Trachea: Trachea normal.   Cardiovascular:      Rate and Rhythm: Normal rate. Pulmonary:      Effort: Pulmonary effort is normal.   Musculoskeletal:         General: Normal range of motion. Cervical back: Normal range of motion and neck supple. Feet:    Neurological:      General: No focal deficit present. Mental Status: She is alert and oriented to person, place, and time.    Psychiatric:         Mood and Affect: Mood normal.         Behavior: Behavior normal.

## 2021-08-31 ENCOUNTER — HOSPITAL ENCOUNTER (OUTPATIENT)
Dept: PREADMISSION TESTING | Age: 66
Discharge: HOME | End: 2021-09-04
Payer: MEDICARE

## 2021-08-31 VITALS
RESPIRATION RATE: 16 BRPM | DIASTOLIC BLOOD PRESSURE: 46 MMHG | HEIGHT: 62 IN | OXYGEN SATURATION: 98 % | TEMPERATURE: 97.3 F | HEART RATE: 80 BPM | WEIGHT: 197 LBS | SYSTOLIC BLOOD PRESSURE: 138 MMHG | BODY MASS INDEX: 36.25 KG/M2

## 2021-08-31 LAB
ABO/RH: NORMAL
ANION GAP SERPL CALCULATED.3IONS-SCNC: 11 MEQ/L (ref 9–15)
ANTIBODY SCREEN: NORMAL
BACTERIA: NEGATIVE /HPF
BILIRUBIN URINE: NEGATIVE
BLOOD, URINE: NEGATIVE
BUN BLDV-MCNC: 16 MG/DL (ref 8–23)
CALCIUM SERPL-MCNC: 10 MG/DL (ref 8.5–9.9)
CHLORIDE BLD-SCNC: 104 MEQ/L (ref 95–107)
CLARITY: ABNORMAL
CO2: 26 MEQ/L (ref 20–31)
COLOR: ABNORMAL
CREAT SERPL-MCNC: 0.72 MG/DL (ref 0.5–0.9)
CRYSTALS, UA: ABNORMAL /HPF
EPITHELIAL CELLS, UA: ABNORMAL /HPF (ref 0–5)
GFR AFRICAN AMERICAN: >60
GFR NON-AFRICAN AMERICAN: >60
GLUCOSE BLD-MCNC: 175 MG/DL (ref 70–99)
GLUCOSE URINE: NEGATIVE MG/DL
HCT VFR BLD CALC: 37.4 % (ref 37–47)
HEMOGLOBIN: 12.4 G/DL (ref 12–16)
HYALINE CASTS: ABNORMAL /HPF (ref 0–5)
KETONES, URINE: 15 MG/DL
LEUKOCYTE ESTERASE, URINE: NEGATIVE
MCH RBC QN AUTO: 28.4 PG (ref 27–31.3)
MCHC RBC AUTO-ENTMCNC: 33.1 % (ref 33–37)
MCV RBC AUTO: 85.9 FL (ref 82–100)
NITRITE, URINE: NEGATIVE
PDW BLD-RTO: 14.3 % (ref 11.5–14.5)
PH UA: 5.5 (ref 5–9)
PLATELET # BLD: 237 K/UL (ref 130–400)
POTASSIUM SERPL-SCNC: 4.1 MEQ/L (ref 3.4–4.9)
PROTEIN UA: 30 MG/DL
RBC # BLD: 4.35 M/UL (ref 4.2–5.4)
RBC UA: ABNORMAL /HPF (ref 0–2)
SODIUM BLD-SCNC: 141 MEQ/L (ref 135–144)
SPECIFIC GRAVITY UA: 1.03 (ref 1–1.03)
URINE REFLEX TO CULTURE: ABNORMAL
UROBILINOGEN, URINE: 1 E.U./DL
WBC # BLD: 6.2 K/UL (ref 4.8–10.8)
WBC UA: ABNORMAL /HPF (ref 0–5)

## 2021-08-31 PROCEDURE — 81001 URINALYSIS AUTO W/SCOPE: CPT

## 2021-08-31 PROCEDURE — 93005 ELECTROCARDIOGRAM TRACING: CPT | Performed by: ORTHOPAEDIC SURGERY

## 2021-08-31 PROCEDURE — 87635 SARS-COV-2 COVID-19 AMP PRB: CPT

## 2021-08-31 PROCEDURE — 86850 RBC ANTIBODY SCREEN: CPT

## 2021-08-31 PROCEDURE — 80048 BASIC METABOLIC PNL TOTAL CA: CPT

## 2021-08-31 PROCEDURE — 85027 COMPLETE CBC AUTOMATED: CPT

## 2021-08-31 PROCEDURE — 86900 BLOOD TYPING SEROLOGIC ABO: CPT

## 2021-08-31 PROCEDURE — 86901 BLOOD TYPING SEROLOGIC RH(D): CPT

## 2021-09-01 LAB
EKG ATRIAL RATE: 83 BPM
EKG P AXIS: 64 DEGREES
EKG P-R INTERVAL: 156 MS
EKG Q-T INTERVAL: 378 MS
EKG QRS DURATION: 82 MS
EKG QTC CALCULATION (BAZETT): 444 MS
EKG R AXIS: 63 DEGREES
EKG T AXIS: 56 DEGREES
EKG VENTRICULAR RATE: 83 BPM
SARS-COV-2, PCR: NOT DETECTED

## 2021-09-01 PROCEDURE — 93010 ELECTROCARDIOGRAM REPORT: CPT | Performed by: INTERNAL MEDICINE

## 2021-09-02 ENCOUNTER — ANESTHESIA EVENT (OUTPATIENT)
Dept: OPERATING ROOM | Age: 66
End: 2021-09-02
Payer: MEDICARE

## 2021-09-02 RX ORDER — KETOROLAC TROMETHAMINE 15 MG/ML
15 INJECTION, SOLUTION INTRAMUSCULAR; INTRAVENOUS EVERY 6 HOURS
Status: CANCELLED | OUTPATIENT
Start: 2021-09-02 | End: 2021-09-03

## 2021-09-02 NOTE — H&P
[address]                      [address], [address] [facility]                              HISTORY AND PHYSICAL    PATIENT NAME: Lynda Clarke                   :        1955  MED REC NO:   [id number]                            ROOM:  ACCOUNT NO:   [de-identified]                           ADMIT DATE: 2021  PROVIDER:     Matilde Morales PA-C    DATE OF SERVICE:  2021    CHIEF COMPLAINT:  Left knee pain. HISTORY OF PRESENT ILLNESS:  The patient with known left knee  degenerative joint disease. She has been treating this conservatively,  however, conservative therapy is no longer providing relief. X-rays  confirmed severe degenerative joint disease. After the risks, benefits,  and alternatives were discussed, the patient elected to proceed with  left total knee replacement. This will be performed on 2021 at  [facility] in [facility] [place]. PAST MEDICAL HISTORY:  Significant for osteoarthritis, hypertension,  diabetes, hypothyroid, and seasonal allergies. CURRENT MEDICATIONS:  Amlodipine, atorvastatin, metformin, sertraline,  and Synthroid. ALLERGIES:  No known drug allergies. PAST SURGICAL HISTORY:  Significant for right total knee replacement. Denies any issues with anesthetic. SOCIAL HISTORY:  The patient denies current alcohol use, however, is a  lifelong smoker. FAMILY HISTORY:  Significant for osteoarthritis and coronary artery  disease. REVIEW OF SYSTEMS:  Noncontributory. PHYSICAL EXAMINATION:  GENERAL:  This is a 60-year-old  female. Height 5 feet 2  inches. HEENT:  Eyes:  Pupils equal, round, and reactive to light and  accommodation. Extraocular eye movements are intact. CHEST:  Lungs are clear to auscultation bilaterally. CARDIAC:  Regular rate and rhythm. No murmurs, rubs, or gallops  appreciated. ABDOMEN:  Obese, soft, and nontender. Normoactive bowel sounds x4  quadrants.   EXTREMITIES: Left knee:  Medial joint line tenderness. Mild positive  effusion. Mild positive Reena. Current range of motion is from 0 to  135 degrees. NEUROLOGIC:  CN II through XII are grossly intact. ASSESSMENT:  Left knee degenerative joint disease. PLAN:  Left total knee replacement. This will be performed on  09/03/2021 at [facility] in [facility].         Klever Alvarez    D: 09/02/2021 14:30:50       T: 09/02/2021 14:33:38     [email or web address]  Job#: [id number]     Doc#: [id number]    CC:

## 2021-09-03 ENCOUNTER — HOSPITAL ENCOUNTER (OUTPATIENT)
Age: 66
Setting detail: OBSERVATION
Discharge: HOME | End: 2021-09-04
Attending: ORTHOPAEDIC SURGERY | Admitting: ORTHOPAEDIC SURGERY
Payer: MEDICARE

## 2021-09-03 ENCOUNTER — ANESTHESIA (OUTPATIENT)
Dept: OPERATING ROOM | Age: 66
End: 2021-09-03
Payer: MEDICARE

## 2021-09-03 ENCOUNTER — APPOINTMENT (OUTPATIENT)
Dept: GENERAL RADIOLOGY | Age: 66
End: 2021-09-03
Attending: ORTHOPAEDIC SURGERY
Payer: MEDICARE

## 2021-09-03 VITALS
OXYGEN SATURATION: 99 % | DIASTOLIC BLOOD PRESSURE: 57 MMHG | SYSTOLIC BLOOD PRESSURE: 127 MMHG | RESPIRATION RATE: 14 BRPM

## 2021-09-03 PROBLEM — Z96.652 S/P TOTAL KNEE ARTHROPLASTY, LEFT: Status: ACTIVE | Noted: 2021-09-03

## 2021-09-03 LAB
GLUCOSE BLD-MCNC: 119 MG/DL (ref 60–115)
GLUCOSE BLD-MCNC: 126 MG/DL (ref 60–115)
GLUCOSE BLD-MCNC: 166 MG/DL (ref 60–115)
GLUCOSE BLD-MCNC: 171 MG/DL (ref 60–115)
PERFORMED ON: ABNORMAL

## 2021-09-03 PROCEDURE — 73560 X-RAY EXAM OF KNEE 1 OR 2: CPT

## 2021-09-03 PROCEDURE — 6370000000 HC RX 637 (ALT 250 FOR IP): Performed by: INTERNAL MEDICINE

## 2021-09-03 PROCEDURE — 2580000003 HC RX 258: Performed by: STUDENT IN AN ORGANIZED HEALTH CARE EDUCATION/TRAINING PROGRAM

## 2021-09-03 PROCEDURE — 2500000003 HC RX 250 WO HCPCS: Performed by: STUDENT IN AN ORGANIZED HEALTH CARE EDUCATION/TRAINING PROGRAM

## 2021-09-03 PROCEDURE — G0378 HOSPITAL OBSERVATION PER HR: HCPCS

## 2021-09-03 PROCEDURE — 3700000001 HC ADD 15 MINUTES (ANESTHESIA): Performed by: ORTHOPAEDIC SURGERY

## 2021-09-03 PROCEDURE — 94150 VITAL CAPACITY TEST: CPT

## 2021-09-03 PROCEDURE — 6370000000 HC RX 637 (ALT 250 FOR IP): Performed by: NURSE PRACTITIONER

## 2021-09-03 PROCEDURE — 64447 NJX AA&/STRD FEMORAL NRV IMG: CPT | Performed by: STUDENT IN AN ORGANIZED HEALTH CARE EDUCATION/TRAINING PROGRAM

## 2021-09-03 PROCEDURE — 2580000003 HC RX 258

## 2021-09-03 PROCEDURE — 6360000002 HC RX W HCPCS: Performed by: ORTHOPAEDIC SURGERY

## 2021-09-03 PROCEDURE — 97166 OT EVAL MOD COMPLEX 45 MIN: CPT

## 2021-09-03 PROCEDURE — 94761 N-INVAS EAR/PLS OXIMETRY MLT: CPT

## 2021-09-03 PROCEDURE — 3600000014 HC SURGERY LEVEL 4 ADDTL 15MIN: Performed by: ORTHOPAEDIC SURGERY

## 2021-09-03 PROCEDURE — C1776 JOINT DEVICE (IMPLANTABLE): HCPCS | Performed by: ORTHOPAEDIC SURGERY

## 2021-09-03 PROCEDURE — 97162 PT EVAL MOD COMPLEX 30 MIN: CPT

## 2021-09-03 PROCEDURE — 2580000003 HC RX 258: Performed by: ORTHOPAEDIC SURGERY

## 2021-09-03 PROCEDURE — 7100000000 HC PACU RECOVERY - FIRST 15 MIN: Performed by: ORTHOPAEDIC SURGERY

## 2021-09-03 PROCEDURE — 2580000003 HC RX 258: Performed by: NURSE PRACTITIONER

## 2021-09-03 PROCEDURE — 6360000002 HC RX W HCPCS: Performed by: NURSE PRACTITIONER

## 2021-09-03 PROCEDURE — 7100000001 HC PACU RECOVERY - ADDTL 15 MIN: Performed by: ORTHOPAEDIC SURGERY

## 2021-09-03 PROCEDURE — 3700000000 HC ANESTHESIA ATTENDED CARE: Performed by: ORTHOPAEDIC SURGERY

## 2021-09-03 PROCEDURE — C1713 ANCHOR/SCREW BN/BN,TIS/BN: HCPCS | Performed by: ORTHOPAEDIC SURGERY

## 2021-09-03 PROCEDURE — 2709999900 HC NON-CHARGEABLE SUPPLY: Performed by: ORTHOPAEDIC SURGERY

## 2021-09-03 PROCEDURE — 6360000002 HC RX W HCPCS: Performed by: STUDENT IN AN ORGANIZED HEALTH CARE EDUCATION/TRAINING PROGRAM

## 2021-09-03 PROCEDURE — 3600000004 HC SURGERY LEVEL 4 BASE: Performed by: ORTHOPAEDIC SURGERY

## 2021-09-03 DEVICE — COMPONENT FEM SZ 7 NAR L KNEE CO CHROM CEM POST STBL COR: Type: IMPLANTABLE DEVICE | Site: KNEE | Status: FUNCTIONAL

## 2021-09-03 DEVICE — DUP USE 338453 IMPL KNEE PSN ALL POLY PAT PLY 29MM: Type: IMPLANTABLE DEVICE | Site: KNEE | Status: FUNCTIONAL

## 2021-09-03 DEVICE — SYSTEM TOT KNEE CEM FEM TIB COMP STD TIB INSRT STD PAT: Type: IMPLANTABLE DEVICE | Site: KNEE | Status: FUNCTIONAL

## 2021-09-03 DEVICE — CEMENT BNE 40GM HI VISC RADPQ FOR REV SURG: Type: IMPLANTABLE DEVICE | Site: KNEE | Status: FUNCTIONAL

## 2021-09-03 DEVICE — PSN TIB STM 5 DEG SZ D L: Type: IMPLANTABLE DEVICE | Site: KNEE | Status: FUNCTIONAL

## 2021-09-03 DEVICE — COMPONENT ARTC SURF PS 6-9 CD 12 MM LT TIB FIX BEAR: Type: IMPLANTABLE DEVICE | Site: KNEE | Status: FUNCTIONAL

## 2021-09-03 RX ORDER — HYDROCODONE BITARTRATE AND ACETAMINOPHEN 5; 325 MG/1; MG/1
2 TABLET ORAL PRN
Status: DISCONTINUED | OUTPATIENT
Start: 2021-09-03 | End: 2021-09-03 | Stop reason: HOSPADM

## 2021-09-03 RX ORDER — DEXTROSE MONOHYDRATE 25 G/50ML
12.5 INJECTION, SOLUTION INTRAVENOUS PRN
Status: DISCONTINUED | OUTPATIENT
Start: 2021-09-03 | End: 2021-09-04 | Stop reason: HOSPADM

## 2021-09-03 RX ORDER — LEVOTHYROXINE SODIUM 0.1 MG/1
100 TABLET ORAL DAILY
Status: DISCONTINUED | OUTPATIENT
Start: 2021-09-03 | End: 2021-09-04 | Stop reason: HOSPADM

## 2021-09-03 RX ORDER — HYDROCODONE BITARTRATE AND ACETAMINOPHEN 5; 325 MG/1; MG/1
TABLET ORAL
COMMUNITY
Start: 2021-09-02 | End: 2022-07-20

## 2021-09-03 RX ORDER — AMLODIPINE BESYLATE 10 MG/1
10 TABLET ORAL DAILY
Status: DISCONTINUED | OUTPATIENT
Start: 2021-09-03 | End: 2021-09-04 | Stop reason: HOSPADM

## 2021-09-03 RX ORDER — SODIUM CHLORIDE 0.9 % (FLUSH) 0.9 %
10 SYRINGE (ML) INJECTION EVERY 12 HOURS SCHEDULED
Status: DISCONTINUED | OUTPATIENT
Start: 2021-09-03 | End: 2021-09-04 | Stop reason: HOSPADM

## 2021-09-03 RX ORDER — MIDAZOLAM HYDROCHLORIDE 2 MG/2ML
INJECTION, SOLUTION INTRAMUSCULAR; INTRAVENOUS PRN
Status: DISCONTINUED | OUTPATIENT
Start: 2021-09-03 | End: 2021-09-03 | Stop reason: SDUPTHER

## 2021-09-03 RX ORDER — HYDROCODONE BITARTRATE AND ACETAMINOPHEN 5; 325 MG/1; MG/1
1 TABLET ORAL PRN
Status: DISCONTINUED | OUTPATIENT
Start: 2021-09-03 | End: 2021-09-03 | Stop reason: HOSPADM

## 2021-09-03 RX ORDER — CELECOXIB 200 MG/1
200 CAPSULE ORAL ONCE
Status: COMPLETED | OUTPATIENT
Start: 2021-09-03 | End: 2021-09-03

## 2021-09-03 RX ORDER — BUPIVACAINE HYDROCHLORIDE 5 MG/ML
INJECTION, SOLUTION EPIDURAL; INTRACAUDAL PRN
Status: DISCONTINUED | OUTPATIENT
Start: 2021-09-03 | End: 2021-09-03 | Stop reason: SDUPTHER

## 2021-09-03 RX ORDER — MEPERIDINE HYDROCHLORIDE 25 MG/ML
12.5 INJECTION INTRAMUSCULAR; INTRAVENOUS; SUBCUTANEOUS EVERY 5 MIN PRN
Status: DISCONTINUED | OUTPATIENT
Start: 2021-09-03 | End: 2021-09-03 | Stop reason: HOSPADM

## 2021-09-03 RX ORDER — MAGNESIUM HYDROXIDE 1200 MG/15ML
LIQUID ORAL CONTINUOUS PRN
Status: COMPLETED | OUTPATIENT
Start: 2021-09-03 | End: 2021-09-03

## 2021-09-03 RX ORDER — MAGNESIUM HYDROXIDE 1200 MG/15ML
LIQUID ORAL PRN
Status: DISCONTINUED | OUTPATIENT
Start: 2021-09-03 | End: 2021-09-03 | Stop reason: ALTCHOICE

## 2021-09-03 RX ORDER — DEXTROSE MONOHYDRATE 50 MG/ML
100 INJECTION, SOLUTION INTRAVENOUS PRN
Status: DISCONTINUED | OUTPATIENT
Start: 2021-09-03 | End: 2021-09-04 | Stop reason: HOSPADM

## 2021-09-03 RX ORDER — METOCLOPRAMIDE HYDROCHLORIDE 5 MG/ML
10 INJECTION INTRAMUSCULAR; INTRAVENOUS
Status: DISCONTINUED | OUTPATIENT
Start: 2021-09-03 | End: 2021-09-03 | Stop reason: HOSPADM

## 2021-09-03 RX ORDER — SODIUM CHLORIDE 9 MG/ML
25 INJECTION, SOLUTION INTRAVENOUS PRN
Status: DISCONTINUED | OUTPATIENT
Start: 2021-09-03 | End: 2021-09-04 | Stop reason: HOSPADM

## 2021-09-03 RX ORDER — ONDANSETRON 4 MG/1
4 TABLET, ORALLY DISINTEGRATING ORAL EVERY 8 HOURS PRN
Status: DISCONTINUED | OUTPATIENT
Start: 2021-09-03 | End: 2021-09-04 | Stop reason: HOSPADM

## 2021-09-03 RX ORDER — LIDOCAINE HYDROCHLORIDE 10 MG/ML
1 INJECTION, SOLUTION EPIDURAL; INFILTRATION; INTRACAUDAL; PERINEURAL
Status: COMPLETED | OUTPATIENT
Start: 2021-09-03 | End: 2021-09-03

## 2021-09-03 RX ORDER — TRANEXAMIC ACID 650 1/1
1950 TABLET ORAL ONCE
Status: COMPLETED | OUTPATIENT
Start: 2021-09-03 | End: 2021-09-03

## 2021-09-03 RX ORDER — ROPIVACAINE HYDROCHLORIDE 5 MG/ML
INJECTION, SOLUTION EPIDURAL; INFILTRATION; PERINEURAL
Status: COMPLETED | OUTPATIENT
Start: 2021-09-03 | End: 2021-09-03

## 2021-09-03 RX ORDER — ACETAMINOPHEN 325 MG/1
650 TABLET ORAL EVERY 6 HOURS
Status: DISCONTINUED | OUTPATIENT
Start: 2021-09-03 | End: 2021-09-04 | Stop reason: HOSPADM

## 2021-09-03 RX ORDER — ACETAMINOPHEN 500 MG
1000 TABLET ORAL ONCE
Status: COMPLETED | OUTPATIENT
Start: 2021-09-03 | End: 2021-09-03

## 2021-09-03 RX ORDER — FENTANYL CITRATE 50 UG/ML
50 INJECTION, SOLUTION INTRAMUSCULAR; INTRAVENOUS EVERY 10 MIN PRN
Status: DISCONTINUED | OUTPATIENT
Start: 2021-09-03 | End: 2021-09-03 | Stop reason: HOSPADM

## 2021-09-03 RX ORDER — ONDANSETRON 2 MG/ML
4 INJECTION INTRAMUSCULAR; INTRAVENOUS
Status: DISCONTINUED | OUTPATIENT
Start: 2021-09-03 | End: 2021-09-03 | Stop reason: HOSPADM

## 2021-09-03 RX ORDER — OXYCODONE HYDROCHLORIDE 5 MG/1
10 TABLET ORAL EVERY 4 HOURS PRN
Status: DISCONTINUED | OUTPATIENT
Start: 2021-09-03 | End: 2021-09-04 | Stop reason: HOSPADM

## 2021-09-03 RX ORDER — TRANEXAMIC ACID 650 1/1
1950 TABLET ORAL ONCE
Status: COMPLETED | OUTPATIENT
Start: 2021-09-04 | End: 2021-09-04

## 2021-09-03 RX ORDER — SODIUM CHLORIDE 9 MG/ML
25 INJECTION, SOLUTION INTRAVENOUS PRN
Status: DISCONTINUED | OUTPATIENT
Start: 2021-09-03 | End: 2021-09-03 | Stop reason: HOSPADM

## 2021-09-03 RX ORDER — SODIUM CHLORIDE 0.9 % (FLUSH) 0.9 %
10 SYRINGE (ML) INJECTION PRN
Status: DISCONTINUED | OUTPATIENT
Start: 2021-09-03 | End: 2021-09-04 | Stop reason: HOSPADM

## 2021-09-03 RX ORDER — NICOTINE 21 MG/24HR
1 PATCH, TRANSDERMAL 24 HOURS TRANSDERMAL DAILY
Status: DISCONTINUED | OUTPATIENT
Start: 2021-09-03 | End: 2021-09-04 | Stop reason: HOSPADM

## 2021-09-03 RX ORDER — SODIUM CHLORIDE, SODIUM LACTATE, POTASSIUM CHLORIDE, CALCIUM CHLORIDE 600; 310; 30; 20 MG/100ML; MG/100ML; MG/100ML; MG/100ML
INJECTION, SOLUTION INTRAVENOUS CONTINUOUS
Status: ACTIVE | OUTPATIENT
Start: 2021-09-03 | End: 2021-09-04

## 2021-09-03 RX ORDER — SODIUM CHLORIDE 0.9 % (FLUSH) 0.9 %
10 SYRINGE (ML) INJECTION PRN
Status: DISCONTINUED | OUTPATIENT
Start: 2021-09-03 | End: 2021-09-03 | Stop reason: HOSPADM

## 2021-09-03 RX ORDER — DIAZEPAM 2 MG/1
2 TABLET ORAL EVERY 6 HOURS PRN
Status: DISCONTINUED | OUTPATIENT
Start: 2021-09-03 | End: 2021-09-04 | Stop reason: HOSPADM

## 2021-09-03 RX ORDER — SODIUM CHLORIDE, SODIUM LACTATE, POTASSIUM CHLORIDE, CALCIUM CHLORIDE 600; 310; 30; 20 MG/100ML; MG/100ML; MG/100ML; MG/100ML
INJECTION, SOLUTION INTRAVENOUS
Status: COMPLETED
Start: 2021-09-03 | End: 2021-09-03

## 2021-09-03 RX ORDER — PROPOFOL 10 MG/ML
INJECTION, EMULSION INTRAVENOUS CONTINUOUS PRN
Status: DISCONTINUED | OUTPATIENT
Start: 2021-09-03 | End: 2021-09-03 | Stop reason: SDUPTHER

## 2021-09-03 RX ORDER — DIPHENHYDRAMINE HYDROCHLORIDE 50 MG/ML
12.5 INJECTION INTRAMUSCULAR; INTRAVENOUS
Status: DISCONTINUED | OUTPATIENT
Start: 2021-09-03 | End: 2021-09-03 | Stop reason: HOSPADM

## 2021-09-03 RX ORDER — OXYCODONE HYDROCHLORIDE 5 MG/1
5 TABLET ORAL EVERY 4 HOURS PRN
Status: DISCONTINUED | OUTPATIENT
Start: 2021-09-03 | End: 2021-09-04 | Stop reason: HOSPADM

## 2021-09-03 RX ORDER — SENNA AND DOCUSATE SODIUM 50; 8.6 MG/1; MG/1
1 TABLET, FILM COATED ORAL 2 TIMES DAILY
Status: DISCONTINUED | OUTPATIENT
Start: 2021-09-03 | End: 2021-09-04 | Stop reason: HOSPADM

## 2021-09-03 RX ORDER — MORPHINE SULFATE 2 MG/ML
2 INJECTION, SOLUTION INTRAMUSCULAR; INTRAVENOUS
Status: DISCONTINUED | OUTPATIENT
Start: 2021-09-03 | End: 2021-09-04 | Stop reason: HOSPADM

## 2021-09-03 RX ORDER — ATORVASTATIN CALCIUM 10 MG/1
10 TABLET, FILM COATED ORAL NIGHTLY
Status: DISCONTINUED | OUTPATIENT
Start: 2021-09-03 | End: 2021-09-04 | Stop reason: HOSPADM

## 2021-09-03 RX ORDER — ASPIRIN 81 MG/1
81 TABLET ORAL 2 TIMES DAILY
Status: DISCONTINUED | OUTPATIENT
Start: 2021-09-03 | End: 2021-09-04 | Stop reason: HOSPADM

## 2021-09-03 RX ORDER — SODIUM CHLORIDE, SODIUM LACTATE, POTASSIUM CHLORIDE, CALCIUM CHLORIDE 600; 310; 30; 20 MG/100ML; MG/100ML; MG/100ML; MG/100ML
INJECTION, SOLUTION INTRAVENOUS CONTINUOUS PRN
Status: DISCONTINUED | OUTPATIENT
Start: 2021-09-03 | End: 2021-09-03 | Stop reason: SDUPTHER

## 2021-09-03 RX ORDER — SERTRALINE HYDROCHLORIDE 100 MG/1
100 TABLET, FILM COATED ORAL DAILY
Status: DISCONTINUED | OUTPATIENT
Start: 2021-09-03 | End: 2021-09-04 | Stop reason: HOSPADM

## 2021-09-03 RX ORDER — KETOROLAC TROMETHAMINE 15 MG/ML
15 INJECTION, SOLUTION INTRAMUSCULAR; INTRAVENOUS EVERY 6 HOURS
Status: COMPLETED | OUTPATIENT
Start: 2021-09-03 | End: 2021-09-03

## 2021-09-03 RX ORDER — OXYCODONE HCL 10 MG/1
10 TABLET, FILM COATED, EXTENDED RELEASE ORAL ONCE
Status: COMPLETED | OUTPATIENT
Start: 2021-09-03 | End: 2021-09-03

## 2021-09-03 RX ORDER — NICOTINE POLACRILEX 4 MG
15 LOZENGE BUCCAL PRN
Status: DISCONTINUED | OUTPATIENT
Start: 2021-09-03 | End: 2021-09-04 | Stop reason: HOSPADM

## 2021-09-03 RX ORDER — SODIUM CHLORIDE, SODIUM LACTATE, POTASSIUM CHLORIDE, CALCIUM CHLORIDE 600; 310; 30; 20 MG/100ML; MG/100ML; MG/100ML; MG/100ML
INJECTION, SOLUTION INTRAVENOUS CONTINUOUS
Status: DISCONTINUED | OUTPATIENT
Start: 2021-09-03 | End: 2021-09-04 | Stop reason: HOSPADM

## 2021-09-03 RX ORDER — SODIUM CHLORIDE 0.9 % (FLUSH) 0.9 %
10 SYRINGE (ML) INJECTION EVERY 12 HOURS SCHEDULED
Status: DISCONTINUED | OUTPATIENT
Start: 2021-09-03 | End: 2021-09-03 | Stop reason: HOSPADM

## 2021-09-03 RX ORDER — ONDANSETRON 2 MG/ML
4 INJECTION INTRAMUSCULAR; INTRAVENOUS EVERY 6 HOURS PRN
Status: DISCONTINUED | OUTPATIENT
Start: 2021-09-03 | End: 2021-09-04 | Stop reason: HOSPADM

## 2021-09-03 RX ADMIN — TRANEXAMIC ACID 1950 MG: 650 TABLET ORAL at 17:30

## 2021-09-03 RX ADMIN — CELECOXIB 200 MG: 200 CAPSULE ORAL at 08:07

## 2021-09-03 RX ADMIN — ASPIRIN 81 MG: 81 TABLET, COATED ORAL at 21:13

## 2021-09-03 RX ADMIN — ROPIVACAINE HYDROCHLORIDE 30 ML: 5 INJECTION, SOLUTION EPIDURAL; INFILTRATION; PERINEURAL at 09:38

## 2021-09-03 RX ADMIN — ACETAMINOPHEN 650 MG: 325 TABLET ORAL at 12:23

## 2021-09-03 RX ADMIN — SODIUM CHLORIDE, SODIUM LACTATE, POTASSIUM CHLORIDE, CALCIUM CHLORIDE: 600; 310; 30; 20 INJECTION, SOLUTION INTRAVENOUS at 08:08

## 2021-09-03 RX ADMIN — PROPOFOL 70 MCG/KG/MIN: 10 INJECTION, EMULSION INTRAVENOUS at 09:37

## 2021-09-03 RX ADMIN — ATORVASTATIN CALCIUM 10 MG: 10 TABLET, FILM COATED ORAL at 21:12

## 2021-09-03 RX ADMIN — ACETAMINOPHEN 650 MG: 325 TABLET ORAL at 19:01

## 2021-09-03 RX ADMIN — SODIUM CHLORIDE, POTASSIUM CHLORIDE, SODIUM LACTATE AND CALCIUM CHLORIDE: 600; 310; 30; 20 INJECTION, SOLUTION INTRAVENOUS at 08:03

## 2021-09-03 RX ADMIN — SODIUM CHLORIDE, POTASSIUM CHLORIDE, SODIUM LACTATE AND CALCIUM CHLORIDE: 600; 310; 30; 20 INJECTION, SOLUTION INTRAVENOUS at 08:08

## 2021-09-03 RX ADMIN — KETOROLAC TROMETHAMINE 15 MG: 15 INJECTION, SOLUTION INTRAMUSCULAR; INTRAVENOUS at 12:24

## 2021-09-03 RX ADMIN — MIDAZOLAM HYDROCHLORIDE 2 MG: 1 INJECTION, SOLUTION INTRAMUSCULAR; INTRAVENOUS at 09:23

## 2021-09-03 RX ADMIN — ONDANSETRON 4 MG: 2 INJECTION INTRAMUSCULAR; INTRAVENOUS at 13:41

## 2021-09-03 RX ADMIN — TRANEXAMIC ACID 1950 MG: 650 TABLET ORAL at 08:07

## 2021-09-03 RX ADMIN — CEFAZOLIN 2000 MG: 10 INJECTION, POWDER, FOR SOLUTION INTRAVENOUS at 17:30

## 2021-09-03 RX ADMIN — BUPIVACAINE HYDROCHLORIDE 10 MG: 5 INJECTION, SOLUTION EPIDURAL; INTRACAUDAL at 09:30

## 2021-09-03 RX ADMIN — LEVOTHYROXINE SODIUM 100 MCG: 0.1 TABLET ORAL at 12:24

## 2021-09-03 RX ADMIN — KETOROLAC TROMETHAMINE 15 MG: 15 INJECTION, SOLUTION INTRAMUSCULAR; INTRAVENOUS at 19:00

## 2021-09-03 RX ADMIN — SERTRALINE 100 MG: 100 TABLET, FILM COATED ORAL at 15:39

## 2021-09-03 RX ADMIN — CEFAZOLIN SODIUM 2000 MG: 10 INJECTION, POWDER, FOR SOLUTION INTRAVENOUS at 09:32

## 2021-09-03 RX ADMIN — ACETAMINOPHEN 1000 MG: 500 TABLET ORAL at 08:07

## 2021-09-03 RX ADMIN — LIDOCAINE HYDROCHLORIDE 0.1 ML: 10 INJECTION, SOLUTION EPIDURAL; INFILTRATION; INTRACAUDAL; PERINEURAL at 08:06

## 2021-09-03 RX ADMIN — OXYCODONE HYDROCHLORIDE 10 MG: 10 TABLET, FILM COATED, EXTENDED RELEASE ORAL at 08:07

## 2021-09-03 ASSESSMENT — PULMONARY FUNCTION TESTS
PIF_VALUE: 0
PIF_VALUE: 1
PIF_VALUE: 0
PIF_VALUE: 1
PIF_VALUE: 0
PIF_VALUE: 1
PIF_VALUE: 1
PIF_VALUE: 0
PIF_VALUE: 1
PIF_VALUE: 0
PIF_VALUE: 1
PIF_VALUE: 0
PIF_VALUE: 1
PIF_VALUE: 0
PIF_VALUE: 1
PIF_VALUE: 0
PIF_VALUE: 1
PIF_VALUE: 1
PIF_VALUE: 0
PIF_VALUE: 1
PIF_VALUE: 0
PIF_VALUE: 1
PIF_VALUE: 0
PIF_VALUE: 1
PIF_VALUE: 1
PIF_VALUE: 0
PIF_VALUE: 0
PIF_VALUE: 1
PIF_VALUE: 0
PIF_VALUE: 1
PIF_VALUE: 1
PIF_VALUE: 0
PIF_VALUE: 0
PIF_VALUE: 1
PIF_VALUE: 0
PIF_VALUE: 1
PIF_VALUE: 0
PIF_VALUE: 1
PIF_VALUE: 1
PIF_VALUE: 0
PIF_VALUE: 0
PIF_VALUE: 1
PIF_VALUE: 1
PIF_VALUE: 0
PIF_VALUE: 1
PIF_VALUE: 1

## 2021-09-03 ASSESSMENT — PAIN SCALES - GENERAL
PAINLEVEL_OUTOF10: 0
PAINLEVEL_OUTOF10: 0
PAINLEVEL_OUTOF10: 1
PAINLEVEL_OUTOF10: 0

## 2021-09-03 ASSESSMENT — PAIN DESCRIPTION - LOCATION: LOCATION: KNEE

## 2021-09-03 ASSESSMENT — PAIN DESCRIPTION - PAIN TYPE: TYPE: SURGICAL PAIN

## 2021-09-03 ASSESSMENT — PAIN DESCRIPTION - ORIENTATION: ORIENTATION: LEFT

## 2021-09-03 ASSESSMENT — LIFESTYLE VARIABLES: SMOKING_STATUS: 1

## 2021-09-03 NOTE — PROGRESS NOTES
Physical Therapy Med Surg Initial Assessment  Fac[facility]Department: [facility]  Room: N222/N222-01       NAME: Jorge Sandifer  : [birth year] (30 y.o.)  MRN: [id number]  CODE STATUS: Full Code    Date of Service: 9/3/2021    Patient Diagnosis(es): S/P total knee arthroplasty, left [Z96.652]   No chief complaint on file.     Patient Active Problem List    Diagnosis Date Noted    S/P total knee arthroplasty, left 2021    Hand pain 2021    Rupture of ulnar collateral ligament of thumb 2021    Status post total knee replacement, right 2021    Ataxic gait 2020    History of diabetes mellitus 09/15/2020    Knee pain 09/15/2020    Personal history of malignant neoplasm 09/15/2020    Primary osteoarthritis of both knees 09/15/2020    Shoulder joint pain 09/15/2020    Radicular pain in right arm 2020    Cervical radiculopathy 2020    Adhesive capsulitis of both shoulders 2017    Herniated nucleus pulposus, C5-6 2017    Herniated nucleus pulposus, C6-7 left 2017    Smoker 2017    Uncontrolled diabetes mellitus type 2 without complications     Obesity 2016    Vitamin B12 deficiency 2016    DM2 (diabetes mellitus, type 2) ([address] 2015    HTN (hypertension) 2015    Hypothyroid 2015    Hyperlipemia, mixed     Metabolic syndrome     Ingrowing nail 2009        Past Medical History:   Diagnosis Date    Breast cancer ([address]     Cancer ([address]     Depression     Hyperlipidemia     Hypertension     Thyroid disease     Type II diabetes mellitus, uncontrolled ([address]      Past Surgical History:   Procedure Laterality Date    BREAST LUMPECTOMY Left      SECTION       SECTION      TOTAL KNEE ARTHROPLASTY Right 2021    RIGHT TOTAL KNEE ARTHROPLASTY WITH TIBIAL STEMS SUPINE; CONSTANTIN PERSONA TIBIAL STEM CONSTANTIN-NARCISO performed by Noa Wright MD at [facility]       Chart Reviewed: Yes  Patient assessed for rehabilitation services?: Yes  Family / Caregiver Present: No    Restrictions:  Restrictions/Precautions: Weight Bearing, Fall Risk  Lower Extremity Weight Bearing Restrictions  Left Lower Extremity Weight Bearing: Weight Bearing As Tolerated  Position Activity Restriction  Other position/activity restrictions: L knee immobilizer until +SLR     SUBJECTIVE:      Pain  Pre Treatment Pain Screening  Pain at present: 0  Intervention List: Patient able to continue with treatment    Post Treatment Pain Screening:   Pain Screening  Patient Currently in Pain: Denies  Pain Assessment  Pain Level: 0    Prior Level of Function:  Social/Functional History  Lives With: Spouse, Family  Type of Home: House  Home Layout: One level, Laundry in basement  Home Access: Stairs to enter with rails  Entrance Stairs - Number of Steps: 4  Entrance Stairs - Rails: Left  Bathroom Shower/Tub: Tub/Shower unit  Bathroom Equipment: Shower chair, Grab bars in shower, 3-in-1 commode  Home Equipment: Rolling walker, Cane  ADL Assistance: Independent  Homemaking Assistance: Independent  Homemaking Responsibilities: Yes  Ambulation Assistance: Independent (no AD)  Transfer Assistance: Independent  Active : Yes    OBJECTIVE:   Vision: Within Functional Limits  Hearing: Within functional limits    Cognition:  Overall Orientation Status: Within Functional Limits  Follows Commands: Within Functional Limits    Observation/Palpation  Observation: L knee immobilizer adjusted for fit; IV running; pt began vomiting during session- RN made aware; mild c/o dizziness    ROM:  RLE AROM: WFL  LLE AROM : WFL (L knee NT due to ace wrap donned)    Strength:  Strength RLE  Comment: grossly 4/5  Strength LLE  Comment: grossly 4-/5    Neuro:  Balance  Posture: Fair  Sitting - Static: Good  Sitting - Dynamic: Good  Standing - Static: Fair;-  Standing - Dynamic: Fair;- (pt requires B UE support for steadiness) Modalities, Balance Training, Endurance Training, Stair training, Pain Management, Patient/Caregiver Education & Training, Positioning  Safety Devices  Type of devices: Call light within reach, Chair alarm in place, Left in chair    Goals:  Short term goals  Short term goal 1: Pt will demonstrate HEP indep  Short term goal 2: Pt will demonstrate L knee AROM 0-90 deg  Long term goals  Long term goal 1: Pt will demonstrate bed mobility indep. Long term goal 2: Pt will demonstrate transfers mod indep with safest AD  Long term goal 3: Pt will demonstrate amb mod indep with safest AD 100ft  Long term goal 4: Pt will demonstrate stair negotiation 4 steps with 1 rail mod indep    [facility] (6 CLICK) BASIC MOBILITY  AM-PAC Inpatient Mobility Raw Score : 17    Therapy Time:   Individual   Time In 1329   Time Out 1348   Arnold Eastman PT, 09/03/21 at 2:12 PM         Definitions for assistance levels  Independent = pt does not require any physical supervision or assistance from another person for activity completion. Device may be needed.   Stand by assistance = pt requires verbal cues or instructions from another person, close to but not touching, to perform the activity  Minimal assistance= pt performs 75% or more of the activity; assistance is required to complete the activity  Moderate assistance= pt performs 50% of the activity; assistance is required to complete the activity  Maximal assistance = pt performs 25% of the activity; assistance is required to complete the activity  Dependent = pt requires total physical assistance to accomplish the task

## 2021-09-03 NOTE — H&P
Interval History and Physical    I have interviewed and examined the patient and reviewed the recent History and Physical.  There have been no changes to the recent H&P documentation. No change in ROS or PE. Pt's allergies reviewed and no change List of meds on original H&P    No significant findings with ROS, family hx, social  Hx, ALL, surgical hx, med list or medical history     The patient understands the planned operation and its associated risks and benefits and agrees to proceed. The surgical consent form has been signed. There were no vitals taken for this visit.      Electronically signed by Andie Joy MD on 9/3/2021 at 7:26 AM

## 2021-09-03 NOTE — OP NOTE
[address]                      [address], [address] [facility]                                OPERATIVE REPORT    PATIENT NAME: Kenny Latif                   :        1955  MED REC NO:   [id number]                            ROOM:       N222  ACCOUNT NO:   [de-identified]                           ADMIT DATE: 2021  PROVIDER:     Ivan Howard MD    DATE OF PROCEDURE:  2021    PREOPERATIVE DIAGNOSIS:  Left knee arthritis. POSTOPERATIVE DIAGNOSES:  Left knee arthritis, varus deformity. OPERATION PERFORMED:  Left total knee replacement. SURGEON:  Ivan Howard MD    ASSISTANT:  Mekhi Paz PA-C was present throughout the entire case. Given the nature of the disease process and the procedure, a skilled  surgical first assistant was necessary during the case. The assistant  was necessary to hold retractors and manipulate the extremity during the  procedure. A certified scrub tech was at the back table managing the  instruments and supplies for the surgical case. ANESTHESIA:  Spinal with a regional block. BLOOD LOSS:  Minimal.    FLUIDS:  Less than 3 L.    COMPLICATIONS:  None    OPERATION AND FINDINGS:  The patient was brought to the operating room  and a surgical time-out was performed. The patient received  preoperative antibiotics. After satisfactory anesthetic, the  appropriate knee was prepped and draped in the usual sterile fashion. A medial prepatellar incision was made and dissection carried sharply  through the skin and underlying fat to the fascial tissue. A median  capsular incision was made and the joint entered. There was a modest  amount of synovial fluid, the synovium was hypertrophic. A drill hole was made in the center of the distal femur and the  intramedullary guide inserted. The cutting jig was then inserted with 3  degrees of external rotation and the distal femoral cut was made.   The  jigs and augustin were removed and the appropriate sized template inserted. The anterior and posterior condyles were cut with an oscillating saw and  chamfer cuts were made. The patella was then identified and a reamer  was used to make the appropriate cut on the patella. The proximal tibia  was sized with a heartsizing plate and a punch used to make a hole for  the prosthesis in the tibia. A drill hole was placed in the center of  the tibia. Tibial resection was performed with a 7-degree posterior slope. The  tibial trial was applied and reaming and broaching performed. Extramedullary alignment was checked. Trials were inserted, approximate  sizes were determined and the ligament balancing was performed. A batch  of cement was mixed and after lavage and careful bone drying the  prostheses were inserted and held in full extension until the cement  hardened with a patella clamp in place. The ligaments were checked and  were stable. The wound was then closed in layers using #0 Vicryl for the fascia, #000  Monocryl for the subcutaneous tissues and staples for the skin. A dry  sterile bulky dressing applied. The patient tolerated the procedure  well and returned to the PACU in satisfactory condition. A postoperative x-ray was reviewed and the findings at surgery were  discussed with the family. Cemented persona 7 narrow femur, cemented persona D tibia, 12 mm PS  poly, 29 patella, no lateral release.         Ethel Rebollar MD    D: 09/03/2021 10:44:19       T: 09/03/2021 14:24:48     [email or web address]  Job#: [id number]     Doc#: [id number]    CC:

## 2021-09-03 NOTE — PLAN OF CARE
See OT evaluation for all goals and OT POC.  Electronically signed by RAMON Rivera on 9/3/2021 at 2:14 PM

## 2021-09-03 NOTE — ANESTHESIA POSTPROCEDURE EVALUATION
Department of Anesthesiology  Postprocedure Note    Patient: Akbar Spears  MRN: [id number]  YOB: 1955  Date of evaluation: 9/3/2021  Time:  11:14 AM     Procedure Summary     Date: 09/03/21 Room / Location: [address]    Anesthesia Start: 5020 Anesthesia Stop:     Procedure: LEFT TOTAL KNEE ARTHROPLASTY. SUPINE;  23 HR BEDDED OBS; CONSTANTIN, [id number] PERSONAKendall STUART (Left ) Diagnosis: (LEFT DJD KNEE)    Surgeons: Ricky Garcia MD Responsible Provider: Dean Zimmerman DO    Anesthesia Type: spinal, regional ASA Status: 3          Anesthesia Type: Spinal, saphenous, iPACK    Catracho Phase I: Catracho Score: 7    Catracho Phase II:      Last vitals: Reviewed and per EMR flowsheets.        Anesthesia Post Evaluation    Patient location during evaluation: bedside  Patient participation: complete - patient participated  Level of consciousness: awake and awake and alert  Pain score: 0  Airway patency: patent  Nausea & Vomiting: no nausea and no vomiting  Complications: no  Cardiovascular status: blood pressure returned to baseline and hemodynamically stable  Respiratory status: acceptable  Hydration status: euvolemic

## 2021-09-03 NOTE — ANESTHESIA PRE PROCEDURE
Once Adjondi Radha Rodriguezer, APRN - CNP        celecoxib (CELEBREX) capsule 200 mg  200 mg Oral Once Adjondi Michelee Griselda, APRN - CNP        tranexamic acid (LYSTEDA) tablet 1,950 mg  1,950 mg Oral Once Adjondi Michelee Griselda, APRN - CNP        ceFAZolin (ANCEF) 2000 mg in dextrose 5 % 100 mL IVPB  2,000 mg IntraVENous Once Ervin Leonard MD           Allergies:  No Known Allergies    Problem List:    Patient Active Problem List   Diagnosis Code    Hypothyroid E03.9    Obesity E66.9    Vitamin B12 deficiency E53.8    Uncontrolled diabetes mellitus type 2 without complications [id number]    DM2 (diabetes mellitus, type 2) ([facility] E11.9    Adhesive capsulitis of both shoulders M75.01, M75.02    Herniated nucleus pulposus, C5-6 M50.222    Herniated nucleus pulposus, C6-7 left M50.223    Smoker F17.200    HTN (hypertension) I10    Hyperlipemia, mixed E78.2    Ingrowing nail T84.0    Metabolic syndrome A46.97    Radicular pain in right arm M79.2    Cervical radiculopathy M54.12    History of diabetes mellitus Z86.39    Knee pain M25.569    Personal history of malignant neoplasm Z85.9    Primary osteoarthritis of both knees M17.0    Shoulder joint pain M25.519    Ataxic gait R26.0    Status post total knee replacement, right Z96.651    Hand pain M79.643    Rupture of ulnar collateral ligament of thumb S63.649A       Past Medical History:        Diagnosis Date    [facility]     Cancer ([facility]     Depression     Hyperlipidemia     Hypertension     Thyroid disease     Type II diabetes mellitus, uncontrolled ([facility]        Past Surgical History:        Procedure Laterality Date    BREAST LUMPECTOMY Left 1996     SECTION       SECTION  1985    TOTAL KNEE ARTHROPLASTY Right 2021    RIGHT TOTAL KNEE ARTHROPLASTY WITH TIBIAL STEMS SUPINE; Nurys SHIPLEY TIBIAL STEM CONSTANTIN-NARCISO performed by Ervin Leonard MD at [facility]       Social History:    Social History     Tobacco Use    Smoking status: Current Every Day Smoker     Packs/day: 1.00     Years: 40.00     Pack years: 40.00     Types: Cigarettes    Smokeless tobacco: Never Used    Tobacco comment: trying to cut back   Substance Use Topics    Alcohol use: Never     Alcohol/week: 0.0 standard drinks                                Ready to quit: Not Answered  Counseling given: Not Answered  Comment: trying to cut back      Vital Signs (Current):   Vitals:    09/03/21 0734   BP: (!) 152/72   Pulse: 76   Resp: 16   Temp: 98 °F (36.7 °C)   TempSrc: Temporal   SpO2: 97%   Weight: 197 lb (89.4 kg)   Height: 5' 2\" (1.575 m)                                              BP Readings from Last 3 Encounters:   09/03/21 (!) 152/72   08/31/21 (!) 138/46   07/22/21 121/76       NPO Status: Time of last liquid consumption: 2300                        Time of last solid consumption: 2300                        Date of last liquid consumption: 09/02/21                        Date of last solid food consumption: 09/02/21    BMI:   Wt Readings from Last 3 Encounters:   09/03/21 197 lb (89.4 kg)   08/31/21 197 lb (89.4 kg)   07/22/21 200 lb (90.7 kg)     Body mass index is 36.03 kg/m². CBC:   Lab Results   Component Value Date    WBC 6.2 08/31/2021    RBC 4.35 08/31/2021    HGB 12.4 08/31/2021    HCT 37.4 08/31/2021    MCV 85.9 08/31/2021    RDW 14.3 08/31/2021     08/31/2021       CMP:   Lab Results   Component Value Date     08/31/2021    K 4.1 08/31/2021    K 4.1 02/20/2021     08/31/2021    CO2 26 08/31/2021    BUN 16 08/31/2021    CREATININE 0.72 08/31/2021    GFRAA >60.0 08/31/2021    LABGLOM >60.0 08/31/2021    GLUCOSE 175 08/31/2021    PROT 7.1 05/15/2019    PROT 7.1 04/26/2018    CALCIUM 10.0 08/31/2021    BILITOT 0.4 05/15/2019    ALKPHOS 54 05/15/2019    AST 10 05/15/2019    ALT 13 05/15/2019       POC Tests: No results for input(s): POCGLU, POCNA, POCK, POCCL, POCBUN, POCHEMO, POCHCT in the last 72 hours.     Coags: No results found for: PROTIME, INR, APTT    HCG (If Applicable): No results found for: PREGTESTUR, PREGSERUM, HCG, HCGQUANT     ABGs: No results found for: PHART, PO2ART, RZY2KKP, HBW8ZKL, BEART, K8LSCDHQ     Type & Screen (If Applicable):  No results found for: LABABO, LABRH    Drug/Infectious Status (If Applicable):  No results found for: HIV, HEPCAB    COVID-19 Screening (If Applicable):   Lab Results   Component Value Date    COVID19 Not Detected 08/31/2021           Anesthesia Evaluation  Patient summary reviewed and Nursing notes reviewed no history of anesthetic complications:   Airway: Mallampati: II  TM distance: >3 FB   Neck ROM: full  Mouth opening: > = 3 FB Dental: normal exam         Pulmonary:normal exam    (+) current smoker          Patient did not smoke on day of surgery. Cardiovascular:  Exercise tolerance: good (>4 METS),   (+) hypertension:, hyperlipidemia      ECG reviewed               Beta Blocker:  Not on Beta Blocker      ROS comment: Sinus rhythm with marked sinus arrhythmia  Otherwise normal ECG  When compared with ECG of 12-FEB-2021 13:55,  No significant change was found     Neuro/Psych:   (+) neuromuscular disease:, psychiatric history:            GI/Hepatic/Renal: Neg GI/Hepatic/Renal ROS  (+) morbid obesity         ROS comment: BMI 36.   Endo/Other:    (+) DiabetesType II DM, , hypothyroidism::., malignancy/cancer. Pt had PAT visit. Abdominal:             Vascular: negative vascular ROS. Other Findings:           Anesthesia Plan      spinal and regional     ASA 3     (Bupivicaine 0.5% 10 mg   Saphenous nerve + iPACK block with US  Discussed risk / benefit of spinal anesthesia versus general anesthesia. Discussed risk of bleeding, bruising, infection and nerve injury.)      MIPS: Prophylactic antiemetics administered. Anesthetic plan and risks discussed with patient. Plan discussed with CRNA.     Attending anesthesiologist reviewed and agrees with Pre Eval content              Elesa Plan, DO   9/3/2021

## 2021-09-03 NOTE — FLOWSHEET NOTE
1213-- Patient arrived to unit room 222, assessment complete, patient awake and alert; oriented to person, place, and time, patient denies pain, no nausea and no vomiting, no cp or sob, oriented to room and call system, call light in reach, bed in lowest position, bed alarm engaged, will continue to monitor.

## 2021-09-03 NOTE — ANESTHESIA PROCEDURE NOTES
Peripheral Block    Patient location during procedure: pre-op  Start time: 9/3/2021 9:23 AM  End time: 9/3/2021 9:33 AM  Staffing  Performed: anesthesiologist   Anesthesiologist: Nadine Bird DO  Preanesthetic Checklist  Completed: patient identified, IV checked, site marked, risks and benefits discussed, surgical consent, monitors and equipment checked, pre-op evaluation, timeout performed, anesthesia consent given, oxygen available and patient being monitored  Peripheral Block  Patient position: supine  Prep: ChloraPrep  Patient monitoring: cardiac monitor, continuous pulse ox, frequent blood pressure checks and IV access  Block type: Saphenous and iPacks  Laterality: left  Injection technique: single-shot  Guidance: ultrasound guided  Local infiltration: ropivacaine  Infiltration strength: 0.5 %  Dose: 30 mL  Provider prep: mask and sterile gloves (Sterile probe cover)  Local infiltration: ropivacaine  Needle  Needle type: combined needle/nerve stimulator   Needle gauge: 22 G  Needle length: 10 cm  Needle localization: anatomical landmarks and ultrasound guidance  Assessment  Injection assessment: negative aspiration for heme, no paresthesia on injection and local visualized surrounding nerve on ultrasound  Paresthesia pain: immediately resolved  Slow fractionated injection: yes  Hemodynamics: stable  Additional Notes  Ultrasound image printed and saved in patient chart.     Sterile probe cover used    15 ml saphenous  15 ml iPACKs  Medications Administered  Ropivacaine (NAROPIN) injection 0.5%, 30 mL  Reason for block: post-op pain management and at surgeon's request

## 2021-09-03 NOTE — BRIEF OP NOTE
Brief Postoperative Note      Patient: Salomón Chavez  YOB: 1955  MRN: [id number]    Date of Procedure: 9/3/2021    Pre-Op Diagnosis: LEFT DJD KNEE    Post-Op Diagnosis: Same       Procedure(s):  LEFT TOTAL KNEE ARTHROPLASTY. SUPINE;  23 HR BEDDED OBS; CONSTANTIN, [id number] PERSONAKendall STUART    Surgeon(s):  Jose Hernandez MD    Assistant:  Physician Assistant: Maral Gama PA-C    Anesthesia: Spinal    Estimated Blood Loss (mL): Minimal    Complications: None    Specimens:   * No specimens in log *    Implants:  Implant Name Type Inv.  Item Serial No.  Lot No. LRB No. Used Action   CEMENT BNE 40GM HI VISC RADPQ FOR REV SURG  CEMENT BNE 40GM HI VISC RADPQ FOR REV SURG  [employer]- [id number] Left 1 Implanted   CEMENT BNE 40GM HI VISC RADPQ FOR REV SURG  CEMENT BNE 40GM HI VISC RADPQ FOR REV SURG  [employer]- [id number] Left 1 Implanted   COMPONENT ARTC SURF PS 6-9 CD 12 MM LT TIB FIX BEAR  COMPONENT ARTC SURF PS 6-9 CD 12 MM LT TIB FIX BEAR  [employer]WD [id number] Left 1 Implanted   DUP USE [id number] IMPL KNEE PSN ALL POLY PAT PLY 29MM Knee DUP USE [id number] IMPL KNEE PSN ALL POLY PAT PLY 29MM  [employer]PMM [id number] Left 1 Implanted   COMPONENT FEM SZ 7 KATHRINE L KNEE CO CHROM BLANCA POST STBL COR  COMPONENT FEM SZ 7 KATHRINE L KNEE CO CHROM BLANCA POST STBL COR  [employer]WD [id number] Left 1 Implanted   PSN TIB STM 5 DEG SZ D L  PSN TIB STM 5 DEG SZ D L  [employer]- [id number] Left 1 Implanted         Drains: * No LDAs found *    Findings: djd with varus deformity left knee    Electronically signed by Jose Hernandez MD on 9/3/2021 at 10:32 AM

## 2021-09-03 NOTE — PROGRESS NOTES
Patient arrived to floor from recovery. She is awake and alert and reports to have no pain to operative extremity. She reports the extremity to feel heavy which is from the nerve block and will subside as it wears off. The patient is able to perform ROM of the left foot, ankle, and toes. Extremity is pink warm and dry with distal pulses intact. Knee immobilizer in place and to remain in place until patient is able to straight leg raise. Patient plans to go home tomorrow with David Vazquez. She has necessary DME equipment at home and will not requiring any for discharge. The patient admits to smoking 1.5 pack of cigarettes per day for the past 50 years. I have offered her smoking cessation education and ordered nicotine patch in the event she may need it. I spoke with the patient for 5 minutes regarding smoking cessation and associated health risks including cancer, copd, cad, mi, cva and the worsening of their complaint. We discussed cessation aids and their use at length, including quitting \"cold turkey\", chantix, and nicotine patches.

## 2021-09-03 NOTE — DISCHARGE INSTR - COC
Continuity of Care Form    Patient Name: Daina Baugh   :    MRN:  [id number]    [address] [place] [address] date:  9/3/2021  Discharge date:  ***    Code Status Order: Full Code   Advance Directives:   Advance Care Flowsheet Documentation       Date/Time Healthcare Directive Type of Healthcare Directive Copy in [address] Agent's Name Healthcare Agent's Phone Number    21 0740  No, patient does not have an advance directive for healthcare treatment  --  --  --  --  --            Admitting Physician:  Cesar Quiles MD  PCP: No primary care provider on file. Discharging Nurse: [facility] Unit/Room#: N535/V604-26  Discharging Unit Phone Number: ***    Emergency Contact:   Extended Emergency Contact Information  Primary Emergency Contact: Christie Yu, [address] [place] [address] Phone: [phone]  Mobile Phone: [phone]  Relation: Child  Preferred language: English   needed? No  Secondary Emergency Contact: Akash Carla  Address: [address] [place] [address] Phone: [phone]  Mobile Phone: [phone]  Relation: Spouse  Contact is hearing impaired. Preferred language: Hearing Impaired   needed? Yes    Past Surgical History:  Past Surgical History:   Procedure Laterality Date    BREAST LUMPECTOMY Left Deja Rajan Nicole 1615    TOTAL KNEE ARTHROPLASTY Right 2021    RIGHT TOTAL KNEE ARTHROPLASTY WITH TIBIAL STEMS SUPINE; Lizabeth Coup PERSONA TIBIAL STEM CONSTANTIN-NARCISO performed by Cesar Quiles MD at [address] [place] [address] Left 9/3/2021    LEFT TOTAL KNEE ARTHROPLASTY. SUPINE;  23 HR BEDDED OBS; CONSTANTIN, [id number] PERSONA. NARCISO performed by Cesar Quiles MD at [facility]       Immunization History: There is no immunization history on file for this patient.     Active Problems:  Patient Active Problem List   Diagnosis Code    Hypothyroid E03.9 Obesity E66.9    Vitamin B12 deficiency E53.8    Uncontrolled diabetes mellitus type 2 without complications [id number]    DM2 (diabetes mellitus, type 2) ([facility]) E11.9    Adhesive capsulitis of both shoulders M75.01, M75.02    Herniated nucleus pulposus, C5-6 M50.222    Herniated nucleus pulposus, C6-7 left M50.223    Smoker F17.200    HTN (hypertension) I10    Hyperlipemia, mixed E78.2    Ingrowing nail U93.7    Metabolic syndrome X78.15    Radicular pain in right arm M79.2    Cervical radiculopathy M54.12    History of diabetes mellitus Z86.39    Knee pain M25.569    Personal history of malignant neoplasm Z85.9    Primary osteoarthritis of both knees M17.0    Shoulder joint pain M25.519    Ataxic gait R26.0    Status post total knee replacement, right Z96.651    Hand pain M79.643    Rupture of ulnar collateral ligament of thumb S63.649A    S/P total knee arthroplasty, left W48.412       Isolation/Infection:   Isolation            No Isolation          Patient Infection Status       Infection Onset Added Last Indicated Last Indicated By Review Planned Expiration Resolved Resolved By    None active    Resolved    COVID-19 Rule Out 08/31/21 08/31/21 08/31/21 COVID-19 (Ordered)   09/01/21 Rule-Out Test Resulted    COVID-19 Rule Out 02/12/21 02/12/21 02/12/21 Covid-19 Ambulatory (Ordered)   02/13/21 Rule-Out Test Resulted            Nurse Assessment:  Last Vital Signs: BP (!) 129/59   Pulse 58   Temp 97.4 °F (36.3 °C) (Temporal)   Resp 18   Ht 5' 2\" (1.575 m)   Wt 197 lb (89.4 kg)   SpO2 95%   BMI 36.03 kg/m²     Last documented pain score (0-10 scale): Pain Level: 0  Last Weight:   Wt Readings from Last 1 Encounters:   09/03/21 197 lb (89.4 kg)     Mental Status:  {IP PT MENTAL STATUS:20030:::0}    IV Access:  { JOSE ARMANDO IV ACCESS:994912258:::0}    Nursing Mobility/ADLs:  Walking   {[facility] DME ADLs:293025006:::0}  Transfer  {[facility] DME ADLs:880218461:::0}  Bathing  {[facility] DME ADLs:192791380:::0}  Dressing  {[facility] DME ADLs:642789956:::0}  Toileting  {[facility] DME ADLs:637282600:::0}  Feeding  {[facility] DME ADLs:008600347:::0}  Med Admin  {[facility] DME ADLs:409177534:::0}  Med Delivery   { JOSE ARMANDO MED Delivery:461089233:::0}    Wound Care Documentation and Therapy:        Elimination:  Continence: Bowel: {YES / IW:26104}  Bladder: {YES / R}  Urinary Catheter: {Urinary Catheter:567624138:::0}   Colostomy/Ileostomy/Ileal Conduit: {YES / NIMO:21579}       Date of Last BM: ***    Intake/Output Summary (Last 24 hours) at 9/3/2021 1546  Last data filed at 9/3/2021 1102  Gross per 24 hour   Intake 500 ml   Output --   Net 500 ml     I/O last 3 completed shifts:   In: 500 [I.V.:500]  Out: -     Safety Concerns:     [employer] Safety Concerns:821412583:::0}    Impairments/Disabilities:      [employer] Impairments/Disabilities:626163291:::0}    Nutrition Therapy:  Current Nutrition Therapy:   [employer] Diet List:892211128:::0}    Routes of Feeding: {P DME Other Feedings:418765751:::0}  Liquids: {Slp liquid thickness:13441}  Daily Fluid Restriction: {[facility] DME Yes amt example:856090049:::0}  Last Modified Barium Swallow with Video (Video Swallowing Test): {Done Not Done AJNE:014816643:::6}    Treatments at the Time of Hospital Discharge:   Respiratory Treatments: ***  Oxygen Therapy:  {Therapy; copd oxygen:09077:::0}  Ventilator:    {[facility] Vent List:784358902:::0}    Rehab Therapies: {THERAPEUTIC INTERVENTION:9068364207}  Weight Bearing Status/Restrictions: [employer] Weight Bearin:::0}  Other Medical Equipment (for information only, NOT a DME order):  {EQUIPMENT:538091741}  Other Treatments: ***    Patient's personal belongings (please select all that are sent with patient):  {[facility] DME Belongings:923032589:::0}    RN SIGNATURE:  {Esignature:846501535:::0}    CASE MANAGEMENT/SOCIAL WORK SECTION    Inpatient Status Date: ***    Readmission Risk Assessment Score:  Readmission Risk              Risk of Unplanned Readmission:  0           Discharging to Facility/ Agency   Name:   Address:  Phone:  Fax:    Dialysis Facility (if applicable)   Name:  Address:  Dialysis Schedule:  Phone:  Fax:    / signature: {Esignature:791936212:::0}    PHYSICIAN SECTION    Prognosis: {Prognosis:3727163946:::0}    Condition at Discharge: Lulú Abrams Patient Condition:147376414:::0}    Rehab Potential (if transferring to Rehab): {Prognosis:3537496044:::0}    Recommended Labs or Other Treatments After Discharge: ***    Physician Certification: I certify the above information and transfer of Eitan Brumfield  is necessary for the continuing treatment of the diagnosis listed and that she requires {Admit to Appropriate Level of Care:98072:::0} for {GREATER/LESS:821018309} 30 days.      Update Admission H&P: {[facility] DME Changes in HandP:845084679:::0}    PHYSICIAN SIGNATURE:  Electronically signed by Lucero Sims MD on 9/3/21 at 3:46 PM EDT

## 2021-09-03 NOTE — PROGRESS NOTES
[facility] OCCUPATIONAL THERAPY EVALUATION - ACUTE     NAME: Mar Pardo  :  (77 y.o.)  MRN: [id number]  CODE STATUS: Full Code  Room: V897/J543-93    Date of Service: 9/3/2021    Patient Diagnosis(es): S/P total knee arthroplasty, left [Z96.652]   No chief complaint on file.     Patient Active Problem List    Diagnosis Date Noted    S/P total knee arthroplasty, left 2021    Hand pain 2021    Rupture of ulnar collateral ligament of thumb 2021    Status post total knee replacement, right 2021    Ataxic gait 2020    History of diabetes mellitus 09/15/2020    Knee pain 09/15/2020    Personal history of malignant neoplasm 09/15/2020    Primary osteoarthritis of both knees 09/15/2020    Shoulder joint pain 09/15/2020    Radicular pain in right arm 2020    Cervical radiculopathy 2020    Adhesive capsulitis of both shoulders 2017    Herniated nucleus pulposus, C5-6 2017    Herniated nucleus pulposus, C6-7 left 2017    Smoker 2017    Uncontrolled diabetes mellitus type 2 without complications 6218    Obesity 2016    Vitamin B12 deficiency 2016    DM2 (diabetes mellitus, type 2) ([address] 2015    HTN (hypertension) 2015    Hypothyroid 2015    Hyperlipemia, mixed     Metabolic syndrome     Ingrowing nail 2009        Past Medical History:   Diagnosis Date    Breast cancer ([address]     Cancer ([address]     Depression     Hyperlipidemia     Hypertension     Thyroid disease     Type II diabetes mellitus, uncontrolled ([address]      Past Surgical History:   Procedure Laterality Date    BREAST LUMPECTOMY Left 1996     SECTION       SECTION  1985    TOTAL KNEE ARTHROPLASTY Right 2021    RIGHT TOTAL KNEE ARTHROPLASTY WITH TIBIAL STEMS SUPINE; CONSTANTIN PERSONA TIBIAL STEM CONSTANTIN-NARCISO performed by Meño Win MD at [facility] Restrictions  Restrictions/Precautions: Weight Bearing, Fall Risk (High per osorio score)  Lower Extremity Weight Bearing Restrictions  Left Lower Extremity Weight Bearing: Weight Bearing As Tolerated  Required Braces or Orthoses  Left Lower Extremity Brace: Knee Immobilizer (until +SLR)     Safety Devices: Safety Devices  Safety Devices in place: Yes  Type of devices:  All fall risk precautions in place        Subjective  Pre Treatment Pain Screening  Pain at present: 0  Scale Used: Numeric Score  Intervention List: Patient able to continue with treatment    Pain Reassessment:   Pain Assessment  Patient Currently in Pain: Denies  Pain Assessment: 0-10  Pain Level: 0       Prior Level of Function:  Social/Functional History  Lives With: Spouse, Family  Type of Home: House  Home Layout: One level, Laundry in basement  Home Access: Stairs to enter with rails  Entrance Stairs - Number of Steps: 4  Entrance Stairs - Rails: Left  Bathroom Shower/Tub: Tub/Shower unit  Bathroom Equipment: Shower chair, Grab bars in shower, 3-in-1 commode  Home Equipment: Rolling walker, Cane  ADL Assistance: Independent  Homemaking Assistance: Independent  Homemaking Responsibilities: Yes  Meal Prep Responsibility: Primary  Laundry Responsibility: Primary  Cleaning Responsibility: Primary  Ambulation Assistance: Independent (no AD)  Transfer Assistance: Independent  Active : Yes    OBJECTIVE:     Orientation Status:  Orientation  Overall Orientation Status: Within Functional Limits    Observation:  Observation/Palpation  Observation: L knee immobilizer adjusted for fit; IV running; pt began vomiting during session- RN made aware; mild c/o dizziness    Cognition Status:  Cognition  Overall Cognitive Status: WFL    Perception Status:  Perception  Overall Perceptual Status: WFL    Sensation Status:  Sensation  Overall Sensation Status: WFL    Vision and Hearing Status:  Vision  Vision: Within Functional Limits  Hearing  Hearing: Within functional limits     ROM:   LUE AROM (degrees)  LUE AROM : Exceptions  LUE General AROM: Patient reports decreased shoulder ROM; WFL elbow, forearm, wrist  Left Hand AROM (degrees)  Left Hand AROM: WFL  RUE AROM (degrees)  RUE AROM : Exceptions  RUE General AROM: Patient reports decreased shoulder ROM; WFL elbow, forearm, wrist  Right Hand AROM (degrees)  Right Hand AROM: WFL    Strength:  LUE Strength  LUE Strength Comment: Gross Assessment: 3+/5  RUE Strength  RUE Strength Comment: Gross Assessment: 3+/5    Coordination, Tone, Quality of Movement: Tone RUE  RUE Tone: Normotonic  Tone LUE  LUE Tone: Normotonic  Coordination  Movements Are Fluid And Coordinated: Yes    Hand Dominance:  Hand Dominance  Hand Dominance: Right    ADL Status:  ADL  Feeding: Setup  Grooming: Setup  UE Bathing: Setup  LE Bathing: Minimal assistance  UE Dressing: Setup  LE Dressing: Moderate assistance  Toileting: Contact guard assistance  Additional Comments:  All ADLs simulated as above  Toilet Transfers  Toilet - Technique: Ambulating  Equipment Used: Grab bars  Toilet Transfer: Stand by assistance  Toilet Transfers Comments: Patient requires cues for safe technique  Tub Transfers  Tub Transfers: Not tested    Therapy key for assistance levels -   Independent = Pt. is able to perform task with no assistance but may require a device   Stand by assistance = Pt. does not perform task at an independent level but does not need physical assistance, requires verbal cues  Minimal, Moderate, Maximal Assistance = Pt. requires physical assistance (25%, 50%, 75% assist from helper) for task but is able to actively participate in task   Dependent = Pt. requires total assistance with task and is not able to actively participate with task completion     Functional Mobility:  Functional Mobility  Functional - Mobility Device: Rolling Walker  Activity: To/from bathroom  Assist Level: Stand by assistance  Transfers  Sit to stand: Stand by assistance  Stand to sit: Stand by assistance    Bed Mobility  Bed mobility  Supine to Sit: Supervision  Sit to Supine: Unable to assess (Patient sitting in bedside chair)    Seated and Standing Balance:  Balance  Sitting Balance: Supervision  Standing Balance: Stand by assistance    Functional Endurance:  Activity Tolerance  Activity Tolerance: Patient Tolerated treatment well    D/C Recommendations:  OT D/C RECOMMENDATIONS  REQUIRES OT FOLLOW UP: Yes    Equipment Recommendations:  OT Equipment Recommendations  Other: Continue to assess    OT Education:   OT Education  OT Education: Plan of Care, OT Role    OT Follow Up:  OT D/C RECOMMENDATIONS  REQUIRES OT FOLLOW UP: Yes       Assessment/Discharge Disposition:  Assessment: Patient is a 78year old female who presents to [facility] from home with family s/p L TKA. Patient demonstrates the above deficits and would benefit from continued occupational therapy to increase safety and independence with ADL/IADL tasks.   Performance deficits / Impairments: Decreased functional mobility , Decreased balance, Decreased coordination, Decreased ADL status, Decreased endurance, Decreased high-level IADLs, Decreased strength  Prognosis: Good  Discharge Recommendations: Continue to assess pending progress  Decision Making: Medium Complexity  History: Multiple comorbidities  Exam: 7 performance deficits  Assistance / Modification: Min-Mod A    Six Click Score   How much help for putting on and taking off regular lower body clothing?: A Lot  How much help for Bathing?: A Little  How much help for Toileting?: A Little  How much help for putting on and taking off regular upper body clothing?: None  How much help for taking care of personal grooming?: None  How much help for eating meals?: None  AM-[facility] Inpatient Daily Activity Raw Score: 20  AM-[facility] Inpatient ADL T-Scale Score : 42.03  ADL Inpatient CMS 0-100% Score: 38.32    Plan:  Plan  Times per week: 1-3x/week  Plan weeks: Length of acute care stay  Current Treatment Recommendations: Strengthening, Endurance Training, Patient/Caregiver Education & Training, Equipment Evaluation, Education, & procurement, Self-Care / ADL, Balance Training, Home Management Training, Cognitive/Perceptual Training, Functional Mobility Training, Safety Education & Training    Goals:   Patient will:    - Improve functional endurance to tolerate/complete 30 mins of ADL's  - Be Independent in UB ADLs   - Be Modified Independent in LB ADLs  - Be Modified Independent in ADL transfers without LOB  - Be Independent in toileting tasks  - Improve bilateral UE strength and endurance to 4/5 in order to participate in self-care activities as projected. - Access appropriate D/C site with as few architectural barriers as possible. - Sequence self-care tasks with no cues for safety awareness  - Other :  improve sitting/standing balance to complete ADLs as projected.      Patient Goal:    To return home   Discussed and agreed upon: Yes Comments:     Therapy Time:   OT Individual Minutes  Time In: 1329  Time Out: 8154  Minutes: 20    Eval: 20 minutes     Electronically signed by:    Christos Medellin OTR/LULU OTR/L  9/3/2021, 2:12 PM

## 2021-09-03 NOTE — CONSULTS
Hospitalist Group   Consult for Medical Management      Reason for Consult:  Medical management of diabetes and hypertension    History of Present Illness:  78 y.o. female with a history of diabetes, hypertension, hyperlipidemia, hypothyroidism presents for left knee arthroplasty. Patient had surgery earlier today. She denies any pain currently but she would like to get up and move of the left her. She is denying any symptoms. No chest pain, shortness of breath, nausea, vomiting, bowel movement changes, urinary symptoms. Patient has history of diabetes and she is on Metformin. REVIEW OF SYSTEMS:  Complete ROS performed with patient, pertinent positives and negatives are listed in the HPI. PMH:  Past Medical History:   Diagnosis Date    [address]     Cancer ([facility] [address]     Depression     Hyperlipidemia     Hypertension     Thyroid disease     Type II diabetes mellitus, uncontrolled ([facility] [address]        Surgical History:  Past Surgical History:   Procedure Laterality Date    BREAST LUMPECTOMY Left 1996 [place] 1965    TOTAL KNEE ARTHROPLASTY Right 2/19/2021    RIGHT TOTAL KNEE ARTHROPLASTY WITH TIBIAL STEMS SUPINE; Isaías SHIPLEY TIBIAL STEM CONSTANTIN-NARCISO performed by Bobbi Walker MD at [facility]       Medications Prior to Admission:    Prior to Admission medications    Medication Sig Start Date End Date Taking? Authorizing Provider   HYDROcodone-acetaminophen (NORCO) 5-325 MG per tablet Take by mouth.  9/2/21  Yes Historical Provider, MD   sertraline (ZOLOFT) 100 MG tablet TAKE 1 TABLET DAILY 7/12/21  Yes Magdaleno Rodarte MD   amLODIPine (NORVASC) 10 MG tablet Once a day 12/22/20  Yes Agnes Wakefield MD   levothyroxine (SYNTHROID) 100 MCG tablet Take 1 tablet by mouth Daily 12/22/20  Yes Agnes Wakefield MD   metFORMIN (GLUCOPHAGE) 500 MG tablet TAKE 1 TABLET TWICE A DAY WITH MEALS 12/22/20  Yes Agnes Wakefield MD   atorvastatin (LIPITOR) 10 MG tablet TAKE 1 TABLET DAILY 12/22/20  Yes Yaw Rajan MD   blood glucose monitor strips Test BG once daily, DX: E11.9, NIDDM 12/6/18  Yes Yaw Rajan MD   Lancets MISC Test BG once daily, DX: E11.65, NIDDM 12/6/18  Yes Yaw Rajan MD   Syringe/Needle, Disp, (SYRINGE 3CC/25GX1\") 25G X 1\" 3 ML MISC Use for monthly B-12 injection 3/30/17  Yes Yaw Rajan MD   Alcohol Swabs PADS Bid 8/1/16  Yes Yaw Rajan MD   ibuprofen (ADVIL;MOTRIN) 800 MG tablet Take 1 tablet by mouth every 6 hours as needed for Pain 9/18/20 12/17/20  Yonas Heard MD   Blood Glucose Monitoring Suppl MIGUEL ANGEL Test BG once daily, DX: E11.65, NIDDM 1/6/17   Yaw Rajan MD       Allergies:    Patient has no known allergies. Social History:    reports that she has been smoking cigarettes. She has a 40.00 pack-year smoking history. She has never used smokeless tobacco. She reports previous drug use. Drug: Marijuana. She reports that she does not drink alcohol.     Family History:       Problem Relation Age of Onset    Heart Disease Mother     Cancer Father          PHYSICAL EXAM:  Vitals:  BP (!) 129/59   Pulse 58   Temp 97.4 °F (36.3 °C) (Temporal)   Resp 18   Ht 5' 2\" (1.575 m)   Wt 197 lb (89.4 kg)   SpO2 95%   BMI 36.03 kg/m²   General Appearance: alert and oriented to person, place and time, well developed and well- nourished, in no acute distress  Skin: warm and dry, no rash or erythema  Head: normocephalic and atraumatic  Eyes: pupils equal, round, and reactive to light, extraocular eye movements intact, conjunctivae normal  ENT: tympanic membrane, external ear and ear canal normal bilaterally, nose without deformity, nasal mucosa and turbinates normal without polyps  Neck: supple and non-tender without mass, no thyromegaly or thyroid nodules, no cervical lymphadenopathy  Pulmonary/Chest: clear to auscultation bilaterally- no wheezes, rales or rhonchi, normal air movement, no respiratory distress  Cardiovascular: normal rate, regular rhythm, normal S1 and S2, no murmurs, rubs, clicks, or gallops, distal pulses intact, no carotid bruits  Abdomen: soft, non-tender, non-distended, normal bowel sounds, no masses or organomegaly  Extremities: no cyanosis, clubbing or edema  Musculoskeletal: Restricted range of motion in the left lower extremity due to surgery, no joint swelling, deformity or tenderness  Neurologic: reflexes normal and symmetric, no cranial nerve deficit, coordination and speech normal      LABS:  Recent Labs     08/31/21  1442      K 4.1      CO2 26   BUN 16   CREATININE 0.72   GLUCOSE 175*   CALCIUM 10.0*       Recent Labs     08/31/21  1443   WBC 6.2   RBC 4.35   HGB 12.4   HCT 37.4   MCV 85.9   MCH 28.4   MCHC 33.1   RDW 14.3          Recent Labs     09/03/21  0804 09/03/21  1133   POCGLU 119* 126*       CBC with Differential:    Lab Results   Component Value Date    WBC 6.2 08/31/2021    RBC 4.35 08/31/2021    HGB 12.4 08/31/2021    HCT 37.4 08/31/2021     08/31/2021    MCV 85.9 08/31/2021    MCH 28.4 08/31/2021    MCHC 33.1 08/31/2021    RDW 14.3 08/31/2021     CMP:    Lab Results   Component Value Date     08/31/2021    K 4.1 08/31/2021    K 4.1 02/20/2021     08/31/2021    CO2 26 08/31/2021    BUN 16 08/31/2021    CREATININE 0.72 08/31/2021    GFRAA >60.0 08/31/2021    LABGLOM >60.0 08/31/2021    GLUCOSE 175 08/31/2021    PROT 7.1 05/15/2019    PROT 7.1 04/26/2018    LABALBU 4.2 05/15/2019    CALCIUM 10.0 08/31/2021    BILITOT 0.4 05/15/2019    ALKPHOS 54 05/15/2019    AST 10 05/15/2019    ALT 13 05/15/2019       Radiology:   XR KNEE LEFT (1-2 VIEWS)    (Results Pending)           ASSESSMENT/ PLAN:    1. Arthritis of the left knee-status post arthroplasty. Orthopedics managing. 2. Diabetes-on Metformin. Will hold p.o. medication and start patient on insulin sliding scale while in the hospital.  Monitor blood glucose closely  3.  History of hypertension-resume amlodipine and monitor blood pressure

## 2021-09-03 NOTE — ANESTHESIA PROCEDURE NOTES
Spinal Block    Patient location during procedure: pre-op  Start time: 9/3/2021 9:23 AM  End time: 9/3/2021 9:33 AM  Reason for block: primary anesthetic  Staffing  Performed: anesthesiologist   Anesthesiologist: Jeanie Stone DO  Preanesthetic Checklist  Completed: patient identified, IV checked, site marked, risks and benefits discussed, surgical consent, monitors and equipment checked, pre-op evaluation, timeout performed, anesthesia consent given, oxygen available and patient being monitored  Spinal Block  Patient position: sitting  Prep: Betadine  Patient monitoring: cardiac monitor, continuous pulse ox and frequent blood pressure checks  Approach: midline  Location: L4/L5  Provider prep: mask and sterile gloves  Local infiltration: lidocaine  Agent: bupivacaine (Isobaric bupivicain 0.5%)  Dose: 2  Dose: 2  Needle  Needle type: Pencan   Needle gauge: 22 G  Needle length: 3.5 in  Assessment  Sensory level: T6  Events: cerebrospinal fluid  Lysis level: CSF aspirated. CSF: clear  Attempts: 1  Hemodynamics: stable  Additional Notes  Free flowing CSF. Negative heme. Negative paresthesia. [place]

## 2021-09-04 VITALS
HEART RATE: 67 BPM | OXYGEN SATURATION: 97 % | HEIGHT: 62 IN | WEIGHT: 197 LBS | DIASTOLIC BLOOD PRESSURE: 62 MMHG | TEMPERATURE: 98.4 F | BODY MASS INDEX: 36.25 KG/M2 | RESPIRATION RATE: 18 BRPM | SYSTOLIC BLOOD PRESSURE: 152 MMHG

## 2021-09-04 LAB
ANION GAP SERPL CALCULATED.3IONS-SCNC: 11 MEQ/L (ref 9–15)
BUN BLDV-MCNC: 20 MG/DL (ref 8–23)
CALCIUM SERPL-MCNC: 8.8 MG/DL (ref 8.5–9.9)
CHLORIDE BLD-SCNC: 103 MEQ/L (ref 95–107)
CO2: 23 MEQ/L (ref 20–31)
CREAT SERPL-MCNC: 0.7 MG/DL (ref 0.5–0.9)
GFR AFRICAN AMERICAN: >60
GFR NON-AFRICAN AMERICAN: >60
GLUCOSE BLD-MCNC: 112 MG/DL (ref 70–99)
GLUCOSE BLD-MCNC: 119 MG/DL (ref 60–115)
GLUCOSE BLD-MCNC: 172 MG/DL (ref 60–115)
HCT VFR BLD CALC: 31.4 % (ref 37–47)
HEMOGLOBIN: 10.7 G/DL (ref 12–16)
MCH RBC QN AUTO: 29.2 PG (ref 27–31.3)
MCHC RBC AUTO-ENTMCNC: 34 % (ref 33–37)
MCV RBC AUTO: 85.9 FL (ref 82–100)
PDW BLD-RTO: 14 % (ref 11.5–14.5)
PERFORMED ON: ABNORMAL
PERFORMED ON: ABNORMAL
PLATELET # BLD: 203 K/UL (ref 130–400)
POTASSIUM REFLEX MAGNESIUM: 4.5 MEQ/L (ref 3.4–4.9)
RBC # BLD: 3.66 M/UL (ref 4.2–5.4)
SODIUM BLD-SCNC: 137 MEQ/L (ref 135–144)
WBC # BLD: 10.5 K/UL (ref 4.8–10.8)

## 2021-09-04 PROCEDURE — 36415 COLL VENOUS BLD VENIPUNCTURE: CPT

## 2021-09-04 PROCEDURE — 6370000000 HC RX 637 (ALT 250 FOR IP): Performed by: INTERNAL MEDICINE

## 2021-09-04 PROCEDURE — 96374 THER/PROPH/DIAG INJ IV PUSH: CPT

## 2021-09-04 PROCEDURE — 97116 GAIT TRAINING THERAPY: CPT

## 2021-09-04 PROCEDURE — 2500000003 HC RX 250 WO HCPCS: Performed by: NURSE PRACTITIONER

## 2021-09-04 PROCEDURE — 96375 TX/PRO/DX INJ NEW DRUG ADDON: CPT

## 2021-09-04 PROCEDURE — 97535 SELF CARE MNGMENT TRAINING: CPT

## 2021-09-04 PROCEDURE — G0378 HOSPITAL OBSERVATION PER HR: HCPCS

## 2021-09-04 PROCEDURE — 2580000003 HC RX 258: Performed by: NURSE PRACTITIONER

## 2021-09-04 PROCEDURE — 80048 BASIC METABOLIC PNL TOTAL CA: CPT

## 2021-09-04 PROCEDURE — 85027 COMPLETE CBC AUTOMATED: CPT

## 2021-09-04 PROCEDURE — 6360000002 HC RX W HCPCS: Performed by: NURSE PRACTITIONER

## 2021-09-04 PROCEDURE — 6370000000 HC RX 637 (ALT 250 FOR IP): Performed by: NURSE PRACTITIONER

## 2021-09-04 RX ORDER — ASPIRIN 81 MG/1
81 TABLET ORAL 2 TIMES DAILY
Qty: 30 TABLET | Refills: 0 | Status: SHIPPED | OUTPATIENT
Start: 2021-09-04 | End: 2021-09-18

## 2021-09-04 RX ADMIN — OXYCODONE 10 MG: 5 TABLET ORAL at 09:25

## 2021-09-04 RX ADMIN — OXYCODONE 10 MG: 5 TABLET ORAL at 14:21

## 2021-09-04 RX ADMIN — TRANEXAMIC ACID 1950 MG: 650 TABLET ORAL at 06:56

## 2021-09-04 RX ADMIN — OXYCODONE 10 MG: 5 TABLET ORAL at 01:19

## 2021-09-04 RX ADMIN — LEVOTHYROXINE SODIUM 100 MCG: 0.1 TABLET ORAL at 06:56

## 2021-09-04 RX ADMIN — CEFAZOLIN 2000 MG: 10 INJECTION, POWDER, FOR SOLUTION INTRAVENOUS at 01:20

## 2021-09-04 RX ADMIN — ACETAMINOPHEN 650 MG: 325 TABLET ORAL at 01:19

## 2021-09-04 RX ADMIN — ASPIRIN 81 MG: 81 TABLET, COATED ORAL at 09:25

## 2021-09-04 RX ADMIN — ACETAMINOPHEN 650 MG: 325 TABLET ORAL at 06:57

## 2021-09-04 RX ADMIN — ACETAMINOPHEN 650 MG: 325 TABLET ORAL at 12:48

## 2021-09-04 RX ADMIN — AMLODIPINE BESYLATE 10 MG: 10 TABLET ORAL at 09:25

## 2021-09-04 RX ADMIN — FAMOTIDINE 20 MG: 10 INJECTION, SOLUTION INTRAVENOUS at 10:10

## 2021-09-04 RX ADMIN — SERTRALINE 100 MG: 100 TABLET, FILM COATED ORAL at 09:25

## 2021-09-04 RX ADMIN — DIAZEPAM 2 MG: 2 TABLET ORAL at 06:56

## 2021-09-04 RX ADMIN — ONDANSETRON 4 MG: 2 INJECTION INTRAMUSCULAR; INTRAVENOUS at 11:12

## 2021-09-04 ASSESSMENT — PAIN SCALES - GENERAL
PAINLEVEL_OUTOF10: 7
PAINLEVEL_OUTOF10: 8
PAINLEVEL_OUTOF10: 7
PAINLEVEL_OUTOF10: 7
PAINLEVEL_OUTOF10: 5
PAINLEVEL_OUTOF10: 7
PAINLEVEL_OUTOF10: 7

## 2021-09-04 ASSESSMENT — PAIN DESCRIPTION - ORIENTATION
ORIENTATION: LEFT

## 2021-09-04 ASSESSMENT — PAIN DESCRIPTION - FREQUENCY: FREQUENCY: CONTINUOUS

## 2021-09-04 ASSESSMENT — PAIN DESCRIPTION - ONSET: ONSET: ON-GOING

## 2021-09-04 ASSESSMENT — PAIN DESCRIPTION - LOCATION
LOCATION: KNEE

## 2021-09-04 ASSESSMENT — PAIN DESCRIPTION - PROGRESSION: CLINICAL_PROGRESSION: NOT CHANGED

## 2021-09-04 ASSESSMENT — PAIN DESCRIPTION - PAIN TYPE
TYPE: SURGICAL PAIN;ACUTE PAIN
TYPE: SURGICAL PAIN

## 2021-09-04 ASSESSMENT — ENCOUNTER SYMPTOMS
SHORTNESS OF BREATH: 0
COUGH: 0
DIARRHEA: 0

## 2021-09-04 ASSESSMENT — PAIN DESCRIPTION - DESCRIPTORS: DESCRIPTORS: ACHING

## 2021-09-04 NOTE — PLAN OF CARE
Problem: Discharge Planning:  Goal: Discharged to appropriate level of care  Description: Discharged to appropriate level of care  Outcome: Met This Shift     Problem: Mobility - Impaired:  Goal: Mobility will improve  Description: Mobility will improve  Outcome: Met This Shift     Problem: Infection - Surgical Site:  Goal: Will show no infection signs and symptoms  Description: Will show no infection signs and symptoms  Outcome: Met This Shift     Problem: Pain - Acute:  Goal: Pain level will decrease  Description: Pain level will decrease  Outcome: Met This Shift     Problem: Pain:  Goal: Pain level will decrease  Description: Pain level will decrease  Outcome: Met This Shift  Goal: Control of acute pain  Description: Control of acute pain  Outcome: Met This Shift  Goal: Control of chronic pain  Description: Control of chronic pain  Outcome: Met This Shift     Problem: Falls - Risk of:  Goal: Will remain free from falls  Description: Will remain free from falls  Outcome: Met This Shift  Goal: Absence of physical injury  Description: Absence of physical injury  Outcome: Met This Shift     Problem: IP BALANCE  Goal: LTG - Patient will maintain balance to allow for safe/functional mobility  Outcome: Met This Shift     Problem: IP DRESSINGS LOWER EXTREMITIES  Goal: LTG - patient will dress lower body with or without assistive device  Outcome: Met This Shift     Problem: Skin Integrity:  Goal: Will show no infection signs and symptoms  Description: Will show no infection signs and symptoms  Outcome: Met This Shift  Goal: Absence of new skin breakdown  Description: Absence of new skin breakdown  Outcome: Met This Shift

## 2021-09-04 NOTE — PROGRESS NOTES
Progress Note  Date:2021       [facility]  Patient Prachi [facility]     Date of Birth:[birth year]     Age:66 y.o. Subjective    Subjective:  Symptoms:  No shortness of breath, malaise, cough, chest pain, weakness, headache, chest pressure, anorexia or diarrhea. Review of Systems   Respiratory: Negative for cough and shortness of breath. Cardiovascular: Negative for chest pain. Gastrointestinal: Negative for anorexia and diarrhea. Neurological: Negative for weakness. Objective         Vitals Last 24 Hours:  TEMPERATURE:  Temp  Av °F (36.7 °C)  Min: 97.3 °F (36.3 °C)  Max: 98.4 °F (36.9 °C)  RESPIRATIONS RANGE: Resp  Av.3  Min: 18  Max: 20  PULSE OXIMETRY RANGE: SpO2  Av.4 %  Min: 95 %  Max: 97 %  PULSE RANGE: Pulse  Av  Min: 58  Max: 72  BLOOD PRESSURE RANGE: Systolic (07KOO), MPD:947 , Min:102 , QU   ; Diastolic (14LLP), UVC:79, Min:49, Max:71    I/O (24Hr): Intake/Output Summary (Last 24 hours) at 2021 1152  Last data filed at 2021 4404  Gross per 24 hour   Intake 600 ml   Output --   Net 600 ml     Objective:  General Appearance:  Comfortable, well-appearing and in no acute distress. Vital signs: (most recent): Blood pressure (!) 152/62, pulse 67, temperature 98.4 °F (36.9 °C), temperature source Oral, resp. rate 18, height 5' 2\" (1.575 m), weight 197 lb (89.4 kg), SpO2 97 %. HEENT: Normal HEENT exam.    Lungs:  Normal effort. Heart: Normal rate. Regular rhythm. S1 normal and S2 normal.    Abdomen: Abdomen is soft. Bowel sounds are normal.   There is no epigastric area tenderness. Neurological: Patient is alert and oriented to person, place and time. Pupils:  Pupils are equal, round, and reactive to light.       Labs/Imaging/Diagnostics    Labs:  CBC:  Recent Labs     21  0736   WBC 10.5   RBC 3.66*   HGB 10.7*   HCT 31.4*   MCV 85.9   RDW 14.0        CHEMISTRIES:  Recent Labs     21  0736      K 4.5      CO2 23   BUN 20   CREATININE 0.70   GLUCOSE 112*     PT/INR:No results for input(s): PROTIME, INR in the last 72 hours. APTT:No results for input(s): APTT in the last 72 hours. LIVER PROFILE:No results for input(s): AST, ALT, BILIDIR, BILITOT, ALKPHOS in the last 72 hours. Imaging Last 24 Hours:  XR KNEE LEFT (1-2 VIEWS)    Result Date: 9/3/2021  EXAMINATION: XR KNEE LEFT (2 VIEWS) CLINICAL HISTORY: LEFT TOTAL KNEE ARTHROPLASTY COMPARISONS: None available. FINDINGS: Left knee arthroplasty. No fracture. No abnormal lucency bone prosthetic interface. NEGATIVE POSTSURGICAL LEFT KNEE    Assessment//Plan           Hospital Problems         Last Modified POA    S/P total knee arthroplasty, left 9/3/2021 Yes        Assessment & Plan  9/4: Patient can be discharged and follow-up with PCP as outpatient. Counseling was given about tobacco use. No overnight events. No new complaints.   Electronically signed by Maria Beyer MD on 9/4/21 at 11:52 AM EDT

## 2021-09-04 NOTE — PROGRESS NOTES
[facility] OCCUPATIONAL THERAPY ORTHOPEDIC TREATMENT NOTE     Date: 2021  Patient Name: Krystyna Samuel        MRN: [id number]  Account: [de-identified]   : 1955  (78 y.o.)  Room: [place] [address]    Chart Review:  Diagnosis:  There were no encounter diagnoses. Restrictions:    Restrictions/Precautions: Weight Bearing, Fall Risk (High per osorio score)  Required Braces or Orthoses?: Yes  Other position/activity restrictions: L knee immobilizer until +SLR  Pt able to complete +SLE (x5) this day. KI not utilized during session. Subjective:  Patient states: \"My kids pushed me to do my therapy last time. \"  Pain:  Start of tx:  Pain at present: 5  Scale Used: Numeric Score  Intervention List: Patient able to continue with treatment, Patient declined any intervention  Comments / Details: Pt reports that she had pain medication    End of tx:  Patient Currently in Pain: Yes  Pain Assessment: 0-10  Pain Level: 5  Pain Type: Surgical pain, Acute pain  Pain Location: Knee  Pain Orientation: Left  Pain Descriptors: Aching  Pain Frequency: Continuous  Clinical Progression: Not changed  Response to Pain Intervention: Patient Satisfied    Objective: Pt declined full ADL session. Pt agreeable to demonstrating transfers and LB dressing. Pt completed as follows. ADL  ADL  Grooming: Modified independent  (Pt stood at sink to complete oral care)  LE Dressing: Supervision, Increased time to complete (To doff/don bilateral socks. Pt able to bend down in order to doff right sock. Pt utilized dressing stick to doff left sock. Pt utilized sock aid to don bilateral socks. Pt able to use AE with Supervision and occasional VCs for techniques.  Pt reports that she intends to have her  assist her with socks until she is able to bend down in order to manage them.)    Toilet Transfers  Toilet - Technique: Ambulating  Equipment Used: Grab bars  Toilet Transfer: Modified independent     Shower Transfers  Shower - Transfer From: Walker  Shower - Transfer Type: To and From  Shower - Transfer To: Shower seat with back  Shower - Technique: Ambulating  Shower Transfers: Modified independence    RONAK Hose donned: Yes      Therapy key for assistance levels -   Independent = Pt. is able to perform task with no assistance but may require a device   Stand by assistance = Pt. does not perform task at an independent level but does not need physical assistance, requires verbal cues  Minimal, Moderate, Maximal Assistance = Pt. requires physical assistance (25%, 50%, 75% assist from helper) for task but is able to actively participate in task   Dependent = Pt. requires total assistance with task and is not able to actively participate with task completion    Cognition:  Cognition  Overall Cognitive Status: [facility]    Functional Balance:  Sitting Balance: Supervision  Standing Balance: Stand by assistance   Functional - Mobility Device: Rolling Walker  Activity: To/from bathroom  Assist Level: Supervision    Treatment consisted of:  ADL training    Assessment/Discharge Disposition: Pt tolerated treatment well. Pt demonstrates good safety awareness throughout session.    Performance deficits / Impairments: Decreased functional mobility , Decreased balance, Decreased coordination, Decreased ADL status, Decreased endurance, Decreased high-level IADLs, Decreased strength  Prognosis: Good  Discharge Recommendations: Continue to assess pending progress  History: Multiple comorbidities  Exam: 7 performance deficits  Assistance / Modification: Min-Mod A    [employer]  How much help for putting on and taking off regular lower body clothing?: A Little  How much help for Bathing?: A Little  How much help for Toileting?: None  How much help for putting on and taking off regular upper body clothing?: None  How much help for taking care of personal grooming?: None  How much help for eating meals?: None  AM-[facility] Inpatient Daily Activity Raw Score: 22  AM-[facility] Inpatient ADL T-Scale Score : 47.1  ADL Inpatient CMS 0-100% Score: 25.8  ADL Inpatient CMS G-Code Modifier : CJ    Plan:  Continue OT per POC    Goals/Plan of care addressed during this session:  Improve [place] with ADLs and Improve [place] with Functional Transfers    Minutes:  Time In: 7951  Time Out: 0836  Minutes: 13    ADL/IADL trainin minutes    Electronically signed by:    MEGAN Gray    2021, 8:48 AM

## 2021-09-04 NOTE — CARE COORDINATION
MET WITH PATIENT AT BEDSIDE. PT [address] [place] [address]  PT [facility]. STATES PLAN IS FOR HER TO GO TO OUTPT. PHYSICAL THERAPY. DENIES FURTHER NEEDS. PLAN DISCUSSED WITH NURSING. PLAN DC TODAY.

## 2021-09-04 NOTE — PROGRESS NOTES
Physical Therapy Med Surg Daily Treatment Note  Facility/Department: [facility] NEURO  Room: P795/U914-48       NAME: Bobbi Mueller  :  (1 y.o.)  MRN: [id number]  CODE STATUS: Full Code    Date of Service: 2021    Patient Diagnosis(es): S/P total knee arthroplasty, left [Z96.652]   No chief complaint on file.     Patient Active Problem List    Diagnosis Date Noted    S/P total knee arthroplasty, left 2021    Hand pain 2021    Rupture of ulnar collateral ligament of thumb 2021    Status post total knee replacement, right 2021    Ataxic gait 2020    History of diabetes mellitus 09/15/2020    Knee pain 09/15/2020    Personal history of malignant neoplasm 09/15/2020    Primary osteoarthritis of both knees 09/15/2020    Shoulder joint pain 09/15/2020    Radicular pain in right arm 2020    Cervical radiculopathy 2020    Adhesive capsulitis of both shoulders 2017    Herniated nucleus pulposus, C5-6 2017    Herniated nucleus pulposus, C6-7 left 2017    Smoker 2017    Uncontrolled diabetes mellitus type 2 without complications     Obesity 2016    Vitamin B12 deficiency 2016    DM2 (diabetes mellitus, type 2) ([address] 2015    HTN (hypertension) 2015    Hypothyroid 2015    Hyperlipemia, mixed     Metabolic syndrome     Ingrowing nail 2009        Past Medical History:   Diagnosis Date    Breast cancer ([address]     Cancer ([address]     Depression     Hyperlipidemia     Hypertension     Thyroid disease     Type II diabetes mellitus, uncontrolled ([address]      Past Surgical History:   Procedure Laterality Date    BREAST LUMPECTOMY Left      SECTION       SECTION  1985    TOTAL KNEE ARTHROPLASTY Right 2021    RIGHT TOTAL KNEE ARTHROPLASTY WITH TIBIAL STEMS SUPINE; Gae Faster PERSONA TIBIAL STEM CONSTANTIN-NARCISO performed by Phyllis Plascencia MD at [address] [facility] Left 9/3/2021    LEFT TOTAL KNEE ARTHROPLASTY. SUPINE;  23 HR BEDDED OBS; CONSTANTIN, [id number] RAMÍREZ STUART performed by Ricky Garcia MD at [address] [place] [address]  Restrictions/Precautions: Weight Bearing; Fall Risk (High per osorio score)  Lower Extremity Weight Bearing Restrictions  Left Lower Extremity Weight Bearing: Weight Bearing As Tolerated  Required Braces or Orthoses  Left Lower Extremity Brace: Knee Immobilizer (until +SLR (SLR noted 9/4))    SUBJECTIVE   General  Chart Reviewed: Yes  Family / Caregiver Present: No  Subjective  Subjective: \"hopefully I am getting out of here soon. \"    Pre-Session Pain Report  Pre Treatment Pain Screening  Pain at present: 7  Scale Used: Numeric Score  Intervention List: Patient able to continue with treatment  Pain Screening  Patient Currently in Pain: Yes       Post-Session Pain Report  Pain Assessment  Pain Assessment: 0-10  Pain Level: 7  Pain Type: Surgical pain  Pain Location: Knee  Pain Orientation: Left         OBJECTIVE        Bed mobility  Supine to Sit: Supervision  Sit to Supine: Supervision  Comment: impulsive but steady. Transfers  Sit to Stand: Supervision  Stand to sit: Supervision  Comment: [employer], vc's for improved safety awarenss. impulsive. Ambulation  Ambulation?: Yes  Ambulation 1  Surface: level tile  Device: Rolling Walker  Assistance: Supervision  Quality of Gait: antalgic step through reciprocal pattern L  Gait Deviations: Slow Nerissa  Distance: 150'                 Exercises  Comments: reviewed written HEP, pt demos understanding stating she recalls from previous TKA                    Activity Tolerance  Activity Tolerance: Patient Tolerated treatment well          ASSESSMENT   Assessment: pt able to increase ambulation distance without issue, pt voices no concerns regarding homegoing at [facility].      Discharge Recommendations:  Continue to assess pending progress    Goals  Short term goals  Short

## 2021-09-04 NOTE — DISCHARGE SUMMARY
Discharge summary  This patient Therese Gilbert was admitted to the hospital on 9/3/2021  after undergoing Procedure(s) (LRB):  LEFT TOTAL KNEE ARTHROPLASTY. SUPINE;  23 HR BEDDED OBS; CONSTANTIN, [id number] PERSONSALLIE STUART (Left) without complications that morning. During the postoperative period,while in hospital, patient was medically managed by the hospitalist. Please see medial notes and H&P for patients additional diagnoses. Ortho agrees with all medical diagnoses and treatments while patient in hospital.  No significant or unexpected findings or abnormal ortho imaging were noted during the hospital stay    Hospital course  Patient tolerated surgical procedure well and there was no complications. Patient progressed adequtly through their recovery during hospital stay including PT and rehabilitation. DVT prophylaxis was implemented POD#1  Patient was then D/C on  to Home with outpatient physical therapy  in stable condition. Patient was instructed on the use of pain medications, the signs and symptoms of infection, and was given our number to call should they have any questions or concerns following discharge. Patient with acute blood loss anemia post-operatively from surgery with hgb of 10.7 this morning. The patient remains asymptomatic denying any chest pain, shortness of breath, dizziness, lightheadedness, fatigue. There are no signs of acute bleeding and she remains hemodynamically stable. Continued to monitor throughout hospital stay. EXAMINATION: XR KNEE LEFT (2 VIEWS)       CLINICAL HISTORY: LEFT TOTAL KNEE ARTHROPLASTY       COMPARISONS: None available.       FINDINGS: Left knee arthroplasty. No fracture.  No abnormal lucency bone prosthetic interface.           Impression   NEGATIVE POSTSURGICAL LEFT KNEE

## 2021-09-04 NOTE — PROGRESS NOTES
Physical Therapy Med Surg Daily Treatment Note  Facility[facility]Department: [facility]  Room: O99/F559-48       NAME: Akbar Spears  :  (50 y.o.)  MRN: [id number]  CODE STATUS: Full Code    Date of Service: 2021    Patient Diagnosis(es): S/P total knee arthroplasty, left [Z96.652]   No chief complaint on file.     Patient Active Problem List    Diagnosis Date Noted    S/P total knee arthroplasty, left 2021    Hand pain 2021    Rupture of ulnar collateral ligament of thumb 2021    Status post total knee replacement, right 2021    Ataxic gait 2020    History of diabetes mellitus 09/15/2020    Knee pain 09/15/2020    Personal history of malignant neoplasm 09/15/2020    Primary osteoarthritis of both knees 09/15/2020    Shoulder joint pain 09/15/2020    Radicular pain in right arm 2020    Cervical radiculopathy 2020    Adhesive capsulitis of both shoulders 2017    Herniated nucleus pulposus, C5-6 2017    Herniated nucleus pulposus, C6-7 left 2017    Smoker 2017    Uncontrolled diabetes mellitus type 2 without complications     Obesity 2016    Vitamin B12 deficiency 2016    DM2 (diabetes mellitus, type 2) ([address] 2015    HTN (hypertension) 2015    Hypothyroid 2015    Hyperlipemia, mixed     Metabolic syndrome     Ingrowing nail 2009        Past Medical History:   Diagnosis Date    Breast cancer ([address]     Cancer ([address]     Depression     Hyperlipidemia     Hypertension     Thyroid disease     Type II diabetes mellitus, uncontrolled ([address]      Past Surgical History:   Procedure Laterality Date    BREAST LUMPECTOMY Left 1996     SECTION  1981     SECTION  1985    TOTAL KNEE ARTHROPLASTY Right 2021    RIGHT TOTAL KNEE ARTHROPLASTY WITH TIBIAL STEMS SUPINE; Ricardo BROWNA TIBIAL STEM CONSTANTIN-NARCISO performed by Ricky Garcia MD at [facility] Left 9/3/2021    LEFT TOTAL KNEE ARTHROPLASTY. SUPINE;  23 HR BEDDED OBS; CONSTANTIN, [id number] RAMÍREZ NARCISO performed by Jose Hernandez MD at [address] [place] [address]  Restrictions/Precautions: Weight Bearing; Fall Risk (High per osorio score)  Lower Extremity Weight Bearing Restrictions  Left Lower Extremity Weight Bearing: Weight Bearing As Tolerated  Required Braces or Orthoses  Left Lower Extremity Brace: Knee Immobilizer (until +SLR (SLR noted 9/4))    SUBJECTIVE   General  Chart Reviewed: Yes  Family / Caregiver Present: No  Subjective  Subjective: \"I had my other one done, I'm doing okay. \"    Pre-Session Pain Report  Pre Treatment Pain Screening  Pain at present: 7  Scale Used: Numeric Score  Intervention List: Patient able to continue with treatment  Pain Screening  Patient Currently in Pain: Yes       Post-Session Pain Report  Pain Assessment  Pain Assessment: 0-10  Pain Level: 7  Pain Type: Surgical pain  Pain Location: Knee  Pain Orientation: Left         OBJECTIVE        Bed mobility  Supine to Sit: Supervision  Sit to Supine: Supervision  Comment: impulsive but steady. Transfers  Sit to Stand: Supervision  Stand to sit: Supervision  Car Transfer: Stand by assistance  Comment: [facility] 's for improved safety awarenss. impulsive. Ambulation  Ambulation?: Yes  Ambulation 1  Surface: level tile  Device: Rolling Walker  Assistance: Stand by assistance  Quality of Gait: antalgic step through reciprocal pattern L  Gait Deviations: Slow Nerissa  Distance: 40ft x2    Stairs/Curb  Stairs?: Yes  Stairs  # Steps : 4  Stairs Height: 6\"  Rails: Bilateral  Device: No Device  Assistance: Supervision  Comment: good ability pt reports confidence completing hers at home. Exercises  Comments: pt given written HEP for supine/seated therex, instructed on technique dosage and frequency of all exercises, pt verbalizes understanding.                     Activity Tolerance  Activity Tolerance: Patient Tolerated treatment well          ASSESSMENT   Assessment: pt with good mobility safe on stairs and car, verbalizes understanding of HEP. Discharge Recommendations:  Continue to assess pending progress    Goals  Short term goals  Short term goal 1: Pt will demonstrate HEP indep  Short term goal 2: Pt will demonstrate L knee AROM 0-90 deg  Long term goals  Long term goal 1: Pt will demonstrate bed mobility indep. Long term goal 2: Pt will demonstrate transfers mod indep with safest AD  Long term goal 3: Pt will demonstrate amb mod indep with safest AD 100ft  Long term goal 4: Pt will demonstrate stair negotiation 4 steps with 1 rail mod indep    PLAN    Times per week: 5-7  Times per day: Twice a day  Safety Devices  Type of devices: All fall risk precautions in place, Call light within reach, Left in bed     [facility] (6 CLICK) [address] Inpatient Mobility Raw Score : 18      Therapy Time   Individual   Time In 0847   Time Out 0900   Minutes 13      Gait: 8  BM/TrsF: [facility], 09/04/21 at 9:50 AM         Definitions for assistance levels  Independent = pt does not require any physical supervision or assistance from another person for activity completion. Device may be needed.   Stand by assistance = pt requires verbal cues or instructions from another person, close to but not touching, to perform the activity  Minimal assistance= pt performs 75% or more of the activity; assistance is required to complete the activity  Moderate assistance= pt performs 50% of the activity; assistance is required to complete the activity  Maximal assistance = pt performs 25% of the activity; assistance is required to complete the activity  Dependent = pt requires total physical assistance to accomplish the task

## 2021-09-04 NOTE — PROGRESS NOTES
Patient doing great status post left total knee arthroplasty. Pain adequately controlled. Denies fevers chills chest pain or shortness of breath. Dressing clean and dry. Distally neurovascularly intact to left lower extremity. Postoperative day #1 status post left total knee arthroplasty, doing well        Continue current orthopedic care  Discharge to home later today.

## 2021-09-08 NOTE — PROGRESS NOTES
Physical Therapy  Facility/Department: [facility] L170/T843-94  Physical Therapy Discharge      NAME: Audrey Ferguson    :  (77 y.o.)  MRN: [id number]    Account: [de-identified]  Gender: female      Patient has been discharged from acute care hospital. DC patient from current PT program.      Electronically signed by Butch Ayala PT on 21 at 3:54 PM EDT

## 2021-12-24 DIAGNOSIS — E11.65 UNCONTROLLED TYPE 2 DIABETES MELLITUS WITH HYPERGLYCEMIA (HCC): ICD-10-CM

## 2021-12-24 DIAGNOSIS — E03.9 ACQUIRED HYPOTHYROIDISM: ICD-10-CM

## 2021-12-27 RX ORDER — LEVOTHYROXINE SODIUM 0.1 MG/1
TABLET ORAL
Qty: 90 TABLET | Refills: 3 | Status: SHIPPED | OUTPATIENT
Start: 2021-12-27 | End: 2022-07-20 | Stop reason: SDUPTHER

## 2022-01-18 DIAGNOSIS — E11.65 UNCONTROLLED TYPE 2 DIABETES MELLITUS WITH HYPERGLYCEMIA (HCC): ICD-10-CM

## 2022-01-18 DIAGNOSIS — E03.9 ACQUIRED HYPOTHYROIDISM: ICD-10-CM

## 2022-01-18 LAB
ANION GAP SERPL CALCULATED.3IONS-SCNC: 16 MEQ/L (ref 9–15)
BUN BLDV-MCNC: 17 MG/DL (ref 8–23)
CALCIUM SERPL-MCNC: 9.3 MG/DL (ref 8.5–9.9)
CHLORIDE BLD-SCNC: 105 MEQ/L (ref 95–107)
CO2: 21 MEQ/L (ref 20–31)
CREAT SERPL-MCNC: 0.54 MG/DL (ref 0.5–0.9)
CREATININE URINE: 115.7 MG/DL
GFR AFRICAN AMERICAN: >60
GFR NON-AFRICAN AMERICAN: >60
GLUCOSE BLD-MCNC: 112 MG/DL (ref 70–99)
HBA1C MFR BLD: 6.2 % (ref 4.8–5.9)
MICROALBUMIN UR-MCNC: <1.2 MG/DL
MICROALBUMIN/CREAT UR-RTO: NORMAL MG/G (ref 0–30)
POTASSIUM SERPL-SCNC: 4.1 MEQ/L (ref 3.4–4.9)
SODIUM BLD-SCNC: 142 MEQ/L (ref 135–144)
T4 FREE: 1.39 NG/DL (ref 0.84–1.68)
TSH SERPL DL<=0.05 MIU/L-ACNC: 1.85 UIU/ML (ref 0.44–3.86)

## 2022-01-18 RX ORDER — ATORVASTATIN CALCIUM 10 MG/1
TABLET, FILM COATED ORAL
Qty: 90 TABLET | Refills: 1 | Status: SHIPPED | OUTPATIENT
Start: 2022-01-18 | End: 2022-07-18

## 2022-01-20 ENCOUNTER — OFFICE VISIT (OUTPATIENT)
Dept: ENDOCRINOLOGY | Age: 67
End: 2022-01-20
Payer: MEDICARE

## 2022-01-20 VITALS
BODY MASS INDEX: 36.44 KG/M2 | HEART RATE: 74 BPM | SYSTOLIC BLOOD PRESSURE: 150 MMHG | OXYGEN SATURATION: 96 % | DIASTOLIC BLOOD PRESSURE: 84 MMHG | HEIGHT: 62 IN | WEIGHT: 198 LBS

## 2022-01-20 DIAGNOSIS — E03.9 ACQUIRED HYPOTHYROIDISM: ICD-10-CM

## 2022-01-20 DIAGNOSIS — E11.65 UNCONTROLLED TYPE 2 DIABETES MELLITUS WITH HYPERGLYCEMIA (HCC): Primary | ICD-10-CM

## 2022-01-20 DIAGNOSIS — E66.01 SEVERE OBESITY (BMI 35.0-35.9 WITH COMORBIDITY) (HCC): ICD-10-CM

## 2022-01-20 LAB
CHP ED QC CHECK: NORMAL
GLUCOSE BLD-MCNC: 180 MG/DL

## 2022-01-20 PROCEDURE — 99213 OFFICE O/P EST LOW 20 MIN: CPT | Performed by: INTERNAL MEDICINE

## 2022-01-20 PROCEDURE — 82962 GLUCOSE BLOOD TEST: CPT | Performed by: INTERNAL MEDICINE

## 2022-01-20 NOTE — PROGRESS NOTES
1/20/2022    Assessment:       Diagnosis Orders   1. Uncontrolled type 2 diabetes mellitus with hyperglycemia (HCC)  POCT Glucose   2. Acquired hypothyroidism           PLAN:     Orders Placed This Encounter   Procedures    Lipid, Fasting     Standing Status:   Future     Standing Expiration Date:   1/20/2023    T4, Free     Standing Status:   Future     Standing Expiration Date:   1/20/2023    TSH with Reflex     Standing Status:   Future     Standing Expiration Date:   1/20/2023    Hemoglobin A1C     Standing Status:   Future     Standing Expiration Date:   1/20/2023    Basic Metabolic Panel, Fasting     Standing Status:   Future     Standing Expiration Date:   1/20/2023    POCT Glucose     Continue Metformin 500 mg twice a day continue Synthroid 100 mcg daily follow-up in 3 to 6 months    Orders Placed This Encounter   Procedures    POCT Glucose       Subjective:     Chief Complaint   Patient presents with    Diabetes    Hypothyroidism     Vitals:    01/20/22 1115 01/20/22 1117   BP: (!) 152/84 (!) 150/84   Pulse: 74    SpO2: 96%    Weight: 198 lb (89.8 kg)    Height: 5' 2\" (1.575 m)      Wt Readings from Last 3 Encounters:   01/20/22 198 lb (89.8 kg)   08/31/21 197 lb (89.4 kg)   09/03/21 197 lb (89.4 kg)     BP Readings from Last 3 Encounters:   01/20/22 (!) 150/84   08/31/21 (!) 138/46   09/04/21 (!) 152/62     Follow-up on type 2 diabetes hypothyroidism history of obesity lab review  For diabetes patient on Metformin 500 mg twice daily  A1c was 6.2    Diabetes  She presents for her follow-up diabetic visit. She has type 2 diabetes mellitus. Her disease course has been stable. Risk factors for coronary artery disease include obesity. Current diabetic treatment includes oral agent (monotherapy). Her overall blood glucose range is 130-140 mg/dl.  (Lab Results       Component                Value               Date                       LABA1C                   6.2 (H)             01/18/2022            ) Past Medical History:   Diagnosis Date    [facility] ([facility]     Cancer ([facility]     Depression     Hyperlipidemia     Hypertension     Thyroid disease     Type II diabetes mellitus, uncontrolled ([facility]      Past Surgical History:   Procedure Laterality Date    BREAST LUMPECTOMY Left      SECTION       SECTION      TOTAL KNEE ARTHROPLASTY Right 2021    RIGHT TOTAL KNEE ARTHROPLASTY WITH TIBIAL STEMS SUPINE; CONSTANTIN PERSONA TIBIAL STEM CONSTANTIN-NARCISO performed by Marnie Lema MD at [address] Left 9/3/2021    LEFT TOTAL KNEE ARTHROPLASTY. SUPINE;  23 HR BEDDED OBS; CONSTANTIN, [id number] PERSONA. NARCISO performed by Marnie Lema MD at [address] [facility] Marital status:      Spouse name: Not on file    Number of children: Not on file    Years of education: Not on file    Highest education level: Not on file   Occupational History    Not on file   Tobacco Use    Smoking status: Current Every Day Smoker     Packs/day: 1.00     Years: 40.00     Pack years: 40.00     Types: Cigarettes    Smokeless tobacco: Never Used    Tobacco comment: trying to cut back   Substance and Sexual Activity    Alcohol use: Never     Alcohol/week: 0.0 standard drinks    Drug use: Not Currently     Types: Marijuana Veldon Bunting)    Sexual activity: Not on file   Other Topics Concern    Not on file   Social History Narrative    Not on file     Social Determinants of Health     Financial Resource Strain:     Difficulty of Paying Living Expenses: Not on file   Food Insecurity:     Worried About Running Out of Food in the Last Year: Not on file    Crispin of Food in the Last Year: Not on file   Transportation Needs:     Lack of Transportation (Medical): Not on file    Lack of Transportation (Non-Medical):  Not on file   Physical Activity:     Days of Exercise per Week: Not on file    Minutes of Exercise per Session: Not on file   Stress:     Feeling of Stress : Not on file   Social Connections:     Frequency of Communication with Friends and Family: Not on file    Frequency of Social Gatherings with Friends and Family: Not on file    Attends Zoroastrianism Services: Not on file    Active Member of Clubs or Organizations: Not on file    Attends Club or Organization Meetings: Not on file    Marital Status: Not on file   Intimate Partner Violence:     Fear of Current or Ex-Partner: Not on file    Emotionally Abused: Not on file    Physically Abused: Not on file    Sexually Abused: Not on file   Housing Stability:     Unable to Pay for Housing in the Last Year: Not on file    Number of [place] in the Last Year: Not on file    Unstable Housing in the Last Year: Not on file     Family History   Problem Relation Age of Onset    Heart Disease Mother     Cancer Father      No Known Allergies    Current Outpatient Medications:     atorvastatin (LIPITOR) 10 MG tablet, TAKE 1 TABLET DAILY, Disp: 90 tablet, Rfl: 1    levothyroxine (SYNTHROID) 100 MCG tablet, TAKE 1 TABLET DAILY, Disp: 90 tablet, Rfl: 3    metFORMIN (GLUCOPHAGE) 500 MG tablet, TAKE 1 TABLET TWICE A DAY WITH MEALS, Disp: 180 tablet, Rfl: 3    HYDROcodone-acetaminophen (NORCO) 5-325 MG per tablet, Take by mouth., Disp: , Rfl:     sertraline (ZOLOFT) 100 MG tablet, TAKE 1 TABLET DAILY, Disp: 90 tablet, Rfl: 3    amLODIPine (NORVASC) 10 MG tablet, Once a day, Disp: 90 tablet, Rfl: 3    blood glucose monitor strips, Test BG once daily, DX: E11.9, NIDDM, Disp: 50 strip, Rfl: 6    Lancets MISC, Test BG once daily, DX: E11.65, NIDDM, Disp: 100 each, Rfl: 3    Syringe/Needle, Disp, (SYRINGE 3CC/25GX1\") 25G X 1\" 3 ML MISC, Use for monthly B-12 injection, Disp: 10 each, Rfl: 3    Blood Glucose Monitoring Suppl MIGUEL ANGEL, Test BG once daily, DX: E11.65, NIDDM, Disp: 1 Device, Rfl: 0    Alcohol Swabs PADS, Bid, Disp: 100 each, Rfl: 06    aspirin 81 MG EC tablet, Take 1 tablet by mouth 2 times daily for 14 days, Disp: 30 tablet, Rfl: 0    ibuprofen (ADVIL;MOTRIN) 800 MG tablet, Take 1 tablet by mouth every 6 hours as needed for Pain, Disp: 360 tablet, Rfl: 0  Lab Results   Component Value Date     01/18/2022    K 4.1 01/18/2022     01/18/2022    CO2 21 01/18/2022    BUN 17 01/18/2022    CREATININE 0.54 01/18/2022    GLUCOSE 180 01/20/2022    CALCIUM 9.3 01/18/2022    PROT 7.1 05/15/2019    LABALBU 4.2 05/15/2019    BILITOT 0.4 05/15/2019    ALKPHOS 54 05/15/2019    AST 10 05/15/2019    ALT 13 05/15/2019    LABGLOM >60.0 01/18/2022    GFRAA >60.0 01/18/2022     Lab Results   Component Value Date    WBC 10.5 09/04/2021    HGB 10.7 (L) 09/04/2021    HCT 31.4 (L) 09/04/2021    MCV 85.9 09/04/2021     09/04/2021     Lab Results   Component Value Date    LABA1C 6.2 (H) 01/18/2022    LABA1C 6.2 (H) 07/20/2021    LABA1C 6.2 (H) 11/30/2020     Lab Results   Component Value Date    HDL 37 (L) 11/30/2020    HDL 33 (L) 05/18/2020    HDL 34 (L) 11/21/2019    LDLCALC 54 11/30/2020    LDLCALC 86 05/18/2020    LDLCALC 45 11/21/2019    CHOL 126 11/30/2020    CHOL 171 05/18/2020    CHOL 121 11/21/2019    TRIG 176 (H) 11/30/2020    TRIG 261 (H) 05/18/2020    TRIG 208 (H) 11/21/2019     No results found for: TESTM  Lab Results   Component Value Date    TSH 1.850 01/18/2022    TSH 1.730 11/30/2020    TSH 2.030 05/18/2020    TSHREFLEX 1.340 07/20/2021    T4FREE 1.39 01/18/2022    T4FREE 1.40 07/20/2021    T4FREE 1.30 11/30/2020     No results found for: TPOABS    Review of Systems   Cardiovascular: Negative. Musculoskeletal: Positive for arthralgias. All other systems reviewed and are negative. Objective:   Physical Exam  Vitals reviewed. Constitutional:       Appearance: Normal appearance. She is obese. HENT:      Head: Normocephalic and atraumatic.  Hair is normal.      Right Ear: External ear normal.      Left Ear: External ear normal.      Nose: Nose normal.   Eyes: General: No scleral icterus. Right eye: No discharge. Left eye: No discharge. Extraocular Movements: Extraocular movements intact. Conjunctiva/sclera: Conjunctivae normal.   Neck:      Trachea: Trachea normal.   Cardiovascular:      Rate and Rhythm: Normal rate. Pulmonary:      Effort: Pulmonary effort is normal.   Musculoskeletal:         General: Normal range of motion. Cervical back: Normal range of motion and neck supple. Neurological:      General: No focal deficit present. Mental Status: She is alert and oriented to person, place, and time.    Psychiatric:         Mood and Affect: Mood normal.         Behavior: Behavior normal.

## 2022-03-01 RX ORDER — AMLODIPINE BESYLATE 10 MG/1
TABLET ORAL
Qty: 90 TABLET | Refills: 3 | Status: SHIPPED | OUTPATIENT
Start: 2022-03-01

## 2022-07-07 RX ORDER — SERTRALINE HYDROCHLORIDE 100 MG/1
TABLET, FILM COATED ORAL
Qty: 90 TABLET | Refills: 3 | Status: SHIPPED | OUTPATIENT
Start: 2022-07-07

## 2022-07-18 DIAGNOSIS — E03.9 ACQUIRED HYPOTHYROIDISM: ICD-10-CM

## 2022-07-18 DIAGNOSIS — E11.65 UNCONTROLLED TYPE 2 DIABETES MELLITUS WITH HYPERGLYCEMIA (HCC): ICD-10-CM

## 2022-07-18 LAB
ANION GAP SERPL CALCULATED.3IONS-SCNC: 14 MEQ/L (ref 9–15)
BUN BLDV-MCNC: 15 MG/DL (ref 8–23)
CALCIUM SERPL-MCNC: 9.3 MG/DL (ref 8.5–9.9)
CHLORIDE BLD-SCNC: 103 MEQ/L (ref 95–107)
CHOLESTEROL, FASTING: 135 MG/DL (ref 0–199)
CO2: 21 MEQ/L (ref 20–31)
CREAT SERPL-MCNC: 0.72 MG/DL (ref 0.5–0.9)
GFR AFRICAN AMERICAN: >60
GFR NON-AFRICAN AMERICAN: >60
GLUCOSE FASTING: 115 MG/DL (ref 70–99)
HBA1C MFR BLD: 6.2 % (ref 4.8–5.9)
HDLC SERPL-MCNC: 34 MG/DL (ref 40–59)
LDL CHOLESTEROL CALCULATED: 54 MG/DL (ref 0–129)
POTASSIUM SERPL-SCNC: 3.5 MEQ/L (ref 3.4–4.9)
SODIUM BLD-SCNC: 138 MEQ/L (ref 135–144)
T4 FREE: 1.35 NG/DL (ref 0.84–1.68)
TRIGLYCERIDE, FASTING: 234 MG/DL (ref 0–150)
TSH REFLEX: 0.94 UIU/ML (ref 0.44–3.86)

## 2022-07-18 RX ORDER — ATORVASTATIN CALCIUM 10 MG/1
TABLET, FILM COATED ORAL
Qty: 90 TABLET | Refills: 3 | Status: SHIPPED | OUTPATIENT
Start: 2022-07-18

## 2022-07-20 ENCOUNTER — HOSPITAL ENCOUNTER (OUTPATIENT)
Dept: WOMENS IMAGING | Age: 67
Discharge: HOME | End: 2022-07-22
Payer: MEDICARE

## 2022-07-20 ENCOUNTER — OFFICE VISIT (OUTPATIENT)
Dept: ENDOCRINOLOGY | Age: 67
End: 2022-07-20
Payer: MEDICARE

## 2022-07-20 VITALS
BODY MASS INDEX: 36.44 KG/M2 | DIASTOLIC BLOOD PRESSURE: 84 MMHG | HEIGHT: 62 IN | WEIGHT: 198 LBS | HEART RATE: 71 BPM | OXYGEN SATURATION: 93 % | SYSTOLIC BLOOD PRESSURE: 130 MMHG

## 2022-07-20 DIAGNOSIS — E03.9 ACQUIRED HYPOTHYROIDISM: ICD-10-CM

## 2022-07-20 DIAGNOSIS — Z12.31 BREAST CANCER SCREENING BY MAMMOGRAM: ICD-10-CM

## 2022-07-20 DIAGNOSIS — E11.65 UNCONTROLLED TYPE 2 DIABETES MELLITUS WITH HYPERGLYCEMIA (HCC): Primary | ICD-10-CM

## 2022-07-20 LAB
CHP ED QC CHECK: NORMAL
GLUCOSE BLD-MCNC: 134 MG/DL

## 2022-07-20 PROCEDURE — 82962 GLUCOSE BLOOD TEST: CPT | Performed by: INTERNAL MEDICINE

## 2022-07-20 PROCEDURE — 77067 SCR MAMMO BI INCL CAD: CPT

## 2022-07-20 PROCEDURE — 1123F ACP DISCUSS/DSCN MKR DOCD: CPT | Performed by: INTERNAL MEDICINE

## 2022-07-20 PROCEDURE — 3044F HG A1C LEVEL LT 7.0%: CPT | Performed by: INTERNAL MEDICINE

## 2022-07-20 PROCEDURE — 99213 OFFICE O/P EST LOW 20 MIN: CPT | Performed by: INTERNAL MEDICINE

## 2022-07-20 RX ORDER — LEVOTHYROXINE SODIUM 0.1 MG/1
TABLET ORAL
Qty: 90 TABLET | Refills: 3 | Status: SHIPPED | OUTPATIENT
Start: 2022-07-20

## 2022-07-20 NOTE — PROGRESS NOTES
7/20/2022    Assessment:       Diagnosis Orders   1. Uncontrolled type 2 diabetes mellitus with hyperglycemia (HCC)  POCT Glucose    Basic Metabolic Panel    Hemoglobin A1C    metFORMIN (GLUCOPHAGE) 500 MG tablet      2. Acquired hypothyroidism  T4, Free    TSH    levothyroxine (SYNTHROID) 100 MCG tablet      3.  Encounter to establish care  [facility]            PLAN:     Orders Placed This Encounter   Procedures    Basic Metabolic Panel     Standing Status:   Future     Standing Expiration Date:   7/20/2023    Hemoglobin A1C     Standing Status:   Future     Standing Expiration Date:   7/20/2023    T4, Free     Standing Status:   Future     Standing Expiration Date:   7/20/2023    TSH     Standing Status:   Future     Standing Expiration Date:   7/20/2023    [facility]     Referral Priority:   Routine     Referral Type:   Eval and Treat     Referral Reason:   Specialty Services Required     Number of Visits Requested:   1    POCT Glucose     Orders Placed This Encounter   Medications    levothyroxine (SYNTHROID) 100 MCG tablet     Sig: TAKE 1 TABLET DAILY     Dispense:  90 tablet     Refill:  3    metFORMIN (GLUCOPHAGE) 500 MG tablet     Sig: bid     Dispense:  180 tablet     Refill:  3         Orders Placed This Encounter   Procedures    POCT Glucose     Subjective:     Chief Complaint   Patient presents with    Diabetes     Vitals:    07/20/22 1114   BP: 130/84   Site: Right Upper Arm   Position: Sitting   Cuff Size: Large Adult   Pulse: 71   SpO2: 93%   Weight: 198 lb (89.8 kg)   Height: 5' 2\" (1.575 m)     Wt Readings from Last 3 Encounters:   07/20/22 198 lb (89.8 kg)   01/20/22 198 lb (89.8 kg)   08/31/21 197 lb (89.4 kg)     BP Readings from Last 3 Encounters:   07/20/22 130/84   01/20/22 (!) 150/84   08/31/21 (!) 138/46     Follow-up on type 2 diabetes obesity hypothyroidism lab review A1c has been stable at 6.2  Thyroid replacement thyroid function test stable denies any heat or cold intolerance    Diabetes  She presents for her follow-up diabetic visit. She has type 2 diabetes mellitus. Symptoms are stable. Risk factors for coronary artery disease include obesity. Current diabetic treatment includes oral agent (monotherapy). Her overall blood glucose range is 110-130 mg/dl. Past Medical History:   Diagnosis Date    Bre[facility] cancer ([facility]     [facility]     Depression     Hyperlipidemia     Hypertension     Thyroid disease     Type II diabetes mellitus, uncontrolled ([facility]      Past Surgical History:   Procedure Laterality Date    BREAST LUMPECTOMY Left [address] Right 2/19/2021    RIGHT TOTAL KNEE ARTHROPLASTY WITH TIBIAL STEMS SUPINE; CONSTANTIN PERSONA TIBIAL STEM CONSTANTIN-NARCISO performed by Oleg Reid MD at [address] Left 9/3/2021    LEFT TOTAL KNEE ARTHROPLASTY. SUPINE;  23 HR BEDDED OBS; CONSTANTIN, [id number] PERSONA.  NARCISO performed by Oleg Reid MD at [address] [place] History     Socioeconomic History    Marital status:      Spouse name: Not on file    Number of children: Not on file    Years of education: Not on file    Highest education level: Not on file   Occupational History    Not on file   Tobacco Use    Smoking status: Every Day     Packs/day: 1.00     Years: 40.00     Pack years: 40.00     Types: Cigarettes    Smokeless tobacco: Never    Tobacco comments:     trying to cut back   Substance and Sexual Activity    Alcohol use: Never     Alcohol/week: 0.0 standard drinks    Drug use: Not Currently     Types: Marijuana Deo Louie)    Sexual activity: Not on file   Other Topics Concern    Not on file   Social History Narrative    Not on file     Social Determinants of Health     Financial Resource Strain: Not on file   Food Insecurity: Not on file   Transportation Needs: Not on file   Physical Activity: Not on file   Stress: Not on file   Social Connections: Not on file Intimate Partner Violence: Not on file   Housing Stability: Not on file     Family History   Problem Relation Age of Onset    Heart Disease Mother     Cancer Father      No Known Allergies    Current Outpatient Medications:     atorvastatin (LIPITOR) 10 MG tablet, TAKE 1 TABLET DAILY, Disp: 90 tablet, Rfl: 3    sertraline (ZOLOFT) 100 MG tablet, TAKE 1 TABLET DAILY, Disp: 90 tablet, Rfl: 3    amLODIPine (NORVASC) 10 MG tablet, TAKE 1 TABLET DAILY, Disp: 90 tablet, Rfl: 3    levothyroxine (SYNTHROID) 100 MCG tablet, TAKE 1 TABLET DAILY, Disp: 90 tablet, Rfl: 3    metFORMIN (GLUCOPHAGE) 500 MG tablet, TAKE 1 TABLET TWICE A DAY WITH MEALS, Disp: 180 tablet, Rfl: 3    blood glucose monitor strips, Test BG once daily, DX: E11.9, NIDDM, Disp: 50 strip, Rfl: 6    Lancets MISC, Test BG once daily, DX: E11.65, NIDDM, Disp: 100 each, Rfl: 3    Syringe/Needle, Disp, (SYRINGE 3CC/25GX1\") 25G X 1\" 3 ML MISC, Use for monthly B-12 injection, Disp: 10 each, Rfl: 3    Blood Glucose Monitoring Suppl MIGUEL ANGEL, Test BG once daily, DX: E11.65, NIDDM, Disp: 1 Device, Rfl: 0    Alcohol Swabs PADS, Bid, Disp: 100 each, Rfl: 06    aspirin 81 MG EC tablet, Take 1 tablet by mouth 2 times daily for 14 days, Disp: 30 tablet, Rfl: 0    ibuprofen (ADVIL;MOTRIN) 800 MG tablet, Take 1 tablet by mouth every 6 hours as needed for Pain, Disp: 360 tablet, Rfl: 0  Lab Results   Component Value Date     07/18/2022    K 3.5 07/18/2022     07/18/2022    CO2 21 07/18/2022    BUN 15 07/18/2022    CREATININE 0.72 07/18/2022    GLUCOSE 134 07/20/2022    CALCIUM 9.3 07/18/2022    PROT 7.1 05/15/2019    LABALBU 4.2 05/15/2019    BILITOT 0.4 05/15/2019    ALKPHOS 54 05/15/2019    AST 10 05/15/2019    ALT 13 05/15/2019    LABGLOM >60.0 07/18/2022    GFRAA >60.0 07/18/2022     Lab Results   Component Value Date    WBC 10.5 09/04/2021    HGB 10.7 (L) 09/04/2021    HCT 31.4 (L) 09/04/2021    MCV 85.9 09/04/2021     09/04/2021     Lab Results Component Value Date    LABA1C 6.2 (H) 07/18/2022    LABA1C 6.2 (H) 01/18/2022    LABA1C 6.2 (H) 07/20/2021     Lab Results   Component Value Date    CHOLFAST 135 07/18/2022    TRIGLYCFAST 234 (H) 07/18/2022    HDL 34 (L) 07/18/2022    HDL 37 (L) 11/30/2020    HDL 33 (L) 05/18/2020    LDLCALC 54 07/18/2022    LDLCALC 54 11/30/2020    LDLCALC 86 05/18/2020    CHOL 126 11/30/2020    CHOL 171 05/18/2020    CHOL 121 11/21/2019    TRIG 176 (H) 11/30/2020    TRIG 261 (H) 05/18/2020    TRIG 208 (H) 11/21/2019     No results found for: TESTM  Lab Results   Component Value Date    TSH 1.850 01/18/2022    TSH 1.730 11/30/2020    TSH 2.030 05/18/2020    TSHREFLEX 0.940 07/18/2022    TSHREFLEX 1.340 07/20/2021    T4FREE 1.35 07/18/2022    T4FREE 1.39 01/18/2022    T4FREE 1.40 07/20/2021     No results found for: TPOABS    Review of Systems   Eyes: Negative. Cardiovascular: Negative. Endocrine: Negative. Objective:   Physical Exam  Vitals reviewed. Constitutional:       Appearance: Normal appearance. She is obese. HENT:      Head: Normocephalic and atraumatic. Right Ear: External ear normal.      Left Ear: External ear normal.      Nose: Nose normal.   Eyes:      General: No scleral icterus. Right eye: No discharge. Left eye: No discharge. Extraocular Movements: Extraocular movements intact. Conjunctiva/sclera: Conjunctivae normal.   Cardiovascular:      Rate and Rhythm: Normal rate. Pulmonary:      Effort: Pulmonary effort is normal.   Musculoskeletal:         General: Normal range of motion. Cervical back: Normal range of motion and neck supple. Neurological:      General: No focal deficit present. Mental Status: She is alert.    Psychiatric:         Mood and Affect: Mood normal.

## 2022-07-31 ASSESSMENT — ENCOUNTER SYMPTOMS: EYES NEGATIVE: 1

## 2023-01-23 DIAGNOSIS — E03.9 ACQUIRED HYPOTHYROIDISM: ICD-10-CM

## 2023-01-23 DIAGNOSIS — E11.65 UNCONTROLLED TYPE 2 DIABETES MELLITUS WITH HYPERGLYCEMIA (HCC): ICD-10-CM

## 2023-01-23 LAB
ANION GAP SERPL CALCULATED.3IONS-SCNC: 10 MEQ/L (ref 9–15)
BUN BLDV-MCNC: 15 MG/DL (ref 8–23)
CALCIUM SERPL-MCNC: 9.2 MG/DL (ref 8.5–9.9)
CHLORIDE BLD-SCNC: 104 MEQ/L (ref 95–107)
CO2: 27 MEQ/L (ref 20–31)
CREAT SERPL-MCNC: 0.65 MG/DL (ref 0.5–0.9)
GFR SERPL CREATININE-BSD FRML MDRD: >60 ML/MIN/{1.73_M2}
GLUCOSE BLD-MCNC: 110 MG/DL (ref 70–99)
HBA1C MFR BLD: 6.1 % (ref 4.8–5.9)
POTASSIUM SERPL-SCNC: 4.3 MEQ/L (ref 3.4–4.9)
SODIUM BLD-SCNC: 141 MEQ/L (ref 135–144)
T4 FREE: 1.28 NG/DL (ref 0.84–1.68)
TSH SERPL DL<=0.05 MIU/L-ACNC: 1.32 UIU/ML (ref 0.44–3.86)

## 2023-01-25 ENCOUNTER — OFFICE VISIT (OUTPATIENT)
Dept: ENDOCRINOLOGY | Age: 68
End: 2023-01-25

## 2023-01-25 VITALS
HEIGHT: 62 IN | DIASTOLIC BLOOD PRESSURE: 76 MMHG | WEIGHT: 187 LBS | BODY MASS INDEX: 34.41 KG/M2 | OXYGEN SATURATION: 97 % | SYSTOLIC BLOOD PRESSURE: 136 MMHG | HEART RATE: 81 BPM

## 2023-01-25 DIAGNOSIS — E03.9 ACQUIRED HYPOTHYROIDISM: ICD-10-CM

## 2023-01-25 DIAGNOSIS — E11.65 UNCONTROLLED TYPE 2 DIABETES MELLITUS WITH HYPERGLYCEMIA (HCC): Primary | ICD-10-CM

## 2023-01-25 LAB
CHP ED QC CHECK: NORMAL
GLUCOSE BLD-MCNC: 170 MG/DL

## 2023-01-25 RX ORDER — LEVOTHYROXINE SODIUM 0.1 MG/1
TABLET ORAL
Qty: 90 TABLET | Refills: 3 | Status: SHIPPED | OUTPATIENT
Start: 2023-01-25

## 2023-01-25 NOTE — PROGRESS NOTES
1/25/2023    Assessment:       Diagnosis Orders   1. Uncontrolled type 2 diabetes mellitus with hyperglycemia (HCC)  POCT Glucose      2. Acquired hypothyroidism              PLAN:     Orders Placed This Encounter   Procedures    Hemoglobin A1C     Standing Status:   Future     Standing Expiration Date:   8/66/4991    Basic Metabolic Panel     Standing Status:   Future     Standing Expiration Date:   1/25/2024    T4, Free     Standing Status:   Future     Standing Expiration Date:   1/25/2024    TSH with Reflex     Standing Status:   Future     Standing Expiration Date:   1/25/2024    POCT Glucose     Orders Placed This Encounter   Medications    metFORMIN (GLUCOPHAGE) 500 MG tablet     Sig: bid     Dispense:  180 tablet     Refill:  3    levothyroxine (SYNTHROID) 100 MCG tablet     Sig: TAKE 1 TABLET DAILY     Dispense:  90 tablet     Refill:  3         Orders Placed This Encounter   Procedures    POCT Glucose     No orders of the defined types were placed in this encounter. Return for patient not sure about PCP appt yet. Subjective:     Chief Complaint   Patient presents with    Diabetes    Hypothyroidism     Vitals:    01/25/23 1040   BP: 136/76   Pulse: 81   SpO2: 97%   Weight: 187 lb (84.8 kg)   Height: 5' 2\" (1.575 m)     Wt Readings from Last 3 Encounters:   01/25/23 187 lb (84.8 kg)   07/20/22 198 lb (89.8 kg)   01/20/22 198 lb (89.8 kg)     BP Readings from Last 3 Encounters:   01/25/23 136/76   07/20/22 130/84   01/20/22 (!) 150/84     Follow-up on type 2 diabetes obesity hypothyroidism lab review hemoglobin A1c done recently was 6.1 currently on metformin hypothyroidism replacement with levothyroxine 100 mcg daily thyroid function tests are stable    Diabetes  She presents for her follow-up diabetic visit. She has type 2 diabetes mellitus. Associated symptoms include fatigue. Pertinent negatives for diabetes include no polyuria and no weight loss. Symptoms are stable.  Her overall blood glucose range is 110-130 mg/dl. Thyroid Problem  Presents for follow-up visit. Symptoms include fatigue. Patient reports no weight loss. The symptoms have been stable. Past Medical History:   Diagnosis Date    Bre[facility] ([facility]     Cancer ([facility]     Depression     Hyperlipidemia     Hypertension     Thyroid disease     Type II diabetes mellitus, uncontrolled      Past Surgical History:   Procedure Laterality Date    BREAST LUMPECTOMY Left [address] Right 2/19/2021    RIGHT TOTAL KNEE ARTHROPLASTY WITH TIBIAL STEMS SUPINE; CONSTANTIN PERSONA TIBIAL STEM CONSTANTIN-NARCISO performed by Grzegorz Bustamante MD at [address] [place] [address] 83 Left 9/3/2021    LEFT TOTAL KNEE ARTHROPLASTY. SUPINE;  23 HR BEDDED OBS; CONSTANTIN, [id number] PERSONA.  NARCISO performed by Grzegorz Bustamante MD at [address] [place] [address] History     Socioeconomic History    Marital status:      Spouse name: Not on file    Number of children: Not on file    Years of education: Not on file    Highest education level: Not on file   Occupational History    Not on file   Tobacco Use    Smoking status: Every Day     Packs/day: 1.00     Years: 40.00     Pack years: 40.00     Types: Cigarettes    Smokeless tobacco: Never    Tobacco comments:     trying to cut back   Substance and Sexual Activity    Alcohol use: Never     Alcohol/week: 0.0 standard drinks    Drug use: Not Currently     Types: Marijuana Jenet Kishan)    Sexual activity: Not on file   Other Topics Concern    Not on file   Social History Narrative    Not on file     Social Determinants of Health     Financial Resource Strain: Not on file   Food Insecurity: Not on file   Transportation Needs: Not on file   Physical Activity: Not on file   Stress: Not on file   Social Connections: Not on file   Intimate Partner Violence: Not on file   Housing Stability: Not on file     Family History   Problem Relation Age of Onset    Heart Disease Mother Cancer Father      No Known Allergies    Current Outpatient Medications:     levothyroxine (SYNTHROID) 100 MCG tablet, TAKE 1 TABLET DAILY, Disp: 90 tablet, Rfl: 3    metFORMIN (GLUCOPHAGE) 500 MG tablet, bid, Disp: 180 tablet, Rfl: 3    atorvastatin (LIPITOR) 10 MG tablet, TAKE 1 TABLET DAILY, Disp: 90 tablet, Rfl: 3    sertraline (ZOLOFT) 100 MG tablet, TAKE 1 TABLET DAILY, Disp: 90 tablet, Rfl: 3    amLODIPine (NORVASC) 10 MG tablet, TAKE 1 TABLET DAILY, Disp: 90 tablet, Rfl: 3    blood glucose monitor strips, Test BG once daily, DX: E11.9, NIDDM, Disp: 50 strip, Rfl: 6    Lancets MISC, Test BG once daily, DX: E11.65, NIDDM, Disp: 100 each, Rfl: 3    Syringe/Needle, Disp, (SYRINGE 3CC/25GX1\") 25G X 1\" 3 ML MISC, Use for monthly B-12 injection, Disp: 10 each, Rfl: 3    Blood Glucose Monitoring Suppl MIGUEL ANGEL, Test BG once daily, DX: E11.65, NIDDM, Disp: 1 Device, Rfl: 0    Alcohol Swabs PADS, Bid, Disp: 100 each, Rfl: 06    aspirin 81 MG EC tablet, Take 1 tablet by mouth 2 times daily for 14 days, Disp: 30 tablet, Rfl: 0    ibuprofen (ADVIL;MOTRIN) 800 MG tablet, Take 1 tablet by mouth every 6 hours as needed for Pain, Disp: 360 tablet, Rfl: 0  Lab Results   Component Value Date     01/23/2023    K 4.3 01/23/2023     01/23/2023    CO2 27 01/23/2023    BUN 15 01/23/2023    CREATININE 0.65 01/23/2023    GLUCOSE 170 01/25/2023    CALCIUM 9.2 01/23/2023    PROT 7.1 05/15/2019    LABALBU 4.2 05/15/2019    BILITOT 0.4 05/15/2019    ALKPHOS 54 05/15/2019    AST 10 05/15/2019    ALT 13 05/15/2019    LABGLOM >60.0 01/23/2023    GFRAA >60.0 07/18/2022     Lab Results   Component Value Date    WBC 10.5 09/04/2021    HGB 10.7 (L) 09/04/2021    HCT 31.4 (L) 09/04/2021    MCV 85.9 09/04/2021     09/04/2021     Lab Results   Component Value Date    LABA1C 6.1 (H) 01/23/2023    LABA1C 6.2 (H) 07/18/2022    LABA1C 6.2 (H) 01/18/2022     Lab Results   Component Value Date    CHOLFAST 135 07/18/2022    TRIGLYCFAST 234 (H) 07/18/2022    HDL 34 (L) 07/18/2022    HDL 37 (L) 11/30/2020    HDL 33 (L) 05/18/2020    LDLCALC 54 07/18/2022    LDLCALC 54 11/30/2020    LDLCALC 86 05/18/2020    CHOL 126 11/30/2020    CHOL 171 05/18/2020    CHOL 121 11/21/2019    TRIG 176 (H) 11/30/2020    TRIG 261 (H) 05/18/2020    TRIG 208 (H) 11/21/2019     No results found for: TESTM  Lab Results   Component Value Date    TSH 1.320 01/23/2023    TSH 1.850 01/18/2022    TSH 1.730 11/30/2020    TSHREFLEX 0.940 07/18/2022    TSHREFLEX 1.340 07/20/2021    T4FREE 1.28 01/23/2023    T4FREE 1.35 07/18/2022    T4FREE 1.39 01/18/2022     No results found for: TPOABS    Review of Systems   Constitutional:  Positive for fatigue. Negative for weight loss. Cardiovascular: Negative. Endocrine: Negative for polyuria. Psychiatric/Behavioral:  Positive for dysphoric mood. All other systems reviewed and are negative. Objective:   Physical Exam  Vitals reviewed. Constitutional:       General: She is not in acute distress. Appearance: Normal appearance. She is obese. HENT:      Head: Normocephalic and atraumatic. Right Ear: External ear normal.      Left Ear: External ear normal.      Nose: Nose normal.   Eyes:      General: No scleral icterus. Right eye: No discharge. Left eye: No discharge. Extraocular Movements: Extraocular movements intact. Conjunctiva/sclera: Conjunctivae normal.   Cardiovascular:      Rate and Rhythm: Normal rate. Pulmonary:      Effort: Pulmonary effort is normal.   Musculoskeletal:         General: Normal range of motion. Cervical back: Normal range of motion and neck supple. Neurological:      General: No focal deficit present. Mental Status: She is alert and oriented to person, place, and time.    Psychiatric:         Mood and Affect: Mood normal.         Behavior: Behavior normal.

## 2023-02-22 ENCOUNTER — OFFICE VISIT (OUTPATIENT)
Dept: FAMILY MEDICINE CLINIC | Age: 68
End: 2023-02-22

## 2023-02-22 VITALS
SYSTOLIC BLOOD PRESSURE: 136 MMHG | HEIGHT: 62 IN | DIASTOLIC BLOOD PRESSURE: 80 MMHG | OXYGEN SATURATION: 96 % | HEART RATE: 100 BPM | BODY MASS INDEX: 34.37 KG/M2 | TEMPERATURE: 97.1 F | WEIGHT: 186.8 LBS

## 2023-02-22 DIAGNOSIS — B02.9 HERPES ZOSTER WITHOUT COMPLICATION: Primary | ICD-10-CM

## 2023-02-22 RX ORDER — ACYCLOVIR 50 MG/G
OINTMENT TOPICAL
Qty: 30 G | Refills: 1 | Status: SHIPPED | OUTPATIENT
Start: 2023-02-22 | End: 2023-03-01

## 2023-02-22 RX ORDER — GABAPENTIN 600 MG/1
600 TABLET ORAL DAILY
Qty: 30 TABLET | Refills: 0 | Status: SHIPPED | OUTPATIENT
Start: 2023-02-22 | End: 2023-03-24

## 2023-02-22 RX ORDER — VALACYCLOVIR HYDROCHLORIDE 1 G/1
1000 TABLET, FILM COATED ORAL 3 TIMES DAILY
Qty: 21 TABLET | Refills: 0 | Status: SHIPPED | OUTPATIENT
Start: 2023-02-22 | End: 2023-03-01

## 2023-02-22 SDOH — ECONOMIC STABILITY: FOOD INSECURITY: WITHIN THE PAST 12 MONTHS, YOU WORRIED THAT YOUR FOOD WOULD RUN OUT BEFORE YOU GOT MONEY TO BUY MORE.: NEVER TRUE

## 2023-02-22 SDOH — ECONOMIC STABILITY: HOUSING INSECURITY
IN THE LAST 12 MONTHS, WAS THERE A TIME WHEN YOU DID NOT HAVE A STEADY PLACE TO SLEEP OR SLEPT IN A SHELTER (INCLUDING NOW)?: NO

## 2023-02-22 SDOH — ECONOMIC STABILITY: FOOD INSECURITY: WITHIN THE PAST 12 MONTHS, THE FOOD YOU BOUGHT JUST DIDN'T LAST AND YOU DIDN'T HAVE MONEY TO GET MORE.: NEVER TRUE

## 2023-02-22 SDOH — ECONOMIC STABILITY: INCOME INSECURITY: HOW HARD IS IT FOR YOU TO PAY FOR THE VERY BASICS LIKE FOOD, HOUSING, MEDICAL CARE, AND HEATING?: NOT HARD AT ALL

## 2023-02-22 ASSESSMENT — PATIENT HEALTH QUESTIONNAIRE - PHQ9
1. LITTLE INTEREST OR PLEASURE IN DOING THINGS: 0
SUM OF ALL RESPONSES TO PHQ QUESTIONS 1-9: 0
2. FEELING DOWN, DEPRESSED OR HOPELESS: 0
SUM OF ALL RESPONSES TO PHQ9 QUESTIONS 1 & 2: 0
SUM OF ALL RESPONSES TO PHQ QUESTIONS 1-9: 0

## 2023-02-22 NOTE — PROGRESS NOTES
Subjective:      Patient ID: Caryle Hurst is a 75 y.o. female who presents today for:  Chief Complaint   Patient presents with    Rash     Started with a rash under right breast on Monday & has spread around onto back. Rash is blister like, itches & burns. Has used neosporin & rubbing alcohol on the area with no relief. HPI SUBJECTIVE:   The patient has a 3 day history of a painful rash under right breast extending around to back. PMH: generally healthy. Has not had herpes zoster in the past.    OBJECTIVE:  Vital signs are normal, she appears well. Typical zoster lesions noted; vesicles on erythematous bases in clusters on the right side in a dermatomal pattern. ASSESSMENT:  Herpes Zoster (shingles)    PLAN:  The nature of herpes zoster is explained carefully. Lesions should be compressed/soaked with saline, topical antibiotic ointment to any infected lesions; Aloe Vera cream may help minor local pain. Intervention with antiviral agents is extremely helpful early in the course of the disease, less helpful after 2-3 days of symptoms. Postherpetic neuralgia is explained; this may occur especially in the elderly despite every attempt at prevention. Prescription for valacyclovir (Valtrex), which may shorten the course of acute symptoms and reduce the incidence of later neuralgia. The patient understands these issues, and will call as needed for further care.        Past Medical History:   Diagnosis Date    Breast cancer ([facility] [address]     Cancer ([facility] [address]     Depression     Hyperlipidemia     Hypertension     Thyroid disease     Type II diabetes mellitus, uncontrolled      Past Surgical History:   Procedure Laterality Date    BREAST LUMPECTOMY Left [address] Right 2/19/2021    RIGHT TOTAL KNEE ARTHROPLASTY WITH TIBIAL STEMS SUPINE; CONSTANTIN PERSONA TIBIAL STEM CONSTANTIN-NARCISO performed by Ashley New MD at [address] [place] [address] 83 Left 9/3/2021    LEFT TOTAL KNEE ARTHROPLASTY. SUPINE;  23 HR BEDDED OBS; CONSTANTIN, [id number] PERSONSALLIE STUART performed by Sonia Cruz MD at [address] [place] [address] History    Marital status:      Spouse name: Not on file    Number of children: Not on file    Years of education: Not on file    Highest education level: Not on file   Occupational History    Not on file   Tobacco Use    Smoking status: Every Day     Packs/day: 1.00     Years: 40.00     Pack years: 40.00     Types: Cigarettes    Smokeless tobacco: Never    Tobacco comments:     trying to cut back   Substance and Sexual Activity    Alcohol use: Never     Alcohol/week: 0.0 standard drinks    Drug use: Not Currently     Types: Marijuana Shirley Hotter)    Sexual activity: Not on file   Other Topics Concern    Not on file   Social History Narrative    Not on file     Social Determinants of Health     Financial Resource Strain: Low Risk     Difficulty of Paying Living Expenses: Not hard at all   Food Insecurity: No Food Insecurity    Worried About Running Out of Food in the Last Year: Never true    [address] in the Last Year: Never true   Transportation Needs: Unknown    Lack of Transportation (Medical): Not on file    Lack of Transportation (Non-Medical): No   Physical Activity: Not on file   Stress: Not on file   Social Connections: Not on file   Intimate Partner Violence: Not on file   Housing Stability: Unknown    Unable to Pay for Housing in the Last Year: Not on file    Number of Places Lived in the Last Year: Not on file    Unstable Housing in the Last Year: No     Family History   Problem Relation Age of Onset    Heart Disease Mother     Cancer Father      No Known Allergies  Current Outpatient Medications   Medication Sig Dispense Refill    valACYclovir (VALTREX) 1 g tablet Take 1 tablet by mouth 3 times daily for 7 days 21 tablet 0    gabapentin (NEURONTIN) 600 MG tablet Take 1 tablet by mouth daily for 30 days.  30 tablet 0    acyclovir (ZOVIRAX) 5 % ointment Apply topically 5 times daily. 30 g 1    metFORMIN (GLUCOPHAGE) 500 MG tablet bid 180 tablet 3    levothyroxine (SYNTHROID) 100 MCG tablet TAKE 1 TABLET DAILY 90 tablet 3    atorvastatin (LIPITOR) 10 MG tablet TAKE 1 TABLET DAILY 90 tablet 3    sertraline (ZOLOFT) 100 MG tablet TAKE 1 TABLET DAILY 90 tablet 3    amLODIPine (NORVASC) 10 MG tablet TAKE 1 TABLET DAILY 90 tablet 3    ibuprofen (ADVIL;MOTRIN) 800 MG tablet Take 1 tablet by mouth every 6 hours as needed for Pain 360 tablet 0    blood glucose monitor strips Test BG once daily, DX: E11.9, NIDDM 50 strip 6    Lancets MISC Test BG once daily, DX: E11.65, NIDDM 100 each 3    Blood Glucose Monitoring Suppl MIGUEL ANGEL Test BG once daily, DX: E11.65, NIDDM 1 Device 0    Alcohol Swabs PADS Bid 100 each 06     No current facility-administered medications for this visit. Objective:   /80   Pulse 100   Temp 97.1 °F (36.2 °C)   Ht 5' 2\" (1.575 m)   Wt 186 lb 12.8 oz (84.7 kg)   SpO2 96%   BMI 34.17 kg/m²         Assessment:       Diagnosis Orders   1. Herpes zoster without complication          No results found for this visit on 02/22/23. Plan:     Assessment & Plan   Nargis Guzman was seen today for rash. Diagnoses and all orders for this visit:    Herpes zoster without complication    Other orders  -     valACYclovir (VALTREX) 1 g tablet; Take 1 tablet by mouth 3 times daily for 7 days  -     gabapentin (NEURONTIN) 600 MG tablet; Take 1 tablet by mouth daily for 30 days. -     acyclovir (ZOVIRAX) 5 % ointment; Apply topically 5 times daily. No orders of the defined types were placed in this encounter. Orders Placed This Encounter   Medications    valACYclovir (VALTREX) 1 g tablet     Sig: Take 1 tablet by mouth 3 times daily for 7 days     Dispense:  21 tablet     Refill:  0    gabapentin (NEURONTIN) 600 MG tablet     Sig: Take 1 tablet by mouth daily for 30 days.      Dispense:  30 tablet     Refill:  0 acyclovir (ZOVIRAX) 5 % ointment     Sig: Apply topically 5 times daily. Dispense:  30 g     Refill:  1     Medications Discontinued During This Encounter   Medication Reason    aspirin 81 MG EC tablet Therapy completed    Syringe/Needle, Disp, (SYRINGE 3CC/25GX1\") 25G X 1\" 3 ML MISC Therapy completed     No follow-ups on file. Reviewed with the patient/family: current clinical status & medications. Side effects of the medication prescribed today, as well as treatment plan/rationale and result expectations have been discussed with the patient/family who expresses understanding. Patient will be discharged home in stable condition. Follow up with PCP to evaluate treatment results or return if symptoms worsen or fail to improve. Discussed signs and symptoms which require immediate follow-up in ED/call to 911. Understanding verbalized. I have reviewed the patient's medical history in detail and updated the computerized patient record.     ALY Allison - CNP

## 2023-03-06 ENCOUNTER — OFFICE VISIT (OUTPATIENT)
Dept: FAMILY MEDICINE CLINIC | Age: 68
End: 2023-03-06
Payer: MEDICARE

## 2023-03-06 VITALS
BODY MASS INDEX: 34.23 KG/M2 | HEART RATE: 113 BPM | TEMPERATURE: 97.5 F | SYSTOLIC BLOOD PRESSURE: 132 MMHG | HEIGHT: 62 IN | WEIGHT: 186 LBS | OXYGEN SATURATION: 96 % | DIASTOLIC BLOOD PRESSURE: 76 MMHG

## 2023-03-06 DIAGNOSIS — I10 PRIMARY HYPERTENSION: Primary | ICD-10-CM

## 2023-03-06 DIAGNOSIS — Z12.31 ENCOUNTER FOR SCREENING MAMMOGRAM FOR MALIGNANT NEOPLASM OF BREAST: ICD-10-CM

## 2023-03-06 DIAGNOSIS — F33.0 MAJOR DEPRESSIVE DISORDER, RECURRENT EPISODE, MILD (HCC): ICD-10-CM

## 2023-03-06 DIAGNOSIS — B02.9 HERPES ZOSTER WITHOUT COMPLICATION: ICD-10-CM

## 2023-03-06 DIAGNOSIS — E78.2 HYPERLIPEMIA, MIXED: ICD-10-CM

## 2023-03-06 DIAGNOSIS — E11.65 TYPE 2 DIABETES MELLITUS WITH HYPERGLYCEMIA, WITHOUT LONG-TERM CURRENT USE OF INSULIN (HCC): ICD-10-CM

## 2023-03-06 DIAGNOSIS — E03.9 HYPOTHYROIDISM, UNSPECIFIED TYPE: ICD-10-CM

## 2023-03-06 PROCEDURE — 1123F ACP DISCUSS/DSCN MKR DOCD: CPT | Performed by: NURSE PRACTITIONER

## 2023-03-06 PROCEDURE — 99204 OFFICE O/P NEW MOD 45 MIN: CPT | Performed by: NURSE PRACTITIONER

## 2023-03-06 PROCEDURE — G8417 CALC BMI ABV UP PARAM F/U: HCPCS | Performed by: NURSE PRACTITIONER

## 2023-03-06 PROCEDURE — 1090F PRES/ABSN URINE INCON ASSESS: CPT | Performed by: NURSE PRACTITIONER

## 2023-03-06 PROCEDURE — G8400 PT W/DXA NO RESULTS DOC: HCPCS | Performed by: NURSE PRACTITIONER

## 2023-03-06 PROCEDURE — G8484 FLU IMMUNIZE NO ADMIN: HCPCS | Performed by: NURSE PRACTITIONER

## 2023-03-06 PROCEDURE — 2022F DILAT RTA XM EVC RTNOPTHY: CPT | Performed by: NURSE PRACTITIONER

## 2023-03-06 PROCEDURE — 3078F DIAST BP <80 MM HG: CPT | Performed by: NURSE PRACTITIONER

## 2023-03-06 PROCEDURE — 3017F COLORECTAL CA SCREEN DOC REV: CPT | Performed by: NURSE PRACTITIONER

## 2023-03-06 PROCEDURE — 4004F PT TOBACCO SCREEN RCVD TLK: CPT | Performed by: NURSE PRACTITIONER

## 2023-03-06 PROCEDURE — 3044F HG A1C LEVEL LT 7.0%: CPT | Performed by: NURSE PRACTITIONER

## 2023-03-06 PROCEDURE — G8427 DOCREV CUR MEDS BY ELIG CLIN: HCPCS | Performed by: NURSE PRACTITIONER

## 2023-03-06 PROCEDURE — 3075F SYST BP GE 130 - 139MM HG: CPT | Performed by: NURSE PRACTITIONER

## 2023-03-06 RX ORDER — FLUOXETINE HYDROCHLORIDE 20 MG/1
20 CAPSULE ORAL DAILY
Qty: 30 CAPSULE | Refills: 1 | Status: SHIPPED | OUTPATIENT
Start: 2023-03-06

## 2023-03-06 ASSESSMENT — PATIENT HEALTH QUESTIONNAIRE - PHQ9
1. LITTLE INTEREST OR PLEASURE IN DOING THINGS: 0
SUM OF ALL RESPONSES TO PHQ QUESTIONS 1-9: 1
2. FEELING DOWN, DEPRESSED OR HOPELESS: 1
SUM OF ALL RESPONSES TO PHQ QUESTIONS 1-9: 1
SUM OF ALL RESPONSES TO PHQ9 QUESTIONS 1 & 2: 1

## 2023-03-06 ASSESSMENT — ENCOUNTER SYMPTOMS
RESPIRATORY NEGATIVE: 1
COUGH: 0
WHEEZING: 0
SHORTNESS OF BREATH: 0
BLOOD IN STOOL: 0

## 2023-03-06 ASSESSMENT — ANXIETY QUESTIONNAIRES
2. NOT BEING ABLE TO STOP OR CONTROL WORRYING: 0
4. TROUBLE RELAXING: 0
7. FEELING AFRAID AS IF SOMETHING AWFUL MIGHT HAPPEN: 0
IF YOU CHECKED OFF ANY PROBLEMS ON THIS QUESTIONNAIRE, HOW DIFFICULT HAVE THESE PROBLEMS MADE IT FOR YOU TO DO YOUR WORK, TAKE CARE OF THINGS AT HOME, OR GET ALONG WITH OTHER PEOPLE: NOT DIFFICULT AT ALL
3. WORRYING TOO MUCH ABOUT DIFFERENT THINGS: 1
1. FEELING NERVOUS, ANXIOUS, OR ON EDGE: 1
6. BECOMING EASILY ANNOYED OR IRRITABLE: 1

## 2023-03-06 NOTE — PROGRESS NOTES
[facility]  [address] [facility] [address]  Dept: [phone]  Dept Fax: [phone]  Loc: [phone]     DILSHAD Mckeon (: 1955) is a 75 y.o. female, New patient, here for evaluation of the following chief complaint(s):  New Patient, Diabetes (Last A1C 23=6.1%), Hypertension, Hypothyroidism, and Herpes Zoster      PCP:  ALY Weeks - [facility]      HPI    Establish Care:     History:   Any previous diagnosis:HTN, HLD, hypothyroidism, anxiety/depression, DM  History of seeing any specialist(s): Endocrine  Last provider: Hasn't seen a PCP  Any recent labs: no    Health Maintenence UTD:  Colonoscopy:Denies repeat  Mammogram: 2023 it will be due  Pelvic/pap: denies having them recently    Acute:   Current issues/complaints:     Patient is here for follow up on hypertension. How often are you checking your blood pressure? No  What are your average readings? N/A  Are you compliant with your diet? Yes  Do you exercise? No  Are you compliant with your medications? Yes  Are you having difficulty affording your medications? No  Do you have side effects from the medication? No  Do you have any of the following symptoms? Chest Pain? No  Palpitations? No  MCMANUS/SOB? No  Headache? No  Peripheral Edema? No  Light Headedness? No      Patient is here for follow up on hyperlipidemia:    Are you compliant with your medications? yes  Are you having difficulty affording your medications? no  Do you have side effects from the medication? no  Are you compliant with your diet? yes  Do you exercise?  Yes    Last labs:  Lab Results   Component Value Date    CHOL 126 2020    CHOL 171 2020    CHOL 121 2019     Lab Results   Component Value Date    TRIG 176 (H) 2020    TRIG 261 (H) 2020    TRIG 208 (H) 2019     Lab Results   Component Value Date HDL 34 (L) 07/18/2022    HDL 37 (L) 11/30/2020    HDL 33 (L) 05/18/2020     Lab Results   Component Value Date    LDLCALC 54 07/18/2022    LDLCALC 54 11/30/2020    LDLCALC 86 05/18/2020     Lab Results   Component Value Date    VLDL 39 04/26/2018    VLDL 43 04/25/2017    VLDL 63 10/21/2016     No results found for: CHOLDANNY   The 10-year ASCVD risk score (Anjel GUTIERREZ, et al., 2019) is: 31.5%    Values used to calculate the score:      Age: 75 years      Sex: Female      Is Non- : No      Diabetic: Yes      Tobacco smoker: Yes      Systolic Blood Pressure: 911 mmHg      Is BP treated: Yes      HDL Cholesterol: 34 mg/dL      Total Cholesterol: 135 mg/dL     Last labs:  Lab Results   Component Value Date/Time     01/23/2023 09:29 AM    K 4.3 01/23/2023 09:29 AM    K 4.5 09/04/2021 07:36 AM     01/23/2023 09:29 AM    CO2 27 01/23/2023 09:29 AM    BUN 15 01/23/2023 09:29 AM    CREATININE 0.65 01/23/2023 09:29 AM    GLUCOSE 170 01/25/2023 10:45 AM    CALCIUM 9.2 01/23/2023 09:29 AM       Lab Results   Component Value Date    CHOL 126 11/30/2020    CHOL 171 05/18/2020    CHOL 121 11/21/2019     Lab Results   Component Value Date    TRIG 176 (H) 11/30/2020    TRIG 261 (H) 05/18/2020    TRIG 208 (H) 11/21/2019     Lab Results   Component Value Date    HDL 34 (L) 07/18/2022    HDL 37 (L) 11/30/2020    HDL 33 (L) 05/18/2020     Lab Results   Component Value Date    LDLCALC 54 07/18/2022    LDLCALC 54 11/30/2020    LDLCALC 86 05/18/2020     Lab Results   Component Value Date    VLDL 39 04/26/2018    VLDL 43 04/25/2017    VLDL 63 10/21/2016     No results found for: GUANAKITOATIO      Follow up for Mental Health:    Time of diagnosis: 18+  Psychology or FM managed: Family Medicine  Counseling: No  Depression: Yes  Anxiety: Yes  Sleeping pattern: normal  Mood swings: Yes  Generally feeling happy: No  Medication adherence: yes takes medication as prescribed  Side effects: None  Suicidal Thoughts: None  Most recent GODFREY score:   GODFREY-7 SCREENING 3/6/2023   Feeling nervous, anxious, or on edge Several days   Not being able to stop or control worrying Not at all   Worrying too much about different things Several days   Trouble relaxing Not at all   Becoming easily annoyed or irritable Several days   Feeling afraid as if something awful might happen Not at all   How difficult have these problems made it for you to do your work, take care of things at home, or get along with other people? Not difficult at all      Most recent PHQ9 score:   PHQ-9  3/6/2023   Little interest or pleasure in doing things 0   Feeling down, depressed, or hopeless 1   PHQ-2 Score 1   PHQ-9 Total Score 1         Review of Systems   Constitutional: Negative. Negative for fatigue and fever. Respiratory: Negative. Negative for cough, shortness of breath and wheezing. Cardiovascular: Negative. Negative for chest pain, palpitations and leg swelling. Gastrointestinal:  Negative for blood in stool. Psychiatric/Behavioral: Negative. Negative for behavioral problems. The patient is not nervous/anxious. Past Medical History:   Diagnosis Date    Breast cancer ([facility] [address]     Cancer ([facility] [address]     Depression     Hyperlipidemia     Hypertension     Thyroid disease     Type II diabetes mellitus, uncontrolled      Past Surgical History:   Procedure Laterality Date    BREAST LUMPECTOMY Left [address] Right 2/19/2021    RIGHT TOTAL KNEE ARTHROPLASTY WITH TIBIAL STEMS SUPINE; CONSTANTIN PERSONA TIBIAL STEM EUN performed by Charles Diana MD at [address] [place] [address] Left 9/3/2021    LEFT TOTAL KNEE ARTHROPLASTY. SUPINE;  23 HR BEDDED OBS; CONSTANTIN, [id number] VIOLETTA.  NARCISO performed by Charles Diana MD at [address] [place] [address] History    Marital status:      Spouse name: Not on file    Number of children: Not on file    Years of education: Not on file    Highest education level: Not on file   Occupational History    Not on file   Tobacco Use    Smoking status: Every Day     Packs/day: 1.00     Years: 40.00     Pack years: 40.00     Types: Cigarettes    Smokeless tobacco: Never    Tobacco comments:     trying to cut back   Substance and Sexual Activity    Alcohol use: Never     Alcohol/week: 0.0 standard drinks    Drug use: Not Currently     Types: Marijuana Simmie Adarsh)    Sexual activity: Not on file   Other Topics Concern    Not on file   Social History Narrative    Not on file     Social Determinants of Health     Financial Resource Strain: Low Risk     Difficulty of Paying Living Expenses: Not hard at all   Food Insecurity: No Food Insecurity    Worried About Running Out of Food in the Last Year: Never true    [address] in the Last Year: Never true   Transportation Needs: Unknown    Lack of Transportation (Medical): Not on file    Lack of Transportation (Non-Medical): No   Physical Activity: Not on file   Stress: Not on file   Social Connections: Not on file   Intimate Partner Violence: Not on file   Housing Stability: Unknown    Unable to Pay for Housing in the Last Year: Not on file    Number of Places Lived in the Last Year: Not on file    Unstable Housing in the Last Year: No     Family History   Problem Relation Age of Onset    Heart Disease Mother     Cancer Father       No Known Allergies  Prior to Admission medications    Medication Sig Start Date End Date Taking? Authorizing Provider   FLUoxetine (PROZAC) 20 MG capsule Take 1 capsule by mouth daily 3/6/23  Yes ALY Brennan CNP   gabapentin (NEURONTIN) 600 MG tablet Take 1 tablet by mouth daily for 30 days.  2/22/23 3/24/23  ALY Scott CNP   metFORMIN (GLUCOPHAGE) 500 MG tablet bid 1/25/23   Rosalia Montenegro MD   levothyroxine (SYNTHROID) 100 MCG tablet TAKE 1 TABLET DAILY 1/25/23   Rosalia Montenegro MD   atorvastatin (LIPITOR) 10 MG tablet TAKE 1 TABLET DAILY 7/18/22   Ashtyn Crandall MD   amLODIPine (NORVASC) 10 MG tablet TAKE 1 TABLET DAILY 3/1/22   Ashtyn Crandall MD   ibuprofen (ADVIL;MOTRIN) 800 MG tablet Take 1 tablet by mouth every 6 hours as needed for Pain 9/18/20 2/22/23  Izabela Romero MD   blood glucose monitor strips Test BG once daily, DX: E11.9, NIDDM 12/6/18   Ashtyn Crandall MD   Lancets MISC Test BG once daily, DX: E11.65, NIDDM 12/6/18   Ashtyn Crandall MD   Blood Glucose Monitoring Suppl MIGUEL ANGEL Test BG once daily, DX: E11.65, NIDDM 1/6/17   Ashtyn Crandall MD   Alcohol Swabs PADS Bid 8/1/16   Ashtyn Crandall MD                I have reviewed the patient's medical history in detail and updated the computerized patient record. OBJECTIVE    Vitals:    03/06/23 1229   BP: 132/76   Pulse: (!) 113   Temp: 97.5 °F (36.4 °C)   TempSrc: Temporal   SpO2: 96%   Weight: 186 lb (84.4 kg)   Height: 5' 2\" (1.575 m)       Physical Exam  Constitutional:       Appearance: Normal appearance. HENT:      Head: Normocephalic. Cardiovascular:      Rate and Rhythm: Normal rate and regular rhythm. Heart sounds: No murmur heard. Pulmonary:      Effort: Pulmonary effort is normal. No respiratory distress. Breath sounds: Normal breath sounds. No wheezing. Skin:     General: Skin is warm and dry. Neurological:      General: No focal deficit present. Mental Status: She is alert and oriented to person, place, and time. ASSESSMENT/ PLAN    1. Primary hypertension  Continue Norvasc. Labs stable. 2. Type 2 diabetes mellitus with hyperglycemia, without long-term current use of insulin (HCC)  Continue metformin daily. Sees Dr. Shanice Cook. 3. Hyperlipemia, mixed  Labs ordered, is to continue Lipitor.   - Lipid Panel; Future    4. Major depressive disorder, recurrent episode, mild (HCC)  Stop zoloft, start prozac, 6 week follow up. - FLUoxetine (PROZAC) 20 MG capsule; Take 1 capsule by mouth daily  Dispense: 30 capsule; Refill: 1    5.  Herpes zoster without complication  Healing well. Stable    6. Hypothyroidism, unspecified type  Continue synthroid as prescribed. Sees Dr. Jane Waldrop    7. Encounter for screening mammogram for malignant neoplasm of breast  Mammo ordered. - TRACE DIGITAL SCREEN W OR WO CAD BILATERAL; Future      Dexa declined. Immunizations declined. [facility] declined. On this date 3/6/2023 I have spent 45 minutes reviewing previous notes, test results and face to face with the patient discussing the diagnosis and importance of compliance with the treatment plan as well as documenting on the day of the visit. Return in about 2 months (around 5/6/2023).      Electronically signed by:  ALY Vang CNP   3/6/23

## 2023-03-09 DIAGNOSIS — E78.2 HYPERLIPEMIA, MIXED: ICD-10-CM

## 2023-03-09 LAB
CHOLESTEROL, TOTAL: 147 MG/DL (ref 0–199)
HDLC SERPL-MCNC: 32 MG/DL (ref 40–59)
LDL CHOLESTEROL CALCULATED: 69 MG/DL (ref 0–129)
TRIGL SERPL-MCNC: 228 MG/DL (ref 0–150)

## 2023-03-14 DIAGNOSIS — E78.1 HYPERTRIGLYCERIDEMIA: Primary | ICD-10-CM

## 2023-03-14 RX ORDER — FENOFIBRATE 48 MG/1
48 TABLET, COATED ORAL DAILY
Qty: 30 TABLET | Refills: 3 | Status: SHIPPED | OUTPATIENT
Start: 2023-03-14

## 2023-05-08 DIAGNOSIS — F33.0 MAJOR DEPRESSIVE DISORDER, RECURRENT EPISODE, MILD (HCC): ICD-10-CM

## 2023-05-08 RX ORDER — FLUOXETINE HYDROCHLORIDE 20 MG/1
20 CAPSULE ORAL DAILY
Qty: 30 CAPSULE | Refills: 1 | Status: SHIPPED | OUTPATIENT
Start: 2023-05-08

## 2023-05-08 NOTE — TELEPHONE ENCOUNTER
Patient requesting medication refill.  Please approve or deny this request.    Rx requested:  Requested Prescriptions     Pending Prescriptions Disp Refills    FLUoxetine (PROZAC) 20 MG capsule 30 capsule 1     Sig: Take 1 capsule by mouth daily         Last Office Visit:   3/6/2023      Next Visit Date:  Future Appointments   Date Time Provider Arnold Ibanez   5/15/2023  1:30 PM ALY Rosa CNP [facility]   5/15/2023  2:00 PM ALY Voss CNP [facility]   7/26/2023 11:00 AM Adonay Galvan MD [facility]

## 2023-05-15 ENCOUNTER — OFFICE VISIT (OUTPATIENT)
Dept: FAMILY MEDICINE CLINIC | Age: 68
End: 2023-05-15
Payer: MEDICARE

## 2023-05-15 VITALS
HEIGHT: 62 IN | BODY MASS INDEX: 33.31 KG/M2 | SYSTOLIC BLOOD PRESSURE: 130 MMHG | DIASTOLIC BLOOD PRESSURE: 68 MMHG | DIASTOLIC BLOOD PRESSURE: 68 MMHG | HEART RATE: 82 BPM | OXYGEN SATURATION: 98 % | OXYGEN SATURATION: 98 % | HEART RATE: 82 BPM | TEMPERATURE: 97.2 F | HEIGHT: 62 IN | TEMPERATURE: 97.2 F | BODY MASS INDEX: 33.31 KG/M2 | WEIGHT: 181 LBS | WEIGHT: 181 LBS | SYSTOLIC BLOOD PRESSURE: 130 MMHG

## 2023-05-15 DIAGNOSIS — Z72.89 OTHER PROBLEMS RELATED TO LIFESTYLE: ICD-10-CM

## 2023-05-15 DIAGNOSIS — Z78.0 ASYMPTOMATIC MENOPAUSAL STATE: ICD-10-CM

## 2023-05-15 DIAGNOSIS — E78.2 HYPERLIPEMIA, MIXED: ICD-10-CM

## 2023-05-15 DIAGNOSIS — Z00.00 INITIAL MEDICARE ANNUAL WELLNESS VISIT: Primary | ICD-10-CM

## 2023-05-15 DIAGNOSIS — E03.9 HYPOTHYROIDISM, UNSPECIFIED TYPE: ICD-10-CM

## 2023-05-15 DIAGNOSIS — Z87.891 PERSONAL HISTORY OF TOBACCO USE: ICD-10-CM

## 2023-05-15 DIAGNOSIS — E11.65 TYPE 2 DIABETES MELLITUS WITH HYPERGLYCEMIA, WITHOUT LONG-TERM CURRENT USE OF INSULIN (HCC): ICD-10-CM

## 2023-05-15 DIAGNOSIS — Z11.59 NEED FOR HEPATITIS C SCREENING TEST: ICD-10-CM

## 2023-05-15 DIAGNOSIS — F33.1 MODERATE EPISODE OF RECURRENT MAJOR DEPRESSIVE DISORDER (HCC): Primary | ICD-10-CM

## 2023-05-15 PROCEDURE — 3044F HG A1C LEVEL LT 7.0%: CPT | Performed by: NURSE PRACTITIONER

## 2023-05-15 PROCEDURE — G8400 PT W/DXA NO RESULTS DOC: HCPCS | Performed by: NURSE PRACTITIONER

## 2023-05-15 PROCEDURE — 4004F PT TOBACCO SCREEN RCVD TLK: CPT | Performed by: NURSE PRACTITIONER

## 2023-05-15 PROCEDURE — G0438 PPPS, INITIAL VISIT: HCPCS | Performed by: NURSE PRACTITIONER

## 2023-05-15 PROCEDURE — G8427 DOCREV CUR MEDS BY ELIG CLIN: HCPCS | Performed by: NURSE PRACTITIONER

## 2023-05-15 PROCEDURE — 99214 OFFICE O/P EST MOD 30 MIN: CPT | Performed by: NURSE PRACTITIONER

## 2023-05-15 PROCEDURE — 2022F DILAT RTA XM EVC RTNOPTHY: CPT | Performed by: NURSE PRACTITIONER

## 2023-05-15 PROCEDURE — 3078F DIAST BP <80 MM HG: CPT | Performed by: NURSE PRACTITIONER

## 2023-05-15 PROCEDURE — 3075F SYST BP GE 130 - 139MM HG: CPT | Performed by: NURSE PRACTITIONER

## 2023-05-15 PROCEDURE — 3017F COLORECTAL CA SCREEN DOC REV: CPT | Performed by: NURSE PRACTITIONER

## 2023-05-15 PROCEDURE — 1123F ACP DISCUSS/DSCN MKR DOCD: CPT | Performed by: NURSE PRACTITIONER

## 2023-05-15 PROCEDURE — 1090F PRES/ABSN URINE INCON ASSESS: CPT | Performed by: NURSE PRACTITIONER

## 2023-05-15 PROCEDURE — G8417 CALC BMI ABV UP PARAM F/U: HCPCS | Performed by: NURSE PRACTITIONER

## 2023-05-15 ASSESSMENT — ENCOUNTER SYMPTOMS
RESPIRATORY NEGATIVE: 1
BLOOD IN STOOL: 0
COUGH: 0
WHEEZING: 0
SHORTNESS OF BREATH: 0

## 2023-05-15 ASSESSMENT — PATIENT HEALTH QUESTIONNAIRE - PHQ9
9. THOUGHTS THAT YOU WOULD BE BETTER OFF DEAD, OR OF HURTING YOURSELF: 0
SUM OF ALL RESPONSES TO PHQ9 QUESTIONS 1 & 2: 0
7. TROUBLE CONCENTRATING ON THINGS, SUCH AS READING THE NEWSPAPER OR WATCHING TELEVISION: 0
6. FEELING BAD ABOUT YOURSELF - OR THAT YOU ARE A FAILURE OR HAVE LET YOURSELF OR YOUR FAMILY DOWN: 0
10. IF YOU CHECKED OFF ANY PROBLEMS, HOW DIFFICULT HAVE THESE PROBLEMS MADE IT FOR YOU TO DO YOUR WORK, TAKE CARE OF THINGS AT HOME, OR GET ALONG WITH OTHER PEOPLE: 0
SUM OF ALL RESPONSES TO PHQ QUESTIONS 1-9: 3
3. TROUBLE FALLING OR STAYING ASLEEP: 1
1. LITTLE INTEREST OR PLEASURE IN DOING THINGS: 0
SUM OF ALL RESPONSES TO PHQ QUESTIONS 1-9: 3
8. MOVING OR SPEAKING SO SLOWLY THAT OTHER PEOPLE COULD HAVE NOTICED. OR THE OPPOSITE, BEING SO FIGETY OR RESTLESS THAT YOU HAVE BEEN MOVING AROUND A LOT MORE THAN USUAL: 0
SUM OF ALL RESPONSES TO PHQ QUESTIONS 1-9: 3
SUM OF ALL RESPONSES TO PHQ QUESTIONS 1-9: 3
5. POOR APPETITE OR OVEREATING: 0
2. FEELING DOWN, DEPRESSED OR HOPELESS: 0
4. FEELING TIRED OR HAVING LITTLE ENERGY: 2

## 2023-05-15 ASSESSMENT — LIFESTYLE VARIABLES
HOW OFTEN DO YOU HAVE A DRINK CONTAINING ALCOHOL: NEVER
HOW MANY STANDARD DRINKS CONTAINING ALCOHOL DO YOU HAVE ON A TYPICAL DAY: PATIENT DOES NOT DRINK

## 2023-05-15 NOTE — PATIENT INSTRUCTIONS
liability for your use of this information. Personalized Preventive Plan for [facility] - 5/15/2023  Medicare offers a range of preventive health benefits. Some of the tests and screenings are paid in full while other may be subject to a deductible, co-insurance, and/or copay. Some of these benefits include a comprehensive review of your medical history including lifestyle, illnesses that may run in your family, and various assessments and screenings as appropriate. After reviewing your medical record and screening and assessments performed today your provider may have ordered immunizations, labs, imaging, and/or referrals for you. A list of these orders (if applicable) as well as your Preventive Care list are included within your After Visit Summary for your review. Other Preventive Recommendations:    A preventive eye exam performed by an eye specialist is recommended every 1-2 years to screen for glaucoma; cataracts, macular degeneration, and other eye disorders. A preventive dental visit is recommended every 6 months. Try to get at least 150 minutes of exercise per week or 10,000 steps per day on a pedometer . Order or download the FREE \"Exercise & Physical Activity: Your Everyday Guide\" from [employer]. Call 9-558.607.7182 or search [employer] online. You need 0591-8540 mg of calcium and 7138-3971 IU of vitamin D per day. It is possible to meet your calcium requirement with diet alone, but a vitamin D supplement is usually necessary to meet this goal.  When exposed to the sun, use a sunscreen that protects against both UVA and UVB radiation with an SPF of 30 or greater. Reapply every 2 to 3 hours or after sweating, drying off with a towel, or swimming. Always wear a seat belt when traveling in a car. Always wear a helmet when riding a bicycle or motorcycle.

## 2023-05-15 NOTE — PROGRESS NOTES
Medicare Annual Wellness Visit    Sean Rosario is here for Medicare AWV    Assessment & Plan   Initial Medicare annual wellness visit  Personal history of tobacco use  -     CT Lung Screening; Future  Asymptomatic menopausal state  -     DEXA Bone Density Axial Skeleton; Future  Need for hepatitis C screening test  -     Hepatitis C Antibody; Future  Other problems related to lifestyle  -     Hepatitis C Antibody; Future    Recommendations for Preventive Services Due: see orders and patient instructions/AVS.  Recommended screening schedule for the next 5-10 years is provided to the patient in written form: see Patient Instructions/AVS.     Return in 1 year (on 5/15/2024). Subjective     Patient's complete Health Risk Assessment and screening values have been reviewed and are found in Flowsheets. The following problems were reviewed today and where indicated follow up appointments were made and/or referrals ordered. Positive Risk Factor Screenings with Interventions:                 Weight and Activity:  Physical Activity: Inactive    Days of Exercise per Week: 0 days    Minutes of Exercise per Session: 0 min     On average, how many days per week do you engage in moderate to strenuous exercise (like a brisk walk)?: 0 days  Have you lost any weight without trying in the past 3 months?: (!) Yes (has lost 5 lbs)  Body mass index is 33.11 kg/m².  (!) Abnormal    Inactivity Interventions:  See AVS for additional education material    Unintentional Weight Loss Interventions:  See AVS for additional education material  Obesity Interventions:  See AVS for additional education material             Safety:  Do you have working smoke detectors?: (!) No  Do you have either shower bars, grab bars, non-slip mats or non-slip surfaces in your shower or bathtub?: (!) No  Do you always fasten your seatbelt when you are in a car?: (!) No  Interventions:  See AVS for additional education material      Tobacco Use:  Tobacco

## 2023-05-15 NOTE — PROGRESS NOTES
[facility]  [address] [facility] [address]  Dept: [phone]  Dept Fax: [phone]  Loc: [phone]     DILSHAD Rosario (: 1955) is a 75 y.o. female, Established patient, here for evaluation of the following chief complaint(s):  Follow-up (2 mo) and Diabetes      PCP:  ALY Hartmann CNP      HPI     Follow up for Mental Health:    Was started on prozac at her last visit in march. States prozac is working more than zoloft. Psychology or FM managed: Family Medicine  Depression: Yes  Anxiety: Yes  Sleeping pattern: normal  Mood swings: No  Generally feeling happy: Yes  Medication adherence: yes takes medication as prescribed  Side effects: None  Suicidal Thoughts: None  Most recent GODFREY score:   GODFREY-7 SCREENING 3/6/2023   Feeling nervous, anxious, or on edge Several days   Not being able to stop or control worrying Not at all   Worrying too much about different things Several days   Trouble relaxing Not at all   Becoming easily annoyed or irritable Several days   Feeling afraid as if something awful might happen Not at all   How difficult have these problems made it for you to do your work, take care of things at home, or get along with other people? Not difficult at all      Most recent PHQ9 score:   PHQ-9  5/15/2023   Little interest or pleasure in doing things 0   Feeling down, depressed, or hopeless 0   Trouble falling or staying asleep, or sleeping too much 1   Feeling tired or having little energy 2   Poor appetite or overeating 0   Feeling bad about yourself - or that you are a failure or have let yourself or your family down 0   Trouble concentrating on things, such as reading the newspaper or watching television 0   Moving or speaking so slowly that other people could have noticed.  Or the opposite - being so fidgety or restless that you have been

## 2023-06-08 DIAGNOSIS — Z72.89 OTHER PROBLEMS RELATED TO LIFESTYLE: ICD-10-CM

## 2023-06-08 DIAGNOSIS — E78.2 HYPERLIPEMIA, MIXED: ICD-10-CM

## 2023-06-08 DIAGNOSIS — Z11.59 NEED FOR HEPATITIS C SCREENING TEST: ICD-10-CM

## 2023-06-08 LAB
CHOLEST SERPL-MCNC: 134 MG/DL (ref 0–199)
HDLC SERPL-MCNC: 36 MG/DL (ref 40–59)
LDL CHOLESTEROL CALCULATED: 69 MG/DL (ref 0–129)
TRIGLYCERIDE, FASTING: 145 MG/DL (ref 0–150)

## 2023-06-09 DIAGNOSIS — E11.65 TYPE 2 DIABETES MELLITUS WITH HYPERGLYCEMIA, WITHOUT LONG-TERM CURRENT USE OF INSULIN (HCC): ICD-10-CM

## 2023-06-09 LAB
CREAT UR-MCNC: 44.4 MG/DL
HEPATITIS C ANTIBODY: NONREACTIVE
MICROALBUMIN UR-MCNC: <1.2 MG/DL
MICROALBUMIN/CREAT UR-RTO: NORMAL MG/G (ref 0–30)

## 2023-06-27 DIAGNOSIS — F33.0 MAJOR DEPRESSIVE DISORDER, RECURRENT EPISODE, MILD (HCC): ICD-10-CM

## 2023-06-27 DIAGNOSIS — E78.1 HYPERTRIGLYCERIDEMIA: ICD-10-CM

## 2023-06-27 RX ORDER — FENOFIBRATE 48 MG/1
48 TABLET, COATED ORAL DAILY
Qty: 90 TABLET | Refills: 0 | OUTPATIENT
Start: 2023-06-27

## 2023-06-27 RX ORDER — FLUOXETINE HYDROCHLORIDE 20 MG/1
20 CAPSULE ORAL DAILY
Qty: 90 CAPSULE | Refills: 0 | OUTPATIENT
Start: 2023-06-27

## 2023-07-03 ENCOUNTER — TELEPHONE (OUTPATIENT)
Dept: PHARMACY | Facility: CLINIC | Age: 68
End: 2023-07-03

## 2023-07-03 NOTE — TELEPHONE ENCOUNTER
POPULATION HEALTH CLINICAL PHARMACY: ADHERENCE REVIEW  Identified care gap per [facility] ([place]). Per insurer report, LIS-0 - co-pays are based on tiers and patient is subject to coverage gap.    fills with [facility]: Diabetes and Statin adherence    Patient also appears to be prescribed:No Others in the metric at this time      [address] [place] [address] Records claims through 06/26/2023 (Prior Year [address] = not reported; YTD [address] = FIRST FILL; Potential Fail Date: 07.11.23):   metformin last filled on 02.05.23 for 90 day supply. Next refill due: 05.06.23    Per Insurer Portal: last filled on (same as above). .    Lab Results   Component Value Date    LABA1C 6.1 (H) 01/23/2023    LABA1C 6.2 (H) 07/18/2022    LABA1C 6.2 (H) 01/18/2022     NOTE: A1c <9%     [facility] Records claims through 06/26/2023 (Prior Year PDC = not reported; YTD [address] =  FAILED):   atorvastatin last filled on 01.16.23 for 90 day supply. Next refill due: 04.16.23    Per Insurer Portal: last filled on (same as above). .    Lab Results   Component Value Date    CHOL 147 03/09/2023    TRIG 228 (H) 03/09/2023    HDL 36 (L) 06/08/2023    LDLCALC 69 06/08/2023     ALT   Date Value Ref Range Status   05/15/2019 13 0 - 33 U/L Final     AST   Date Value Ref Range Status   05/15/2019 10 0 - 35 U/L Final     The 10-year ASCVD risk score (Anjel GUTIERREZ, et al., 2019) is: 32.4%    Values used to calculate the score:      Age: 70 years      Sex: Female      Is Non- : No      Diabetic: Yes      Tobacco smoker: Yes      Systolic Blood Pressure: 451 mmHg      Is BP treated: Yes      HDL Cholesterol: 36 mg/dL      Total Cholesterol: 134 mg/dL        PLAN  The following are interventions that have been identified:   Patient overdue refilling Metformin & Atorvastatin and active on home medication list.     Outreach:  Attempting to reach patient to review. Left message asking for return call.  Letter sent to

## 2023-07-24 ENCOUNTER — HOSPITAL ENCOUNTER (OUTPATIENT)
Dept: WOMENS IMAGING | Age: 68
Discharge: HOME | End: 2023-07-26
Payer: MEDICARE

## 2023-07-24 DIAGNOSIS — E03.9 ACQUIRED HYPOTHYROIDISM: ICD-10-CM

## 2023-07-24 DIAGNOSIS — E11.65 UNCONTROLLED TYPE 2 DIABETES MELLITUS WITH HYPERGLYCEMIA (HCC): ICD-10-CM

## 2023-07-24 DIAGNOSIS — Z12.31 ENCOUNTER FOR SCREENING MAMMOGRAM FOR MALIGNANT NEOPLASM OF BREAST: ICD-10-CM

## 2023-07-24 LAB
ANION GAP SERPL CALCULATED.3IONS-SCNC: 11 MEQ/L (ref 9–15)
BUN SERPL-MCNC: 20 MG/DL (ref 8–23)
CALCIUM SERPL-MCNC: 9.5 MG/DL (ref 8.5–9.9)
CHLORIDE SERPL-SCNC: 106 MEQ/L (ref 95–107)
CO2 SERPL-SCNC: 24 MEQ/L (ref 20–31)
CREAT SERPL-MCNC: 0.72 MG/DL (ref 0.5–0.9)
GLUCOSE SERPL-MCNC: 103 MG/DL (ref 70–99)
HBA1C MFR BLD: 6.2 % (ref 4.8–5.9)
POTASSIUM SERPL-SCNC: 4.3 MEQ/L (ref 3.4–4.9)
SODIUM SERPL-SCNC: 141 MEQ/L (ref 135–144)
T4 FREE SERPL-MCNC: 1.47 NG/DL (ref 0.84–1.68)
TSH REFLEX: 0.96 UIU/ML (ref 0.44–3.86)

## 2023-07-24 PROCEDURE — 77063 BREAST TOMOSYNTHESIS BI: CPT

## 2023-07-26 ENCOUNTER — OFFICE VISIT (OUTPATIENT)
Dept: ENDOCRINOLOGY | Age: 68
End: 2023-07-26

## 2023-07-26 VITALS
SYSTOLIC BLOOD PRESSURE: 126 MMHG | BODY MASS INDEX: 32.76 KG/M2 | HEART RATE: 68 BPM | DIASTOLIC BLOOD PRESSURE: 72 MMHG | WEIGHT: 178 LBS | HEIGHT: 62 IN | OXYGEN SATURATION: 96 %

## 2023-07-26 DIAGNOSIS — E03.9 ACQUIRED HYPOTHYROIDISM: ICD-10-CM

## 2023-07-26 DIAGNOSIS — B35.1 ONYCHOMYCOSIS: ICD-10-CM

## 2023-07-26 DIAGNOSIS — E11.65 UNCONTROLLED TYPE 2 DIABETES MELLITUS WITH HYPERGLYCEMIA (HCC): Primary | ICD-10-CM

## 2023-07-26 LAB
CHP ED QC CHECK: ABNORMAL
GLUCOSE BLD-MCNC: 196 MG/DL

## 2023-07-26 NOTE — PROGRESS NOTES
No discharge. Left eye: No discharge. Extraocular Movements: Extraocular movements intact. Conjunctiva/sclera: Conjunctivae normal.   Cardiovascular:      Rate and Rhythm: Normal rate. Pulmonary:      Effort: Pulmonary effort is normal.   Musculoskeletal:         General: Normal range of motion. Cervical back: Normal range of motion and neck supple. Feet:    Skin:     Findings: No lesion or rash. Neurological:      General: No focal deficit present. Mental Status: She is alert and oriented to person, place, and time.    Psychiatric:         Mood and Affect: Mood normal.         Behavior: Behavior normal.

## 2023-08-08 RX ORDER — AMLODIPINE BESYLATE 10 MG/1
TABLET ORAL
Qty: 90 TABLET | Refills: 3 | Status: SHIPPED | OUTPATIENT
Start: 2023-08-08

## 2023-08-08 RX ORDER — ATORVASTATIN CALCIUM 10 MG/1
10 TABLET, FILM COATED ORAL DAILY
Qty: 90 TABLET | Refills: 3 | Status: SHIPPED | OUTPATIENT
Start: 2023-08-08

## 2023-08-08 NOTE — TELEPHONE ENCOUNTER
Patient requesting medication refill.  Please approve or deny this request.    Rx requested:  Requested Prescriptions     Pending Prescriptions Disp Refills    atorvastatin (LIPITOR) 10 MG tablet 90 tablet 3     Sig: Take 1 tablet by mouth daily    amLODIPine (NORVASC) 10 MG tablet 90 tablet 3     Sig: TAKE 1 TABLET DAILY         Last Office Visit:   7/26/2023      Next Visit Date:  Future Appointments   Date Time Provider [address] [place] [address]   8/15/2023 11:00 AM ALY Miranda - CNP [facility]   1/24/2024 11:00 AM Jagruti Mcmahan MD [facility]

## 2023-08-12 DIAGNOSIS — F33.0 MAJOR DEPRESSIVE DISORDER, RECURRENT EPISODE, MILD (HCC): ICD-10-CM

## 2023-08-13 NOTE — TELEPHONE ENCOUNTER
Pharmacy requesting medication refill. Please approve or deny this request.    Rx requested:  Requested Prescriptions     Pending Prescriptions Disp Refills    FLUoxetine (PROZAC) 20 MG capsule [Pharmacy Med Name: fluoxetine 20 mg capsule] 30 capsule 1     Sig: TAKE 1 CAPSULE BY MOUTH DAILY.          Last Office Visit:   5/15/2023      Next Visit Date:  Future Appointments   Date Time Provider [address] [place] [address]   8/15/2023 11:00 AM ALY Miranda - CNP [facility]   1/24/2024 11:00 AM Jagruti Mcmahan MD [facility]

## 2023-08-14 RX ORDER — FLUOXETINE HYDROCHLORIDE 20 MG/1
20 CAPSULE ORAL DAILY
Qty: 90 CAPSULE | Refills: 2 | Status: SHIPPED | OUTPATIENT
Start: 2023-08-14 | End: 2023-08-15 | Stop reason: SDUPTHER

## 2023-08-15 ENCOUNTER — OFFICE VISIT (OUTPATIENT)
Dept: FAMILY MEDICINE CLINIC | Age: 68
End: 2023-08-15
Payer: MEDICARE

## 2023-08-15 VITALS
RESPIRATION RATE: 14 BRPM | HEART RATE: 82 BPM | OXYGEN SATURATION: 94 % | DIASTOLIC BLOOD PRESSURE: 70 MMHG | HEIGHT: 62 IN | SYSTOLIC BLOOD PRESSURE: 120 MMHG | TEMPERATURE: 97.2 F | BODY MASS INDEX: 32.94 KG/M2 | WEIGHT: 179 LBS

## 2023-08-15 DIAGNOSIS — E78.1 HYPERTRIGLYCERIDEMIA: ICD-10-CM

## 2023-08-15 DIAGNOSIS — I10 PRIMARY HYPERTENSION: ICD-10-CM

## 2023-08-15 DIAGNOSIS — E78.2 HYPERLIPEMIA, MIXED: Primary | ICD-10-CM

## 2023-08-15 DIAGNOSIS — F33.0 MAJOR DEPRESSIVE DISORDER, RECURRENT EPISODE, MILD (HCC): ICD-10-CM

## 2023-08-15 DIAGNOSIS — E03.9 HYPOTHYROIDISM, UNSPECIFIED TYPE: ICD-10-CM

## 2023-08-15 DIAGNOSIS — E11.65 TYPE 2 DIABETES MELLITUS WITH HYPERGLYCEMIA, WITHOUT LONG-TERM CURRENT USE OF INSULIN (HCC): ICD-10-CM

## 2023-08-15 PROCEDURE — 99214 OFFICE O/P EST MOD 30 MIN: CPT | Performed by: NURSE PRACTITIONER

## 2023-08-15 PROCEDURE — 3078F DIAST BP <80 MM HG: CPT | Performed by: NURSE PRACTITIONER

## 2023-08-15 PROCEDURE — 3017F COLORECTAL CA SCREEN DOC REV: CPT | Performed by: NURSE PRACTITIONER

## 2023-08-15 PROCEDURE — 4004F PT TOBACCO SCREEN RCVD TLK: CPT | Performed by: NURSE PRACTITIONER

## 2023-08-15 PROCEDURE — 3074F SYST BP LT 130 MM HG: CPT | Performed by: NURSE PRACTITIONER

## 2023-08-15 PROCEDURE — 2022F DILAT RTA XM EVC RTNOPTHY: CPT | Performed by: NURSE PRACTITIONER

## 2023-08-15 PROCEDURE — 1123F ACP DISCUSS/DSCN MKR DOCD: CPT | Performed by: NURSE PRACTITIONER

## 2023-08-15 PROCEDURE — 1090F PRES/ABSN URINE INCON ASSESS: CPT | Performed by: NURSE PRACTITIONER

## 2023-08-15 PROCEDURE — 3044F HG A1C LEVEL LT 7.0%: CPT | Performed by: NURSE PRACTITIONER

## 2023-08-15 PROCEDURE — G8427 DOCREV CUR MEDS BY ELIG CLIN: HCPCS | Performed by: NURSE PRACTITIONER

## 2023-08-15 PROCEDURE — G8417 CALC BMI ABV UP PARAM F/U: HCPCS | Performed by: NURSE PRACTITIONER

## 2023-08-15 PROCEDURE — G8399 PT W/DXA RESULTS DOCUMENT: HCPCS | Performed by: NURSE PRACTITIONER

## 2023-08-15 RX ORDER — FENOFIBRATE 48 MG/1
48 TABLET, COATED ORAL DAILY
Qty: 90 TABLET | Refills: 3 | Status: SHIPPED | OUTPATIENT
Start: 2023-08-15

## 2023-08-15 RX ORDER — FLUOXETINE HYDROCHLORIDE 20 MG/1
20 CAPSULE ORAL DAILY
Qty: 90 CAPSULE | Refills: 2 | Status: SHIPPED | OUTPATIENT
Start: 2023-08-15

## 2023-08-15 ASSESSMENT — ENCOUNTER SYMPTOMS
RESPIRATORY NEGATIVE: 1
COUGH: 0
SHORTNESS OF BREATH: 0
BLOOD IN STOOL: 0
WHEEZING: 0

## 2023-08-15 NOTE — PROGRESS NOTES
[address] [facility] PRIMARY AND SPECIALTY CARE  [address] [address] [facility] [address]  Dept: [phone]  Dept Fax: [phone]  Loc: [phone]     DILSHAD Zamudio (: 1955) is a 70 y.o. female, Established patient, here for evaluation of the following chief complaint(s):  Depression (Would like prozac medication sent to mail order pharmacy not local pharmacy), Hypertension, Diabetes, Hypothyroidism, and Hyperlipidemia (Med discussion should she be taking 2 medications  for cholesterol (atorvastatin and tricor))      PCP:  ALY Maradiaga CNP      HPI    . Diabetes:  Do you have low blood sugar readings/symptoms? no  Do you have high blood sugar readings/symptoms? no  Are you compliant with your diet? yes  Do you exercise? no  Are you compliant with your medications? yes  Are you having difficulty affording your medications? no  Do you have any of the following symptoms? Vision problems? no  GI-Nausea/committing/bloating? no  Lightheadedness? no  Paresthesias, Ulcerations or sores? No    Diabetic Exams up to date? Last a1c? Lab Results   Component Value Date    LABA1C 6.2 (H) 2023     No results found for: EAG   Urine Microalbumin:   Last diabetic eye exam:     Hypertension:    How often are you checking your blood pressure? -  Are you compliant with your diet? Yes  Do you exercise? No  Are you compliant with your medications? Yes  Are you having difficulty affording your medications? No  Do you have side effects from the medication? No  Do you have any of the following symptoms? Chest Pain? No  Palpitations? No  MCMANUS/SOB? No  Headache? No  Peripheral Edema? No  Light Headedness? No    Hyperlipidemia:    Patient is here for follow up on hyperlipidemia:    Are you compliant with your medications?  yes  Are you having difficulty affording your medications? no  Do you have side effects from the

## 2023-11-16 NOTE — TELEPHONE ENCOUNTER
Patient returned call. She said that she is taking both medication as prescribed. Called [facility] a few days ago and should be getting her atorvastatin soon. She said that she has a few months worth of Metformin on hand and does not need refills at this time. Chart(s)/Patient/Spouse/Significant Other

## 2023-12-26 ENCOUNTER — OFFICE VISIT (OUTPATIENT)
Dept: FAMILY MEDICINE CLINIC | Age: 68
End: 2023-12-26
Payer: MEDICARE

## 2023-12-26 VITALS
WEIGHT: 175 LBS | SYSTOLIC BLOOD PRESSURE: 126 MMHG | BODY MASS INDEX: 32.2 KG/M2 | OXYGEN SATURATION: 97 % | HEIGHT: 62 IN | TEMPERATURE: 98.3 F | DIASTOLIC BLOOD PRESSURE: 62 MMHG | HEART RATE: 93 BPM

## 2023-12-26 DIAGNOSIS — B96.89 ACUTE BACTERIAL SINUSITIS: ICD-10-CM

## 2023-12-26 DIAGNOSIS — R05.9 COUGH, UNSPECIFIED TYPE: ICD-10-CM

## 2023-12-26 DIAGNOSIS — B97.89 SORE THROAT (VIRAL): ICD-10-CM

## 2023-12-26 DIAGNOSIS — J02.8 SORE THROAT (VIRAL): ICD-10-CM

## 2023-12-26 DIAGNOSIS — J06.9 VIRAL URI WITH COUGH: Primary | ICD-10-CM

## 2023-12-26 DIAGNOSIS — J01.90 ACUTE BACTERIAL SINUSITIS: ICD-10-CM

## 2023-12-26 LAB
Lab: NORMAL
PERFORMING INSTRUMENT: NORMAL
QC PASS/FAIL: NORMAL
S PYO AG THROAT QL: NORMAL
SARS-COV-2, POC: NORMAL

## 2023-12-26 PROCEDURE — 99213 OFFICE O/P EST LOW 20 MIN: CPT

## 2023-12-26 PROCEDURE — 87880 STREP A ASSAY W/OPTIC: CPT

## 2023-12-26 PROCEDURE — 4004F PT TOBACCO SCREEN RCVD TLK: CPT

## 2023-12-26 PROCEDURE — G8484 FLU IMMUNIZE NO ADMIN: HCPCS

## 2023-12-26 PROCEDURE — G8399 PT W/DXA RESULTS DOCUMENT: HCPCS

## 2023-12-26 PROCEDURE — G8427 DOCREV CUR MEDS BY ELIG CLIN: HCPCS

## 2023-12-26 PROCEDURE — 1123F ACP DISCUSS/DSCN MKR DOCD: CPT

## 2023-12-26 PROCEDURE — 3074F SYST BP LT 130 MM HG: CPT

## 2023-12-26 PROCEDURE — 3017F COLORECTAL CA SCREEN DOC REV: CPT

## 2023-12-26 PROCEDURE — G8417 CALC BMI ABV UP PARAM F/U: HCPCS

## 2023-12-26 PROCEDURE — 87426 SARSCOV CORONAVIRUS AG IA: CPT

## 2023-12-26 PROCEDURE — 1090F PRES/ABSN URINE INCON ASSESS: CPT

## 2023-12-26 PROCEDURE — 3078F DIAST BP <80 MM HG: CPT

## 2023-12-26 RX ORDER — FLUTICASONE PROPIONATE 50 MCG
2 SPRAY, SUSPENSION (ML) NASAL DAILY
Qty: 16 G | Refills: 0 | Status: SHIPPED | OUTPATIENT
Start: 2023-12-26

## 2023-12-26 RX ORDER — AMOXICILLIN AND CLAVULANATE POTASSIUM 875; 125 MG/1; MG/1
1 TABLET, FILM COATED ORAL 2 TIMES DAILY
Qty: 14 TABLET | Refills: 0 | Status: SHIPPED | OUTPATIENT
Start: 2023-12-26 | End: 2024-01-02

## 2023-12-26 RX ORDER — IBUPROFEN 800 MG/1
800 TABLET ORAL EVERY 8 HOURS PRN
Qty: 90 TABLET | Refills: 0 | Status: SHIPPED | OUTPATIENT
Start: 2023-12-26 | End: 2024-01-25

## 2023-12-26 NOTE — PROGRESS NOTES
Congestion: 10 mL (guaifenesin 200 mg/dextromethorphan 20 mg) every 4 hours (maximum: 6 doses/24 hours)  - Gargle with warm salt water several times a day to help reduce swelling and relieve pain. Mix 1/2 teaspoon of salt in 1 cup of warm water. - Drink plenty of fluids. Fluids may help soothe an irritated throat. Hot fluids, such as tea or soup, may help decrease throat pain. - Use over-the-counter throat lozenges to soothe pain. Regular cough drops or hard candy may also help. - Use a vaporizer or humidifier to add moisture to your bedroom.     Electronically signed by ALY Sibley CNP on 12/26/23 at 4:15 PM EST

## 2023-12-29 DIAGNOSIS — E03.9 ACQUIRED HYPOTHYROIDISM: ICD-10-CM

## 2023-12-29 DIAGNOSIS — E11.65 UNCONTROLLED TYPE 2 DIABETES MELLITUS WITH HYPERGLYCEMIA (HCC): ICD-10-CM

## 2024-01-02 RX ORDER — LEVOTHYROXINE SODIUM 0.1 MG/1
TABLET ORAL
Qty: 90 TABLET | Refills: 3 | Status: SHIPPED | OUTPATIENT
Start: 2024-01-02

## 2024-01-22 DIAGNOSIS — E03.9 ACQUIRED HYPOTHYROIDISM: ICD-10-CM

## 2024-01-22 DIAGNOSIS — E11.65 UNCONTROLLED TYPE 2 DIABETES MELLITUS WITH HYPERGLYCEMIA (HCC): ICD-10-CM

## 2024-01-22 LAB
ANION GAP SERPL CALCULATED.3IONS-SCNC: 10 MEQ/L (ref 9–15)
BUN SERPL-MCNC: 16 MG/DL (ref 8–23)
CALCIUM SERPL-MCNC: 9.1 MG/DL (ref 8.5–9.9)
CHLORIDE SERPL-SCNC: 107 MEQ/L (ref 95–107)
CO2 SERPL-SCNC: 26 MEQ/L (ref 20–31)
CREAT SERPL-MCNC: 0.64 MG/DL (ref 0.5–0.9)
GLUCOSE SERPL-MCNC: 91 MG/DL (ref 70–99)
HBA1C MFR BLD: 5.8 % (ref 4.8–5.9)
POTASSIUM SERPL-SCNC: 4.2 MEQ/L (ref 3.4–4.9)
SODIUM SERPL-SCNC: 143 MEQ/L (ref 135–144)
T4 FREE SERPL-MCNC: 1.42 NG/DL (ref 0.84–1.68)
TSH REFLEX: 1.37 UIU/ML (ref 0.44–3.86)

## 2024-01-24 ENCOUNTER — OFFICE VISIT (OUTPATIENT)
Dept: ENDOCRINOLOGY | Age: 69
End: 2024-01-24
Payer: MEDICARE

## 2024-01-24 VITALS
DIASTOLIC BLOOD PRESSURE: 74 MMHG | SYSTOLIC BLOOD PRESSURE: 113 MMHG | HEIGHT: 62 IN | HEART RATE: 77 BPM | WEIGHT: 175 LBS | BODY MASS INDEX: 32.2 KG/M2 | OXYGEN SATURATION: 95 %

## 2024-01-24 DIAGNOSIS — E03.9 ACQUIRED HYPOTHYROIDISM: ICD-10-CM

## 2024-01-24 DIAGNOSIS — E11.65 UNCONTROLLED TYPE 2 DIABETES MELLITUS WITH HYPERGLYCEMIA (HCC): Primary | ICD-10-CM

## 2024-01-24 LAB
CHP ED QC CHECK: NORMAL
GLUCOSE BLD-MCNC: 162 MG/DL

## 2024-01-24 PROCEDURE — 99213 OFFICE O/P EST LOW 20 MIN: CPT | Performed by: INTERNAL MEDICINE

## 2024-01-24 PROCEDURE — 4004F PT TOBACCO SCREEN RCVD TLK: CPT | Performed by: INTERNAL MEDICINE

## 2024-01-24 PROCEDURE — G8484 FLU IMMUNIZE NO ADMIN: HCPCS | Performed by: INTERNAL MEDICINE

## 2024-01-24 PROCEDURE — 82962 GLUCOSE BLOOD TEST: CPT | Performed by: INTERNAL MEDICINE

## 2024-01-24 PROCEDURE — 3017F COLORECTAL CA SCREEN DOC REV: CPT | Performed by: INTERNAL MEDICINE

## 2024-01-24 PROCEDURE — G8427 DOCREV CUR MEDS BY ELIG CLIN: HCPCS | Performed by: INTERNAL MEDICINE

## 2024-01-24 PROCEDURE — 3044F HG A1C LEVEL LT 7.0%: CPT | Performed by: INTERNAL MEDICINE

## 2024-01-24 PROCEDURE — 3078F DIAST BP <80 MM HG: CPT | Performed by: INTERNAL MEDICINE

## 2024-01-24 PROCEDURE — 1123F ACP DISCUSS/DSCN MKR DOCD: CPT | Performed by: INTERNAL MEDICINE

## 2024-01-24 PROCEDURE — 2022F DILAT RTA XM EVC RTNOPTHY: CPT | Performed by: INTERNAL MEDICINE

## 2024-01-24 PROCEDURE — 1090F PRES/ABSN URINE INCON ASSESS: CPT | Performed by: INTERNAL MEDICINE

## 2024-01-24 PROCEDURE — G8399 PT W/DXA RESULTS DOCUMENT: HCPCS | Performed by: INTERNAL MEDICINE

## 2024-01-24 PROCEDURE — G8417 CALC BMI ABV UP PARAM F/U: HCPCS | Performed by: INTERNAL MEDICINE

## 2024-01-24 PROCEDURE — 3074F SYST BP LT 130 MM HG: CPT | Performed by: INTERNAL MEDICINE

## 2024-01-24 NOTE — PROGRESS NOTES
Monitoring Suppl MIGUEL ANGEL, Test BG once daily, DX: E11.65, NIDDM, Disp: 1 Device, Rfl: 0    Alcohol Swabs PADS, Bid, Disp: 100 each, Rfl: 06  Lab Results   Component Value Date     01/22/2024    K 4.2 01/22/2024     01/22/2024    CO2 26 01/22/2024    BUN 16 01/22/2024    CREATININE 0.64 01/22/2024    GLUCOSE 91 01/22/2024    CALCIUM 9.1 01/22/2024    PROT 7.1 05/15/2019    LABALBU 4.2 05/15/2019    BILITOT 0.4 05/15/2019    ALKPHOS 54 05/15/2019    AST 10 05/15/2019    ALT 13 05/15/2019    LABGLOM >60.0 01/22/2024    GFRAA >60.0 07/18/2022     Lab Results   Component Value Date    WBC 10.5 09/04/2021    HGB 10.7 (L) 09/04/2021    HCT 31.4 (L) 09/04/2021    MCV 85.9 09/04/2021     09/04/2021     Lab Results   Component Value Date    LABA1C 5.8 01/22/2024    LABA1C 6.2 (H) 07/24/2023    LABA1C 6.1 (H) 01/23/2023     Lab Results   Component Value Date    CHOLFAST 134 06/08/2023    CHOLFAST 135 07/18/2022    TRIGLYCFAST 145 06/08/2023    TRIGLYCFAST 234 (H) 07/18/2022    HDL 36 (L) 06/08/2023    HDL 32 (L) 03/09/2023    HDL 34 (L) 07/18/2022    LDLCALC 69 06/08/2023    LDLCALC 69 03/09/2023    LDLCALC 54 07/18/2022    CHOL 147 03/09/2023    CHOL 126 11/30/2020    CHOL 171 05/18/2020    TRIG 228 (H) 03/09/2023    TRIG 176 (H) 11/30/2020    TRIG 261 (H) 05/18/2020     No results found for: \"TESTM\"  Lab Results   Component Value Date    TSH 1.320 01/23/2023    TSH 1.850 01/18/2022    TSH 1.730 11/30/2020    TSHREFLEX 1.370 01/22/2024    TSHREFLEX 0.964 07/24/2023    TSHREFLEX 0.940 07/18/2022    T4FREE 1.42 01/22/2024    T4FREE 1.47 07/24/2023    T4FREE 1.28 01/23/2023     No results found for: \"TPOABS\"    Review of Systems   Cardiovascular:  Negative for palpitations.   Endocrine: Negative for cold intolerance, heat intolerance, polydipsia and polyuria.   Musculoskeletal:  Positive for arthralgias.   Neurological:  Negative for tremors.   All other systems reviewed and are negative.      Objective:

## 2024-02-12 DIAGNOSIS — F33.0 MAJOR DEPRESSIVE DISORDER, RECURRENT EPISODE, MILD (HCC): ICD-10-CM

## 2024-02-12 NOTE — TELEPHONE ENCOUNTER
Please approve or deny request. Thank you!    Rx requested:  Requested Prescriptions     Pending Prescriptions Disp Refills    FLUoxetine (PROZAC) 20 MG capsule [Pharmacy Med Name: fluoxetine 20 mg capsule] 90 capsule 2     Sig: TAKE 1 CAPSULE BY MOUTH DAILY.         Last Office Visit:   8/15/2023      Next Visit Date:  Future Appointments   Date Time Provider Department Center   2/13/2024 11:15 AM Marisol Kendrick, APRN - CNP [facility]   7/24/2024 11:00 AM Yasmany Vasquez MD [facility]

## 2024-02-13 ENCOUNTER — OFFICE VISIT (OUTPATIENT)
Dept: FAMILY MEDICINE CLINIC | Age: 69
End: 2024-02-13
Payer: MEDICARE

## 2024-02-13 VITALS
TEMPERATURE: 97 F | OXYGEN SATURATION: 99 % | HEIGHT: 62 IN | BODY MASS INDEX: 32.57 KG/M2 | HEART RATE: 69 BPM | WEIGHT: 177 LBS | SYSTOLIC BLOOD PRESSURE: 124 MMHG | DIASTOLIC BLOOD PRESSURE: 62 MMHG

## 2024-02-13 DIAGNOSIS — I10 PRIMARY HYPERTENSION: ICD-10-CM

## 2024-02-13 DIAGNOSIS — E78.2 HYPERLIPEMIA, MIXED: ICD-10-CM

## 2024-02-13 DIAGNOSIS — F33.1 MODERATE EPISODE OF RECURRENT MAJOR DEPRESSIVE DISORDER (HCC): ICD-10-CM

## 2024-02-13 DIAGNOSIS — J30.2 SEASONAL ALLERGIES: ICD-10-CM

## 2024-02-13 DIAGNOSIS — E03.9 HYPOTHYROIDISM, UNSPECIFIED TYPE: Primary | ICD-10-CM

## 2024-02-13 PROCEDURE — G8427 DOCREV CUR MEDS BY ELIG CLIN: HCPCS | Performed by: NURSE PRACTITIONER

## 2024-02-13 PROCEDURE — G8417 CALC BMI ABV UP PARAM F/U: HCPCS | Performed by: NURSE PRACTITIONER

## 2024-02-13 PROCEDURE — 99214 OFFICE O/P EST MOD 30 MIN: CPT | Performed by: NURSE PRACTITIONER

## 2024-02-13 PROCEDURE — 3078F DIAST BP <80 MM HG: CPT | Performed by: NURSE PRACTITIONER

## 2024-02-13 PROCEDURE — 3074F SYST BP LT 130 MM HG: CPT | Performed by: NURSE PRACTITIONER

## 2024-02-13 PROCEDURE — 3017F COLORECTAL CA SCREEN DOC REV: CPT | Performed by: NURSE PRACTITIONER

## 2024-02-13 PROCEDURE — G8399 PT W/DXA RESULTS DOCUMENT: HCPCS | Performed by: NURSE PRACTITIONER

## 2024-02-13 PROCEDURE — G8484 FLU IMMUNIZE NO ADMIN: HCPCS | Performed by: NURSE PRACTITIONER

## 2024-02-13 PROCEDURE — 1123F ACP DISCUSS/DSCN MKR DOCD: CPT | Performed by: NURSE PRACTITIONER

## 2024-02-13 PROCEDURE — 1090F PRES/ABSN URINE INCON ASSESS: CPT | Performed by: NURSE PRACTITIONER

## 2024-02-13 PROCEDURE — 4004F PT TOBACCO SCREEN RCVD TLK: CPT | Performed by: NURSE PRACTITIONER

## 2024-02-13 RX ORDER — FLUTICASONE PROPIONATE 50 MCG
2 SPRAY, SUSPENSION (ML) NASAL DAILY
Qty: 16 G | Refills: 2 | Status: SHIPPED | OUTPATIENT
Start: 2024-02-13

## 2024-02-13 RX ORDER — FLUOXETINE HYDROCHLORIDE 20 MG/1
20 CAPSULE ORAL DAILY
Qty: 90 CAPSULE | Refills: 2 | Status: SHIPPED | OUTPATIENT
Start: 2024-02-13

## 2024-02-13 NOTE — PROGRESS NOTES
Unstable Housing in the Last Year: No     Family History   Problem Relation Age of Onset    Heart Disease Mother     Cancer Father       No Known Allergies  Prior to Admission medications    Medication Sig Start Date End Date Taking? Authorizing Provider   FLUoxetine (PROZAC) 20 MG capsule TAKE 1 CAPSULE BY MOUTH DAILY. 2/13/24  Yes Marisol Kendrick APRN - CNP   fluticasone (FLONASE) 50 MCG/ACT nasal spray 2 sprays by Each Nostril route daily 2/13/24  Yes Marisol Kendrick APRN - CNP   metFORMIN (GLUCOPHAGE) 500 MG tablet TAKE 1 TABLET BY MOUTH TWICE  DAILY 1/2/24  Yes Yasmany Vasquez MD   levothyroxine (SYNTHROID) 100 MCG tablet TAKE 1 TABLET BY MOUTH DAILY 1/2/24  Yes Yasmany Vasquez MD   fenofibrate (TRICOR) 48 MG tablet Take 1 tablet by mouth daily 8/15/23  Yes Marisol Kendrick APRN - CNP   atorvastatin (LIPITOR) 10 MG tablet Take 1 tablet by mouth daily 8/8/23  Yes Yasmany Vasquez MD   amLODIPine (NORVASC) 10 MG tablet TAKE 1 TABLET DAILY 8/8/23  Yes Yasmany Vasquez MD   blood glucose monitor strips Test BG once daily, DX: E11.9, NIDDM 12/6/18  Yes Yasmany Vasquez MD   Lancets MISC Test BG once daily, DX: E11.65, NIDDM 12/6/18  Yes Yasmany Vasquez MD   Blood Glucose Monitoring Suppl MIGUEL ANGEL Test BG once daily, DX: E11.65, NIDDM 1/6/17  Yes Yasmany Vasquez MD   Alcohol Swabs PADS Bid 8/1/16  Yes Yasmany Vasquez MD   ibuprofen (ADVIL;MOTRIN) 800 MG tablet Take 1 tablet by mouth every 8 hours as needed for Pain 12/26/23 1/25/24  Leni Clarke APRN - CNP                I have reviewed the patient's medical history in detail and updated the computerized patient record.      OBJECTIVE    Vitals:    02/13/24 1123   BP: 124/62   Pulse: 69   Temp: 97 °F (36.1 °C)   TempSrc: Temporal   SpO2: 99%   Weight: 80.3 kg (177 lb)   Height: 1.575 m (5' 2\")       Physical Exam  Constitutional:       Appearance: Normal appearance.   HENT:      Head: Normocephalic.   Cardiovascular:      Rate and Rhythm: Normal rate and regular rhythm.      Heart

## 2024-03-18 DIAGNOSIS — F33.0 MAJOR DEPRESSIVE DISORDER, RECURRENT EPISODE, MILD (HCC): ICD-10-CM

## 2024-03-19 RX ORDER — FLUOXETINE HYDROCHLORIDE 20 MG/1
20 CAPSULE ORAL DAILY
Qty: 90 CAPSULE | Refills: 3 | Status: SHIPPED | OUTPATIENT
Start: 2024-03-19

## 2024-05-16 ENCOUNTER — TELEPHONE (OUTPATIENT)
Dept: FAMILY MEDICINE CLINIC | Age: 69
End: 2024-05-16

## 2024-05-16 DIAGNOSIS — J02.8 SORE THROAT (VIRAL): ICD-10-CM

## 2024-05-16 DIAGNOSIS — B97.89 SORE THROAT (VIRAL): ICD-10-CM

## 2024-05-16 NOTE — TELEPHONE ENCOUNTER
Patient requesting medication refill. Please approve or deny this request.    Rx requested:  Requested Prescriptions     Pending Prescriptions Disp Refills    ibuprofen (ADVIL;MOTRIN) 800 MG tablet 90 tablet 0     Sig: Take 1 tablet by mouth every 8 hours as needed for Pain         Last Office Visit:   2/13/2024      Next Visit Date:  Future Appointments   Date Time Provider Department Center   7/24/2024 11:00 AM Yasmany Vasquez MD [facility]   8/13/2024 11:00 AM Marisol Kendrick, APRN - CNP [facility]

## 2024-05-21 RX ORDER — IBUPROFEN 800 MG/1
800 TABLET ORAL EVERY 8 HOURS PRN
Qty: 90 TABLET | Refills: 0 | Status: SHIPPED | OUTPATIENT
Start: 2024-05-21 | End: 2024-06-20

## 2024-06-06 ENCOUNTER — TELEMEDICINE (OUTPATIENT)
Dept: FAMILY MEDICINE CLINIC | Age: 69
End: 2024-06-06
Payer: MEDICARE

## 2024-06-06 DIAGNOSIS — Z00.00 MEDICARE ANNUAL WELLNESS VISIT, SUBSEQUENT: Primary | ICD-10-CM

## 2024-06-06 PROCEDURE — G0439 PPPS, SUBSEQ VISIT: HCPCS | Performed by: NURSE PRACTITIONER

## 2024-06-06 PROCEDURE — 3017F COLORECTAL CA SCREEN DOC REV: CPT | Performed by: NURSE PRACTITIONER

## 2024-06-06 PROCEDURE — 1123F ACP DISCUSS/DSCN MKR DOCD: CPT | Performed by: NURSE PRACTITIONER

## 2024-06-06 SDOH — ECONOMIC STABILITY: FOOD INSECURITY: WITHIN THE PAST 12 MONTHS, THE FOOD YOU BOUGHT JUST DIDN'T LAST AND YOU DIDN'T HAVE MONEY TO GET MORE.: NEVER TRUE

## 2024-06-06 SDOH — ECONOMIC STABILITY: FOOD INSECURITY: WITHIN THE PAST 12 MONTHS, YOU WORRIED THAT YOUR FOOD WOULD RUN OUT BEFORE YOU GOT MONEY TO BUY MORE.: NEVER TRUE

## 2024-06-06 SDOH — ECONOMIC STABILITY: INCOME INSECURITY: HOW HARD IS IT FOR YOU TO PAY FOR THE VERY BASICS LIKE FOOD, HOUSING, MEDICAL CARE, AND HEATING?: NOT HARD AT ALL

## 2024-06-06 ASSESSMENT — PATIENT HEALTH QUESTIONNAIRE - PHQ9
6. FEELING BAD ABOUT YOURSELF - OR THAT YOU ARE A FAILURE OR HAVE LET YOURSELF OR YOUR FAMILY DOWN: NOT AT ALL
SUM OF ALL RESPONSES TO PHQ9 QUESTIONS 1 & 2: 0
SUM OF ALL RESPONSES TO PHQ QUESTIONS 1-9: 2
2. FEELING DOWN, DEPRESSED OR HOPELESS: NOT AT ALL
9. THOUGHTS THAT YOU WOULD BE BETTER OFF DEAD, OR OF HURTING YOURSELF: NOT AT ALL
7. TROUBLE CONCENTRATING ON THINGS, SUCH AS READING THE NEWSPAPER OR WATCHING TELEVISION: NOT AT ALL
4. FEELING TIRED OR HAVING LITTLE ENERGY: SEVERAL DAYS
10. IF YOU CHECKED OFF ANY PROBLEMS, HOW DIFFICULT HAVE THESE PROBLEMS MADE IT FOR YOU TO DO YOUR WORK, TAKE CARE OF THINGS AT HOME, OR GET ALONG WITH OTHER PEOPLE: NOT DIFFICULT AT ALL
SUM OF ALL RESPONSES TO PHQ QUESTIONS 1-9: 2
1. LITTLE INTEREST OR PLEASURE IN DOING THINGS: NOT AT ALL
3. TROUBLE FALLING OR STAYING ASLEEP: SEVERAL DAYS
SUM OF ALL RESPONSES TO PHQ QUESTIONS 1-9: 2
SUM OF ALL RESPONSES TO PHQ QUESTIONS 1-9: 2
5. POOR APPETITE OR OVEREATING: NOT AT ALL
8. MOVING OR SPEAKING SO SLOWLY THAT OTHER PEOPLE COULD HAVE NOTICED. OR THE OPPOSITE, BEING SO FIGETY OR RESTLESS THAT YOU HAVE BEEN MOVING AROUND A LOT MORE THAN USUAL: NOT AT ALL

## 2024-06-06 ASSESSMENT — LIFESTYLE VARIABLES
HOW MANY STANDARD DRINKS CONTAINING ALCOHOL DO YOU HAVE ON A TYPICAL DAY: PATIENT DOES NOT DRINK
HOW OFTEN DO YOU HAVE A DRINK CONTAINING ALCOHOL: NEVER

## 2024-06-06 NOTE — PROGRESS NOTES
appropriately licensed, registered, or certified to deliver care in the state where the patient is located as indicated above. If you are not or unsure, please re-schedule the visit: Yes, I confirm.

## 2024-06-11 DIAGNOSIS — E78.1 HYPERTRIGLYCERIDEMIA: ICD-10-CM

## 2024-06-12 RX ORDER — AMLODIPINE BESYLATE 10 MG/1
TABLET ORAL
Qty: 90 TABLET | Refills: 3 | Status: SHIPPED | OUTPATIENT
Start: 2024-06-12

## 2024-06-12 RX ORDER — ATORVASTATIN CALCIUM 10 MG/1
10 TABLET, FILM COATED ORAL DAILY
Qty: 90 TABLET | Refills: 3 | Status: SHIPPED | OUTPATIENT
Start: 2024-06-12

## 2024-06-12 RX ORDER — FENOFIBRATE 48 MG/1
48 TABLET, COATED ORAL DAILY
Qty: 90 TABLET | Refills: 3 | Status: SHIPPED | OUTPATIENT
Start: 2024-06-12

## 2024-06-12 NOTE — TELEPHONE ENCOUNTER
Future Appointments    Encounter Information   Provider Department Appt Notes   7/24/2024 Yasmany Vasquez MD [facility] End[facility] 6 month follow up   8/13/2024 Marisol Kendrick APRN - CNP [facility] Primary and Specialty Care 6 mo f/u   8/13/2024 Marisol Kendrick, APRN - CNP [facility] Primary and Specialty Care awv     Past Visits    Date Provider Specialty Visit Type Primary Dx   06/06/2024 Marisol Kendrick, APRN - CNP Family Medicine Telemedicine Medicare annual wellness visit, subsequent

## 2024-07-19 DIAGNOSIS — E03.9 ACQUIRED HYPOTHYROIDISM: ICD-10-CM

## 2024-07-19 DIAGNOSIS — E11.65 UNCONTROLLED TYPE 2 DIABETES MELLITUS WITH HYPERGLYCEMIA (HCC): ICD-10-CM

## 2024-07-19 LAB
ANION GAP SERPL CALCULATED.3IONS-SCNC: 13 MEQ/L (ref 9–15)
BUN SERPL-MCNC: 19 MG/DL (ref 8–23)
CALCIUM SERPL-MCNC: 9 MG/DL (ref 8.5–9.9)
CHLORIDE SERPL-SCNC: 104 MEQ/L (ref 95–107)
CHOLEST SERPL-MCNC: 201 MG/DL (ref 0–199)
CO2 SERPL-SCNC: 23 MEQ/L (ref 20–31)
CREAT SERPL-MCNC: 0.79 MG/DL (ref 0.5–0.9)
ESTIMATED AVERAGE GLUCOSE: 120 MG/DL
GLUCOSE SERPL-MCNC: 106 MG/DL (ref 70–99)
HBA1C MFR BLD: 5.8 % (ref 4–6)
HDLC SERPL-MCNC: 38 MG/DL (ref 40–59)
LDLC SERPL CALC-MCNC: 129 MG/DL (ref 0–129)
POTASSIUM SERPL-SCNC: 4.4 MEQ/L (ref 3.4–4.9)
SODIUM SERPL-SCNC: 140 MEQ/L (ref 135–144)
T4 FREE SERPL-MCNC: 1.71 NG/DL (ref 0.84–1.68)
TRIGL SERPL-MCNC: 169 MG/DL (ref 0–150)
TSH REFLEX: 1.01 UIU/ML (ref 0.44–3.86)

## 2024-07-23 ENCOUNTER — TELEPHONE (OUTPATIENT)
Dept: ENDOCRINOLOGY | Age: 69
End: 2024-07-23

## 2024-07-23 NOTE — TELEPHONE ENCOUNTER
Patient returning call to reschedule her 7/24/2024 appt with Dr Vasquez.  She wanted to know what her blood test results were before she reschedules to determine if she needs to be seen next available or if she can reschedule for 6 months out?  Please call her to advise.

## 2024-07-26 ENCOUNTER — HOSPITAL ENCOUNTER (OUTPATIENT)
Dept: WOMENS IMAGING | Age: 69
End: 2024-07-26
Payer: MEDICARE

## 2024-07-26 VITALS — BODY MASS INDEX: 32.37 KG/M2 | HEIGHT: 62 IN

## 2024-07-26 DIAGNOSIS — Z12.31 ENCOUNTER FOR SCREENING MAMMOGRAM FOR BREAST CANCER: ICD-10-CM

## 2024-07-26 PROCEDURE — 77063 BREAST TOMOSYNTHESIS BI: CPT

## 2024-08-12 DIAGNOSIS — F33.0 MAJOR DEPRESSIVE DISORDER, RECURRENT EPISODE, MILD (HCC): ICD-10-CM

## 2024-08-13 ENCOUNTER — OFFICE VISIT (OUTPATIENT)
Dept: FAMILY MEDICINE CLINIC | Age: 69
End: 2024-08-13
Payer: MEDICARE

## 2024-08-13 VITALS
DIASTOLIC BLOOD PRESSURE: 68 MMHG | BODY MASS INDEX: 32.57 KG/M2 | WEIGHT: 177 LBS | OXYGEN SATURATION: 97 % | HEART RATE: 80 BPM | SYSTOLIC BLOOD PRESSURE: 132 MMHG | TEMPERATURE: 97.7 F | HEIGHT: 62 IN

## 2024-08-13 DIAGNOSIS — F33.1 MODERATE EPISODE OF RECURRENT MAJOR DEPRESSIVE DISORDER (HCC): ICD-10-CM

## 2024-08-13 DIAGNOSIS — E03.9 HYPOTHYROIDISM, UNSPECIFIED TYPE: ICD-10-CM

## 2024-08-13 DIAGNOSIS — E11.65 TYPE 2 DIABETES MELLITUS WITH HYPERGLYCEMIA, WITHOUT LONG-TERM CURRENT USE OF INSULIN (HCC): ICD-10-CM

## 2024-08-13 DIAGNOSIS — Z87.891 PERSONAL HISTORY OF TOBACCO USE: ICD-10-CM

## 2024-08-13 DIAGNOSIS — I10 PRIMARY HYPERTENSION: Primary | ICD-10-CM

## 2024-08-13 DIAGNOSIS — E78.2 HYPERLIPEMIA, MIXED: ICD-10-CM

## 2024-08-13 DIAGNOSIS — H93.13 TINNITUS OF BOTH EARS: ICD-10-CM

## 2024-08-13 PROCEDURE — 1123F ACP DISCUSS/DSCN MKR DOCD: CPT | Performed by: NURSE PRACTITIONER

## 2024-08-13 PROCEDURE — G8427 DOCREV CUR MEDS BY ELIG CLIN: HCPCS | Performed by: NURSE PRACTITIONER

## 2024-08-13 PROCEDURE — 3078F DIAST BP <80 MM HG: CPT | Performed by: NURSE PRACTITIONER

## 2024-08-13 PROCEDURE — 3044F HG A1C LEVEL LT 7.0%: CPT | Performed by: NURSE PRACTITIONER

## 2024-08-13 PROCEDURE — 1090F PRES/ABSN URINE INCON ASSESS: CPT | Performed by: NURSE PRACTITIONER

## 2024-08-13 PROCEDURE — 3017F COLORECTAL CA SCREEN DOC REV: CPT | Performed by: NURSE PRACTITIONER

## 2024-08-13 PROCEDURE — 3075F SYST BP GE 130 - 139MM HG: CPT | Performed by: NURSE PRACTITIONER

## 2024-08-13 PROCEDURE — G0296 VISIT TO DETERM LDCT ELIG: HCPCS | Performed by: NURSE PRACTITIONER

## 2024-08-13 PROCEDURE — 4004F PT TOBACCO SCREEN RCVD TLK: CPT | Performed by: NURSE PRACTITIONER

## 2024-08-13 PROCEDURE — G8417 CALC BMI ABV UP PARAM F/U: HCPCS | Performed by: NURSE PRACTITIONER

## 2024-08-13 PROCEDURE — 2022F DILAT RTA XM EVC RTNOPTHY: CPT | Performed by: NURSE PRACTITIONER

## 2024-08-13 PROCEDURE — 99214 OFFICE O/P EST MOD 30 MIN: CPT | Performed by: NURSE PRACTITIONER

## 2024-08-13 PROCEDURE — G8399 PT W/DXA RESULTS DOCUMENT: HCPCS | Performed by: NURSE PRACTITIONER

## 2024-08-13 RX ORDER — FLUOXETINE HYDROCHLORIDE 20 MG/1
20 CAPSULE ORAL DAILY
Qty: 90 CAPSULE | Refills: 1 | Status: SHIPPED | OUTPATIENT
Start: 2024-08-13

## 2024-08-13 ASSESSMENT — ENCOUNTER SYMPTOMS
RESPIRATORY NEGATIVE: 1
COUGH: 0
SHORTNESS OF BREATH: 0
BLOOD IN STOOL: 0
WHEEZING: 0

## 2024-08-13 NOTE — PROGRESS NOTES
TAKE 1 TABLET BY MOUTH DAILY 1/2/24  Yes Yasmany Vasquez MD   blood glucose monitor strips Test BG once daily, DX: E11.9, NIDDM 12/6/18  Yes Yasmany Vasquez MD   Lancets MISC Test BG once daily, DX: E11.65, NIDDM 12/6/18  Yes Yasmany Vasquez MD   Blood Glucose Monitoring Suppl MIGUEL ANGEL Test BG once daily, DX: E11.65, NIDDM 1/6/17  Yes Yasmany Vasquez MD   Alcohol Swabs PADS Bid 8/1/16  Yes Yasmany Vasquez MD   ibuprofen (ADVIL;MOTRIN) 800 MG tablet Take 1 tablet by mouth every 8 hours as needed for Pain 5/21/24 6/20/24  Marisol Kendrick, APRN - JUDITH                I have reviewed the patient's medical history in detail and updated the computerized patient record.      OBJECTIVE    Vitals:    08/13/24 1045   BP: 132/68   Pulse: 80   Temp: 97.7 °F (36.5 °C)   TempSrc: Temporal   SpO2: 97%   Weight: 80.3 kg (177 lb)   Height: 1.575 m (5' 2\")       Physical Exam  Constitutional:       Appearance: Normal appearance.   HENT:      Head: Normocephalic.   Cardiovascular:      Rate and Rhythm: Normal rate and regular rhythm.      Heart sounds: No murmur heard.  Pulmonary:      Effort: Pulmonary effort is normal. No respiratory distress.      Breath sounds: Normal breath sounds. No wheezing.   Skin:     General: Skin is warm and dry.   Neurological:      General: No focal deficit present.      Mental Status: She is alert and oriented to person, place, and time.               ASSESSMENT/ PLAN    1. Primary hypertension  Stable today in office.     2. Type 2 diabetes mellitus with hyperglycemia, without long-term current use of insulin (HCC)  Labs UTD.     3. Hyperlipemia, mixed  Labs UTD.     4. Moderate episode of recurrent major depressive disorder (HCC)  Controlled on prozac.     5. Hypothyroidism, unspecified type  Stable.     6. Tinnitus of both ears  Normal exam-referral placed.   - Merry Jules MD , Otolaryngology/ENT - [place]    7. Personal history of tobacco use  Due for yearly.   - MA VISIT TO DISCUSS LUNG CA SCREEN W LDCT  - CT

## 2024-08-20 ENCOUNTER — TELEPHONE (OUTPATIENT)
Dept: CARDIOLOGY | Facility: CLINIC | Age: 69
End: 2024-08-20

## 2024-08-20 NOTE — TELEPHONE ENCOUNTER
Patient left voice mail requesting call from office.  No details left.  Returned call to patient who was not available.  Left voice mail instructing patient to call the office.  Kathy Lundberg, [facility]

## 2024-08-23 ENCOUNTER — OFFICE VISIT (OUTPATIENT)
Age: 69
End: 2024-08-23
Payer: MEDICARE

## 2024-08-23 VITALS
BODY MASS INDEX: 32.57 KG/M2 | HEIGHT: 62 IN | WEIGHT: 177 LBS | RESPIRATION RATE: 14 BRPM | HEART RATE: 89 BPM | TEMPERATURE: 97.7 F | SYSTOLIC BLOOD PRESSURE: 135 MMHG | OXYGEN SATURATION: 94 % | DIASTOLIC BLOOD PRESSURE: 70 MMHG

## 2024-08-23 DIAGNOSIS — H93.13 TINNITUS AURIUM, BILATERAL: ICD-10-CM

## 2024-08-23 DIAGNOSIS — Z72.0 TOBACCO USE: ICD-10-CM

## 2024-08-23 DIAGNOSIS — H90.3 BILATERAL SENSORINEURAL HEARING LOSS: Primary | ICD-10-CM

## 2024-08-23 PROCEDURE — G8427 DOCREV CUR MEDS BY ELIG CLIN: HCPCS | Performed by: STUDENT IN AN ORGANIZED HEALTH CARE EDUCATION/TRAINING PROGRAM

## 2024-08-23 PROCEDURE — G8417 CALC BMI ABV UP PARAM F/U: HCPCS | Performed by: STUDENT IN AN ORGANIZED HEALTH CARE EDUCATION/TRAINING PROGRAM

## 2024-08-23 PROCEDURE — 3078F DIAST BP <80 MM HG: CPT | Performed by: STUDENT IN AN ORGANIZED HEALTH CARE EDUCATION/TRAINING PROGRAM

## 2024-08-23 PROCEDURE — 3017F COLORECTAL CA SCREEN DOC REV: CPT | Performed by: STUDENT IN AN ORGANIZED HEALTH CARE EDUCATION/TRAINING PROGRAM

## 2024-08-23 PROCEDURE — 99203 OFFICE O/P NEW LOW 30 MIN: CPT | Performed by: STUDENT IN AN ORGANIZED HEALTH CARE EDUCATION/TRAINING PROGRAM

## 2024-08-23 PROCEDURE — 3075F SYST BP GE 130 - 139MM HG: CPT | Performed by: STUDENT IN AN ORGANIZED HEALTH CARE EDUCATION/TRAINING PROGRAM

## 2024-08-23 PROCEDURE — 1090F PRES/ABSN URINE INCON ASSESS: CPT | Performed by: STUDENT IN AN ORGANIZED HEALTH CARE EDUCATION/TRAINING PROGRAM

## 2024-08-23 PROCEDURE — 1123F ACP DISCUSS/DSCN MKR DOCD: CPT | Performed by: STUDENT IN AN ORGANIZED HEALTH CARE EDUCATION/TRAINING PROGRAM

## 2024-08-23 PROCEDURE — G8399 PT W/DXA RESULTS DOCUMENT: HCPCS | Performed by: STUDENT IN AN ORGANIZED HEALTH CARE EDUCATION/TRAINING PROGRAM

## 2024-08-23 PROCEDURE — 4004F PT TOBACCO SCREEN RCVD TLK: CPT | Performed by: STUDENT IN AN ORGANIZED HEALTH CARE EDUCATION/TRAINING PROGRAM

## 2024-08-23 NOTE — PROGRESS NOTES
E11.65, NIDDM, Disp: 1 Device, Rfl: 0    Alcohol Swabs PADS, Bid, Disp: 100 each, Rfl: 06    ibuprofen (ADVIL;MOTRIN) 800 MG tablet, Take 1 tablet by mouth every 8 hours as needed for Pain, Disp: 90 tablet, Rfl: 0   Past Medical History:   Diagnosis Date    Breast cancer (HCC) 1996    left    Cancer (HCC)     Depression     Hyperlipidemia     Hypertension     Thyroid disease     Type II diabetes mellitus, uncontrolled      Past Surgical History:   Procedure Laterality Date    BREAST BIOPSY Left     malignant    BREAST LUMPECTOMY Left     malignant     SECTION  1981     SECTION  1985    TOTAL KNEE ARTHROPLASTY Right 2021    RIGHT TOTAL KNEE ARTHROPLASTY WITH TIBIAL STEMS SUPINE; CONSTANTIN PERSONA TIBIAL STEM CONSTANTIN-NARCISO performed by Ramez Martines MD at [facility]    TOTAL KNEE ARTHROPLASTY Left 2021    LEFT TOTAL KNEE ARTHROPLASTY. SUPINE;  23 HR BEDDED OBS; CONSTANTIN, [id number] PERSONA. NARCISO performed by Ramez Martines MD at [facility]     Family History   Problem Relation Age of Onset    Heart Disease Mother     Cancer Father     Breast Cancer Neg Hx      Social History     Tobacco Use    Smoking status: Every Day     Current packs/day: 1.00     Average packs/day: 1 pack/day for 40.0 years (40.0 ttl pk-yrs)     Types: Cigarettes    Smokeless tobacco: Never    Tobacco comments:     trying to cut back   Substance Use Topics    Alcohol use: Never     Alcohol/week: 0.0 standard drinks of alcohol       PMH, Surgical Hx, Family Hx, and Social Hx reviewed and updated.    Objective     Vitals:    24 1051   BP: 135/70   Pulse: 89   Resp: 14   Temp: 97.7 °F (36.5 °C)   TempSrc: Temporal   SpO2: 94%   Weight: 80.3 kg (177 lb)   Height: 1.575 m (5' 2\")        Physical Exam  Constitutional:       Appearance: Normal appearance.   HENT:      Head: Normocephalic and atraumatic.      Right Ear: Tympanic membrane, ear canal and external ear normal. No middle ear effusion. Tympanic membrane  is not perforated or retracted.      Left Ear: Tympanic membrane, ear canal and external ear normal.  No middle ear effusion. Tympanic membrane is not perforated or retracted.      Nose: Nose normal.   Eyes:      Extraocular Movements: Extraocular movements intact.   Pulmonary:      Effort: Pulmonary effort is normal.      Breath sounds: No stridor.   Neurological:      General: No focal deficit present.      Mental Status: She is alert and oriented to person, place, and time.   Psychiatric:         Mood and Affect: Mood normal.         Behavior: Behavior normal.           Side effects, adverse effects of the medication prescribed today, as well as treatment plan/ rationale and result expectations have been discussed with the patient who expresses understanding and desires to proceed.  Close follow up to evaluate treatment results and for coordination of care.    I have reviewed the patient's medical history in detail and updated the computerized patient record.      Merry Priest MD

## 2024-08-26 ENCOUNTER — OFFICE VISIT (OUTPATIENT)
Dept: ENDOCRINOLOGY | Age: 69
End: 2024-08-26
Payer: MEDICARE

## 2024-08-26 VITALS
OXYGEN SATURATION: 98 % | SYSTOLIC BLOOD PRESSURE: 123 MMHG | DIASTOLIC BLOOD PRESSURE: 72 MMHG | HEART RATE: 80 BPM | BODY MASS INDEX: 31.68 KG/M2 | WEIGHT: 173.2 LBS

## 2024-08-26 DIAGNOSIS — E11.65 UNCONTROLLED TYPE 2 DIABETES MELLITUS WITH HYPERGLYCEMIA (HCC): Primary | ICD-10-CM

## 2024-08-26 DIAGNOSIS — E03.9 ACQUIRED HYPOTHYROIDISM: ICD-10-CM

## 2024-08-26 DIAGNOSIS — E78.2 MIXED HYPERLIPIDEMIA: ICD-10-CM

## 2024-08-26 LAB
CHP ED QC CHECK: NORMAL
GLUCOSE BLD-MCNC: 132 MG/DL

## 2024-08-26 PROCEDURE — 1123F ACP DISCUSS/DSCN MKR DOCD: CPT | Performed by: INTERNAL MEDICINE

## 2024-08-26 PROCEDURE — 99214 OFFICE O/P EST MOD 30 MIN: CPT | Performed by: INTERNAL MEDICINE

## 2024-08-26 PROCEDURE — 4004F PT TOBACCO SCREEN RCVD TLK: CPT | Performed by: INTERNAL MEDICINE

## 2024-08-26 PROCEDURE — G8417 CALC BMI ABV UP PARAM F/U: HCPCS | Performed by: INTERNAL MEDICINE

## 2024-08-26 PROCEDURE — 1090F PRES/ABSN URINE INCON ASSESS: CPT | Performed by: INTERNAL MEDICINE

## 2024-08-26 PROCEDURE — 82962 GLUCOSE BLOOD TEST: CPT | Performed by: INTERNAL MEDICINE

## 2024-08-26 PROCEDURE — 3074F SYST BP LT 130 MM HG: CPT | Performed by: INTERNAL MEDICINE

## 2024-08-26 PROCEDURE — 3017F COLORECTAL CA SCREEN DOC REV: CPT | Performed by: INTERNAL MEDICINE

## 2024-08-26 PROCEDURE — G8427 DOCREV CUR MEDS BY ELIG CLIN: HCPCS | Performed by: INTERNAL MEDICINE

## 2024-08-26 PROCEDURE — G8399 PT W/DXA RESULTS DOCUMENT: HCPCS | Performed by: INTERNAL MEDICINE

## 2024-08-26 PROCEDURE — 3078F DIAST BP <80 MM HG: CPT | Performed by: INTERNAL MEDICINE

## 2024-08-26 PROCEDURE — 2022F DILAT RTA XM EVC RTNOPTHY: CPT | Performed by: INTERNAL MEDICINE

## 2024-08-26 PROCEDURE — 3044F HG A1C LEVEL LT 7.0%: CPT | Performed by: INTERNAL MEDICINE

## 2024-08-26 NOTE — PROGRESS NOTES
8/26/2024    Assessment:       Diagnosis Orders   1. Uncontrolled type 2 diabetes mellitus with hyperglycemia (HCC)  POCT Glucose    Hemoglobin A1C    Basic Metabolic Panel    Microalbumin / Creatinine Urine Ratio      2. Acquired hypothyroidism  T4, Free    TSH with Reflex    Microalbumin / Creatinine Urine Ratio      3. Mixed hyperlipidemia              PLAN:     Orders Placed This Encounter   Procedures    Hemoglobin A1C     Standing Status:   Future     Standing Expiration Date:   8/26/2025    Basic Metabolic Panel     Standing Status:   Future     Standing Expiration Date:   8/26/2025    T4, Free     Standing Status:   Future     Standing Expiration Date:   8/26/2025    TSH with Reflex     Standing Status:   Future     Standing Expiration Date:   8/26/2025    Microalbumin / Creatinine Urine Ratio     Standing Status:   Future     Standing Expiration Date:   8/26/2025    POCT Glucose     Continue patient on metformin 500 mg twice daily levothyroxine 100 mcg daily patient to restart Lipitor and fenofibrate  LDL goal of less than 90  More than 50% of 30 minutes spent in patient education counseling        Orders Placed This Encounter   Procedures    POCT Glucose     No orders of the defined types were placed in this encounter.    No follow-ups on file.  Subjective:     Chief Complaint   Patient presents with    Diabetes    Hypothyroidism     Vitals:    08/26/24 1353 08/26/24 1356   BP: (!) 140/78 123/72   Site: Right Upper Arm Right Upper Arm   Position: Sitting    Cuff Size: Large Adult    Pulse: 80    SpO2: 98%    Weight: 78.6 kg (173 lb 3.2 oz)      Wt Readings from Last 3 Encounters:   08/26/24 78.6 kg (173 lb 3.2 oz)   08/23/24 80.3 kg (177 lb)   08/13/24 80.3 kg (177 lb)     BP Readings from Last 3 Encounters:   08/26/24 123/72   08/23/24 135/70   08/13/24 132/68     Follow-up on type 2 diabetes A1c has been stable on metformin 500 mg twice daily history of hypothyroidism on Synthroid 100 mcg daily thyroid    Cardiovascular:      Rate and Rhythm: Normal rate.   Pulmonary:      Effort: Pulmonary effort is normal.   Musculoskeletal:         General: Normal range of motion.      Cervical back: Normal range of motion and neck supple.   Neurological:      General: No focal deficit present.      Mental Status: She is alert and oriented to person, place, and time.   Psychiatric:         Mood and Affect: Mood normal.         Behavior: Behavior normal.

## 2024-08-30 ENCOUNTER — HOSPITAL ENCOUNTER (OUTPATIENT)
Dept: CT IMAGING | Age: 69
End: 2024-08-30
Payer: MEDICARE

## 2024-08-30 DIAGNOSIS — Z87.891 PERSONAL HISTORY OF TOBACCO USE: ICD-10-CM

## 2024-08-30 PROCEDURE — 71271 CT THORAX LUNG CANCER SCR C-: CPT

## 2024-11-06 DIAGNOSIS — E11.65 UNCONTROLLED TYPE 2 DIABETES MELLITUS WITH HYPERGLYCEMIA (HCC): ICD-10-CM

## 2024-11-06 DIAGNOSIS — E03.9 ACQUIRED HYPOTHYROIDISM: ICD-10-CM

## 2024-11-07 RX ORDER — LEVOTHYROXINE SODIUM 100 UG/1
TABLET ORAL
Qty: 90 TABLET | Refills: 3 | Status: SHIPPED | OUTPATIENT
Start: 2024-11-07

## 2025-01-15 ENCOUNTER — TELEMEDICINE (OUTPATIENT)
Age: 70
End: 2025-01-15
Payer: MEDICARE

## 2025-01-15 DIAGNOSIS — Z00.00 MEDICARE ANNUAL WELLNESS VISIT, SUBSEQUENT: Primary | ICD-10-CM

## 2025-01-15 PROCEDURE — 1159F MED LIST DOCD IN RCRD: CPT | Performed by: NURSE PRACTITIONER

## 2025-01-15 PROCEDURE — 3017F COLORECTAL CA SCREEN DOC REV: CPT | Performed by: NURSE PRACTITIONER

## 2025-01-15 PROCEDURE — G0439 PPPS, SUBSEQ VISIT: HCPCS | Performed by: NURSE PRACTITIONER

## 2025-01-15 PROCEDURE — 1123F ACP DISCUSS/DSCN MKR DOCD: CPT | Performed by: NURSE PRACTITIONER

## 2025-01-15 SDOH — ECONOMIC STABILITY: FOOD INSECURITY: WITHIN THE PAST 12 MONTHS, THE FOOD YOU BOUGHT JUST DIDN'T LAST AND YOU DIDN'T HAVE MONEY TO GET MORE.: NEVER TRUE

## 2025-01-15 SDOH — ECONOMIC STABILITY: FOOD INSECURITY: WITHIN THE PAST 12 MONTHS, YOU WORRIED THAT YOUR FOOD WOULD RUN OUT BEFORE YOU GOT MONEY TO BUY MORE.: NEVER TRUE

## 2025-01-15 ASSESSMENT — PATIENT HEALTH QUESTIONNAIRE - PHQ9
10. IF YOU CHECKED OFF ANY PROBLEMS, HOW DIFFICULT HAVE THESE PROBLEMS MADE IT FOR YOU TO DO YOUR WORK, TAKE CARE OF THINGS AT HOME, OR GET ALONG WITH OTHER PEOPLE: NOT DIFFICULT AT ALL
1. LITTLE INTEREST OR PLEASURE IN DOING THINGS: NOT AT ALL
SUM OF ALL RESPONSES TO PHQ QUESTIONS 1-9: 2
SUM OF ALL RESPONSES TO PHQ9 QUESTIONS 1 & 2: 0
3. TROUBLE FALLING OR STAYING ASLEEP: SEVERAL DAYS
SUM OF ALL RESPONSES TO PHQ QUESTIONS 1-9: 2
5. POOR APPETITE OR OVEREATING: NOT AT ALL
6. FEELING BAD ABOUT YOURSELF - OR THAT YOU ARE A FAILURE OR HAVE LET YOURSELF OR YOUR FAMILY DOWN: NOT AT ALL
2. FEELING DOWN, DEPRESSED OR HOPELESS: NOT AT ALL
8. MOVING OR SPEAKING SO SLOWLY THAT OTHER PEOPLE COULD HAVE NOTICED. OR THE OPPOSITE, BEING SO FIGETY OR RESTLESS THAT YOU HAVE BEEN MOVING AROUND A LOT MORE THAN USUAL: NOT AT ALL
SUM OF ALL RESPONSES TO PHQ QUESTIONS 1-9: 2
SUM OF ALL RESPONSES TO PHQ QUESTIONS 1-9: 2
7. TROUBLE CONCENTRATING ON THINGS, SUCH AS READING THE NEWSPAPER OR WATCHING TELEVISION: NOT AT ALL
4. FEELING TIRED OR HAVING LITTLE ENERGY: SEVERAL DAYS
9. THOUGHTS THAT YOU WOULD BE BETTER OFF DEAD, OR OF HURTING YOURSELF: NOT AT ALL

## 2025-01-15 NOTE — PROGRESS NOTES
Medicare Annual Wellness Visit    [facility] is here for Medicare AWV    Assessment & Plan   Medicare annual wellness visit, subsequent       Return in 1 year (on 1/15/2026) for Medicare AWV.     Subjective       Patient's complete Health Risk Assessment and screening values have been reviewed and are found in Flowsheets. The following problems were reviewed today and where indicated follow up appointments were made and/or referrals ordered.    Positive Risk Factor Screenings with Interventions:              Inactivity:  On average, how many days per week do you engage in moderate to strenuous exercise (like a brisk walk)?: 0 days (!) Abnormal  On average, how many minutes do you engage in exercise at this level?: 0 min  Interventions:       Abnormal BMI (obese):  There is no height or weight on file to calculate BMI. (!) Abnormal  Interventions:                Tobacco Use:    Tobacco Use      Smoking status: Every Day        Packs/day: 1.00        Years: 1 pack/day for 40.0 years (40.0 ttl pk-yrs)        Types: Cigarettes      Smokeless tobacco: Never      Tobacco comments: trying to cut back     Interventions:                        Objective    Patient-Reported Vitals  No data recorded             No Known Allergies  Prior to Visit Medications    Medication Sig Taking? Authorizing Provider   levothyroxine (SYNTHROID) 100 MCG tablet TAKE 1 TABLET BY MOUTH DAILY  Yasmany Vasquez MD   metFORMIN (GLUCOPHAGE) 500 MG tablet TAKE 1 TABLET BY MOUTH TWICE  DAILY  Yasmany Vasquez MD   FLUoxetine (PROZAC) 20 MG capsule TAKE 1 CAPSULE BY MOUTH DAILY.  Marisol Kendrick APRN - CNP   amLODIPine (NORVASC) 10 MG tablet TAKE 1 TABLET BY MOUTH DAILY  Yasmany Vasquez MD   fenofibrate (TRICOR) 48 MG tablet TAKE 1 TABLET BY MOUTH DAILY  Marisol Kendrick APRN - CNP   atorvastatin (LIPITOR) 10 MG tablet TAKE 1 TABLET BY MOUTH DAILY  Yasmany Vasquez MD   ibuprofen (ADVIL;MOTRIN) 800 MG tablet Take 1 tablet by mouth every 8 hours as needed

## 2025-02-25 ENCOUNTER — OFFICE VISIT (OUTPATIENT)
Age: 70
End: 2025-02-25
Payer: MEDICARE

## 2025-02-25 VITALS
HEIGHT: 62 IN | OXYGEN SATURATION: 97 % | TEMPERATURE: 97.7 F | BODY MASS INDEX: 31.1 KG/M2 | WEIGHT: 169 LBS | SYSTOLIC BLOOD PRESSURE: 124 MMHG | HEART RATE: 97 BPM | DIASTOLIC BLOOD PRESSURE: 60 MMHG

## 2025-02-25 DIAGNOSIS — Z12.31 ENCOUNTER FOR SCREENING MAMMOGRAM FOR MALIGNANT NEOPLASM OF BREAST: ICD-10-CM

## 2025-02-25 DIAGNOSIS — E78.2 HYPERLIPEMIA, MIXED: ICD-10-CM

## 2025-02-25 DIAGNOSIS — F33.1 MODERATE EPISODE OF RECURRENT MAJOR DEPRESSIVE DISORDER (HCC): ICD-10-CM

## 2025-02-25 DIAGNOSIS — E11.65 UNCONTROLLED TYPE 2 DIABETES MELLITUS WITH HYPERGLYCEMIA (HCC): ICD-10-CM

## 2025-02-25 DIAGNOSIS — Z87.891 PERSONAL HISTORY OF TOBACCO USE: ICD-10-CM

## 2025-02-25 DIAGNOSIS — I10 PRIMARY HYPERTENSION: Primary | ICD-10-CM

## 2025-02-25 LAB
CREAT UR-MCNC: 447 MG/DL
HBA1C MFR BLD: 5.9 %
MICROALBUMIN UR-MCNC: 8.9 MG/DL
MICROALBUMIN/CREAT UR-RTO: 19.9 MG/G (ref 0–30)

## 2025-02-25 PROCEDURE — G8399 PT W/DXA RESULTS DOCUMENT: HCPCS | Performed by: NURSE PRACTITIONER

## 2025-02-25 PROCEDURE — G8417 CALC BMI ABV UP PARAM F/U: HCPCS | Performed by: NURSE PRACTITIONER

## 2025-02-25 PROCEDURE — 3074F SYST BP LT 130 MM HG: CPT | Performed by: NURSE PRACTITIONER

## 2025-02-25 PROCEDURE — 1123F ACP DISCUSS/DSCN MKR DOCD: CPT | Performed by: NURSE PRACTITIONER

## 2025-02-25 PROCEDURE — 83036 HEMOGLOBIN GLYCOSYLATED A1C: CPT | Performed by: NURSE PRACTITIONER

## 2025-02-25 PROCEDURE — 2022F DILAT RTA XM EVC RTNOPTHY: CPT | Performed by: NURSE PRACTITIONER

## 2025-02-25 PROCEDURE — 99214 OFFICE O/P EST MOD 30 MIN: CPT | Performed by: NURSE PRACTITIONER

## 2025-02-25 PROCEDURE — G8427 DOCREV CUR MEDS BY ELIG CLIN: HCPCS | Performed by: NURSE PRACTITIONER

## 2025-02-25 PROCEDURE — 4004F PT TOBACCO SCREEN RCVD TLK: CPT | Performed by: NURSE PRACTITIONER

## 2025-02-25 PROCEDURE — G0296 VISIT TO DETERM LDCT ELIG: HCPCS | Performed by: NURSE PRACTITIONER

## 2025-02-25 PROCEDURE — 3044F HG A1C LEVEL LT 7.0%: CPT | Performed by: NURSE PRACTITIONER

## 2025-02-25 PROCEDURE — 1090F PRES/ABSN URINE INCON ASSESS: CPT | Performed by: NURSE PRACTITIONER

## 2025-02-25 PROCEDURE — 3078F DIAST BP <80 MM HG: CPT | Performed by: NURSE PRACTITIONER

## 2025-02-25 PROCEDURE — 3017F COLORECTAL CA SCREEN DOC REV: CPT | Performed by: NURSE PRACTITIONER

## 2025-02-25 PROCEDURE — 1159F MED LIST DOCD IN RCRD: CPT | Performed by: NURSE PRACTITIONER

## 2025-02-25 RX ORDER — IBUPROFEN 800 MG/1
800 TABLET, FILM COATED ORAL EVERY 8 HOURS PRN
Qty: 90 TABLET | Refills: 2 | Status: SHIPPED | OUTPATIENT
Start: 2025-02-25 | End: 2025-03-27

## 2025-02-25 NOTE — PROGRESS NOTES
[facility]  [facility]  [address] [address]  Dept: [phone]  Dept Fax: [phone]  Loc: [phone]     Subjective  Ac Mart, 69 y.o. female Established patient presents today with:  Chief Complaint   Patient presents with    6 Month Follow-Up    Hypertension    Diabetes     Due for A1C       History of Present Illness  The patient presents for a 6-month follow-up visit.    She discontinued her cholesterol medication 2 years ago, despite her physician's disapproval. She has ceased consultations with Dr. Smith, as she believes the blood work conducted by this provider suffices. She reports no shortness of breath or chest pain.    She continues to take Prozac, which has been effective in managing her mood.    She is seeking a refill of her ibuprofen prescription due to persistent arthritic pain. Despite undergoing knee replacement surgery, she continues to experience severe knee pain. Additionally, she suffers from chronic shoulder pain, which limits her arm mobility. A shoulder replacement surgery has been recommended.    She is on Synthroid for her thyroid condition.    MEDICATIONS  Current: Prozac, Synthroid  Discontinued: statin      Past Medical History:   Diagnosis Date    Breast cancer (HCC)     left    Cancer (HCC)     Depression     Hyperlipidemia     Hypertension     Thyroid disease     Type II diabetes mellitus, uncontrolled      Past Surgical History:   Procedure Laterality Date    BREAST BIOPSY Left     malignant    BREAST LUMPECTOMY Left     malignant     SECTION  1981     SECTION  1985    TOTAL KNEE ARTHROPLASTY Right 2021    RIGHT TOTAL KNEE ARTHROPLASTY WITH TIBIAL STEMS SUPINE; CONSTANTIN PERSONA TIBIAL STEM CONSTANTIN-NARCISO performed [place]amez [place]ines [place] [facility] [place]    TOTAL KNEE ARTHROPLASTY Left 2021    LEFT TOTAL KNEE

## 2025-03-12 DIAGNOSIS — F33.0 MAJOR DEPRESSIVE DISORDER, RECURRENT EPISODE, MILD: ICD-10-CM

## 2025-03-13 NOTE — TELEPHONE ENCOUNTER
Please approve or deny request. Thank you!    Rx requested:  Requested Prescriptions     Pending Prescriptions Disp Refills    FLUoxetine (PROZAC) 20 MG capsule [Pharmacy Med Name: FLUoxetine HCl 20 MG Oral Capsule] 90 capsule 3     Sig: TAKE 1 CAPSULE BY MOUTH DAILY         Last Office Visit:   2/25/2025      Next Visit Date:  Future Appointments   Date Time Provider Department Center   7/28/2025 11:20 AM [facility] ROOM 1 [facility]   8/26/2025 11:15 AM Marisol Kendrick, ALY - CNP [facility]   9/2/2025 11:30 AM [facility] ROOM 1 [facility]

## 2025-04-23 DIAGNOSIS — E78.1 HYPERTRIGLYCERIDEMIA: ICD-10-CM

## 2025-04-24 RX ORDER — FENOFIBRATE 48 MG/1
48 TABLET, COATED ORAL DAILY
Qty: 90 TABLET | Refills: 3 | Status: SHIPPED | OUTPATIENT
Start: 2025-04-24

## 2025-04-24 RX ORDER — AMLODIPINE BESYLATE 10 MG/1
10 TABLET ORAL DAILY
Qty: 90 TABLET | Refills: 3 | Status: SHIPPED | OUTPATIENT
Start: 2025-04-24

## 2025-07-28 ENCOUNTER — HOSPITAL ENCOUNTER (OUTPATIENT)
Dept: WOMENS IMAGING | Age: 70
Discharge: HOME | End: 2025-07-30
Payer: MEDICARE

## 2025-07-28 VITALS — BODY MASS INDEX: 30.9 KG/M2 | HEIGHT: 62 IN

## 2025-07-28 DIAGNOSIS — Z12.31 ENCOUNTER FOR SCREENING MAMMOGRAM FOR MALIGNANT NEOPLASM OF BREAST: ICD-10-CM

## 2025-07-28 PROCEDURE — 77063 BREAST TOMOSYNTHESIS BI: CPT

## 2025-08-25 DIAGNOSIS — E78.2 HYPERLIPEMIA, MIXED: ICD-10-CM

## 2025-08-25 DIAGNOSIS — I10 PRIMARY HYPERTENSION: ICD-10-CM

## 2025-08-25 LAB
ALBUMIN SERPL-MCNC: 4.2 G/DL (ref 3.5–4.6)
ALP SERPL-CCNC: 41 U/L (ref 40–130)
ALT SERPL-CCNC: 6 U/L (ref 0–33)
ANION GAP SERPL CALCULATED.3IONS-SCNC: 12 MEQ/L (ref 9–15)
AST SERPL-CCNC: 16 U/L (ref 0–35)
BILIRUB SERPL-MCNC: 0.3 MG/DL (ref 0.2–0.7)
BUN SERPL-MCNC: 14 MG/DL (ref 8–23)
CALCIUM SERPL-MCNC: 9.2 MG/DL (ref 8.5–9.9)
CHLORIDE SERPL-SCNC: 104 MEQ/L (ref 95–107)
CHOLEST SERPL-MCNC: 188 MG/DL (ref 0–199)
CO2 SERPL-SCNC: 22 MEQ/L (ref 20–31)
CREAT SERPL-MCNC: 0.67 MG/DL (ref 0.5–0.9)
GLOBULIN SER CALC-MCNC: 2.2 G/DL (ref 2.3–3.5)
GLUCOSE SERPL-MCNC: 96 MG/DL (ref 70–99)
HDLC SERPL-MCNC: 36 MG/DL (ref 40–59)
LDLC SERPL CALC-MCNC: 118 MG/DL (ref 0–129)
POTASSIUM SERPL-SCNC: 4 MEQ/L (ref 3.4–4.9)
PROT SERPL-MCNC: 6.4 G/DL (ref 6.3–8)
SODIUM SERPL-SCNC: 138 MEQ/L (ref 135–144)
TRIGL SERPL-MCNC: 168 MG/DL (ref 0–150)

## 2025-08-26 ENCOUNTER — TELEPHONE (OUTPATIENT)
Dept: PHARMACY | Facility: CLINIC | Age: 70
End: 2025-08-26

## 2025-08-26 ENCOUNTER — OFFICE VISIT (OUTPATIENT)
Age: 70
End: 2025-08-26
Payer: MEDICARE

## 2025-08-26 VITALS
HEART RATE: 85 BPM | TEMPERATURE: 97.8 F | WEIGHT: 167.6 LBS | BODY MASS INDEX: 30.84 KG/M2 | DIASTOLIC BLOOD PRESSURE: 74 MMHG | HEIGHT: 62 IN | OXYGEN SATURATION: 99 % | SYSTOLIC BLOOD PRESSURE: 138 MMHG

## 2025-08-26 DIAGNOSIS — E78.1 HYPERTRIGLYCERIDEMIA: ICD-10-CM

## 2025-08-26 DIAGNOSIS — E11.65 UNCONTROLLED TYPE 2 DIABETES MELLITUS WITH HYPERGLYCEMIA (HCC): ICD-10-CM

## 2025-08-26 DIAGNOSIS — E03.9 ACQUIRED HYPOTHYROIDISM: ICD-10-CM

## 2025-08-26 LAB — HBA1C MFR BLD: 5.8 %

## 2025-08-26 PROCEDURE — 99214 OFFICE O/P EST MOD 30 MIN: CPT | Performed by: NURSE PRACTITIONER

## 2025-08-26 PROCEDURE — 1123F ACP DISCUSS/DSCN MKR DOCD: CPT | Performed by: NURSE PRACTITIONER

## 2025-08-26 PROCEDURE — G8417 CALC BMI ABV UP PARAM F/U: HCPCS | Performed by: NURSE PRACTITIONER

## 2025-08-26 PROCEDURE — 83036 HEMOGLOBIN GLYCOSYLATED A1C: CPT | Performed by: NURSE PRACTITIONER

## 2025-08-26 PROCEDURE — 3078F DIAST BP <80 MM HG: CPT | Performed by: NURSE PRACTITIONER

## 2025-08-26 PROCEDURE — 3075F SYST BP GE 130 - 139MM HG: CPT | Performed by: NURSE PRACTITIONER

## 2025-08-26 PROCEDURE — 3017F COLORECTAL CA SCREEN DOC REV: CPT | Performed by: NURSE PRACTITIONER

## 2025-08-26 PROCEDURE — 1090F PRES/ABSN URINE INCON ASSESS: CPT | Performed by: NURSE PRACTITIONER

## 2025-08-26 PROCEDURE — 4004F PT TOBACCO SCREEN RCVD TLK: CPT | Performed by: NURSE PRACTITIONER

## 2025-08-26 PROCEDURE — G8399 PT W/DXA RESULTS DOCUMENT: HCPCS | Performed by: NURSE PRACTITIONER

## 2025-08-26 PROCEDURE — 2022F DILAT RTA XM EVC RTNOPTHY: CPT | Performed by: NURSE PRACTITIONER

## 2025-08-26 PROCEDURE — 1159F MED LIST DOCD IN RCRD: CPT | Performed by: NURSE PRACTITIONER

## 2025-08-26 PROCEDURE — G8427 DOCREV CUR MEDS BY ELIG CLIN: HCPCS | Performed by: NURSE PRACTITIONER

## 2025-08-26 PROCEDURE — 3044F HG A1C LEVEL LT 7.0%: CPT | Performed by: NURSE PRACTITIONER

## 2025-08-26 RX ORDER — LEVOTHYROXINE SODIUM 100 UG/1
TABLET ORAL
Qty: 90 TABLET | Refills: 3 | Status: SHIPPED | OUTPATIENT
Start: 2025-08-26

## 2025-08-26 RX ORDER — FENOFIBRATE 48 MG/1
48 TABLET, FILM COATED ORAL DAILY
Qty: 90 TABLET | Refills: 3
Start: 2025-08-26

## (undated) DEVICE — SUTURE VCRL SZ 2-0 L36IN ABSRB UD L36MM CT-1 1/2 CIR J945H

## (undated) DEVICE — GLOVE ORANGE PI 8   MSG9080

## (undated) DEVICE — GLOVE ORTHO 7 1/2   MSG9475

## (undated) DEVICE — 4-PORT MANIFOLD: Brand: NEPTUNE 2

## (undated) DEVICE — STERILE PATIENT PROTECTIVE PAD FOR IMP® KNEE POSITIONERS & COHESIVE WRAP (10 / CASE): Brand: DE MAYO KNEE POSITIONER®

## (undated) DEVICE — BLADE RMR L32MM PAT W/ PILOT H

## (undated) DEVICE — ELECTRODE PT RET AD L9FT HI MOIST COND ADH HYDRGEL CORDED

## (undated) DEVICE — SPLINT KNEE UNIV FOR LESS THAN 36IN L20IN FOAM LAM E CNTCT

## (undated) DEVICE — COVER LT HNDL BLU PLAS

## (undated) DEVICE — Z DISCONTINUED PER MEDLINE USE 2741944 DRESSING AQUACEL 12 IN SURG W9XL30CM SIL CVR WTRPRF VIR BACT BARR ANTIMIC

## (undated) DEVICE — BLADE SAW W125XL70MM THK064MM CUT THK094MM REPL RECIP DBL

## (undated) DEVICE — ABSORBENT ROLL ECODRI-SAFE

## (undated) DEVICE — Device: Brand: STABLECUT®

## (undated) DEVICE — SUTURE VCRL SZ 1 L36IN ABSRB UD L36MM CT-1 1/2 CIR J947H

## (undated) DEVICE — HOOD: Brand: FLYTE, SURGICOOL

## (undated) DEVICE — SIPS DUAL 2 MINUTE TIP

## (undated) DEVICE — APPLICATOR MEDICATED 26 CC SOLUTION HI LT ORNG CHLORAPREP

## (undated) DEVICE — 450 ML BOTTLE OF 0.05% CHLORHEXIDINE GLUCONATE IN 99.95% STERILE WATER FOR IRRIGATION, USP AND APPLICATOR.: Brand: IRRISEPT ANTIMICROBIAL WOUND LAVAGE

## (undated) DEVICE — PADDING CAST W6INXL4YD RAYON UNDERCAST SOF-ROL

## (undated) DEVICE — SUTURE MCRYL SZ 4-0 L27IN ABSRB UD L19MM PS-2 1/2 CIR PRIM Y426H

## (undated) DEVICE — ADHESIVE SKIN CLSR 0.7ML TOP DERMBND ADV

## (undated) DEVICE — LABEL MED MINI W/ MARKER

## (undated) DEVICE — 3M™ STERI-DRAPE™ U-DRAPE 1015: Brand: STERI-DRAPE™

## (undated) DEVICE — HYPODERMIC SAFETY NEEDLE: Brand: MAGELLAN

## (undated) DEVICE — HOOD: Brand: T7PLUS

## (undated) DEVICE — PACK PROCEDURE SURG TOTAL KNEE PACK

## (undated) DEVICE — SYRINGE MED 30ML STD CLR PLAS LUERLOCK TIP N CTRL DISP

## (undated) DEVICE — 3 BONE CEMENT MIXER WITH SPATULA: Brand: MIXEVAC, ACM

## (undated) DEVICE — BANDAGE COMPR M W6INXL10YD WHT BGE VELC E MTRX HK AND LOOP